# Patient Record
Sex: FEMALE | Race: ASIAN | Employment: OTHER | URBAN - METROPOLITAN AREA
[De-identification: names, ages, dates, MRNs, and addresses within clinical notes are randomized per-mention and may not be internally consistent; named-entity substitution may affect disease eponyms.]

---

## 2017-01-14 ENCOUNTER — APPOINTMENT (OUTPATIENT)
Dept: LAB | Age: 62
End: 2017-01-14
Attending: INTERNAL MEDICINE
Payer: COMMERCIAL

## 2017-01-14 DIAGNOSIS — E11.9 TYPE 2 DIABETES MELLITUS WITHOUT COMPLICATION, WITHOUT LONG-TERM CURRENT USE OF INSULIN (HCC): ICD-10-CM

## 2017-01-14 LAB
ALBUMIN SERPL BCP-MCNC: 3.7 G/DL (ref 3.5–4.8)
ALBUMIN/GLOB SERPL: 1.2 {RATIO} (ref 1–2)
ALP SERPL-CCNC: 54 U/L (ref 32–100)
ALT SERPL-CCNC: 18 U/L (ref 14–54)
ANION GAP SERPL CALC-SCNC: 10 MMOL/L (ref 0–18)
AST SERPL-CCNC: 16 U/L (ref 15–41)
BILIRUB SERPL-MCNC: 0.9 MG/DL (ref 0.3–1.2)
BUN SERPL-MCNC: 8 MG/DL (ref 8–20)
BUN/CREAT SERPL: 12.9 (ref 10–20)
CALCIUM SERPL-MCNC: 8.5 MG/DL (ref 8.5–10.5)
CHLORIDE SERPL-SCNC: 102 MMOL/L (ref 95–110)
CHOLEST SERPL-MCNC: 173 MG/DL (ref 110–200)
CO2 SERPL-SCNC: 26 MMOL/L (ref 22–32)
CREAT SERPL-MCNC: 0.62 MG/DL (ref 0.5–1.5)
CREAT UR-MCNC: 138.1 MG/DL
GLOBULIN PLAS-MCNC: 3.1 G/DL (ref 2.5–3.7)
GLUCOSE SERPL-MCNC: 168 MG/DL (ref 70–99)
HDLC SERPL-MCNC: 71 MG/DL
LDLC SERPL CALC-MCNC: 89 MG/DL (ref 0–99)
MICROALBUMIN UR-MCNC: 9.8 MG/DL (ref 0–1.8)
MICROALBUMIN/CREAT UR: 71 MG/G{CREAT} (ref 0–20)
NONHDLC SERPL-MCNC: 102 MG/DL
OSMOLALITY UR CALC.SUM OF ELEC: 288 MOSM/KG (ref 275–295)
POTASSIUM SERPL-SCNC: 3.6 MMOL/L (ref 3.3–5.1)
PROT SERPL-MCNC: 6.8 G/DL (ref 5.9–8.4)
SODIUM SERPL-SCNC: 138 MMOL/L (ref 136–144)
TRIGL SERPL-MCNC: 67 MG/DL (ref 1–149)
TSH SERPL-ACNC: 1.01 UIU/ML (ref 0.34–5.6)

## 2017-01-14 PROCEDURE — 80053 COMPREHEN METABOLIC PANEL: CPT

## 2017-01-14 PROCEDURE — 82043 UR ALBUMIN QUANTITATIVE: CPT

## 2017-01-14 PROCEDURE — 80061 LIPID PANEL: CPT

## 2017-01-14 PROCEDURE — 84443 ASSAY THYROID STIM HORMONE: CPT

## 2017-01-14 PROCEDURE — 36415 COLL VENOUS BLD VENIPUNCTURE: CPT

## 2017-01-14 PROCEDURE — 82570 ASSAY OF URINE CREATININE: CPT

## 2017-01-14 PROCEDURE — 83036 HEMOGLOBIN GLYCOSYLATED A1C: CPT

## 2017-01-15 LAB — HBA1C MFR BLD: 8.4 % (ref 4–6)

## 2017-01-26 RX ORDER — GLIMEPIRIDE 1 MG/1
TABLET ORAL
Qty: 180 TABLET | Refills: 0 | Status: SHIPPED | OUTPATIENT
Start: 2017-01-26 | End: 2017-02-07

## 2017-01-26 RX ORDER — ROSUVASTATIN CALCIUM 10 MG/1
TABLET, COATED ORAL
Qty: 90 TABLET | Refills: 0 | Status: SHIPPED | OUTPATIENT
Start: 2017-01-26 | End: 2018-01-15

## 2017-01-27 ENCOUNTER — TELEPHONE (OUTPATIENT)
Dept: INTERNAL MEDICINE CLINIC | Facility: CLINIC | Age: 62
End: 2017-01-27

## 2017-01-27 NOTE — TELEPHONE ENCOUNTER
Relayed doctor message to patient. Verbalized understanding. Agreed with plan.  Already has appt scheduled for 2/7

## 2017-01-27 NOTE — TELEPHONE ENCOUNTER
----- Message from Larry Fleming MD sent at 1/16/2017 12:00 AM CST -----  Diabetes is uncontrolled.   Please see me to discuss findings    Send letter and labs  Make an appt within the next month

## 2017-02-07 ENCOUNTER — OFFICE VISIT (OUTPATIENT)
Dept: INTERNAL MEDICINE CLINIC | Facility: CLINIC | Age: 62
End: 2017-02-07

## 2017-02-07 VITALS
SYSTOLIC BLOOD PRESSURE: 115 MMHG | WEIGHT: 147 LBS | BODY MASS INDEX: 30.03 KG/M2 | HEART RATE: 86 BPM | DIASTOLIC BLOOD PRESSURE: 75 MMHG | HEIGHT: 58.5 IN

## 2017-02-07 DIAGNOSIS — E11.69: Primary | ICD-10-CM

## 2017-02-07 DIAGNOSIS — E78.5 HYPERLIPIDEMIA, UNSPECIFIED HYPERLIPIDEMIA TYPE: ICD-10-CM

## 2017-02-07 DIAGNOSIS — I10 ESSENTIAL HYPERTENSION WITH GOAL BLOOD PRESSURE LESS THAN 130/80: ICD-10-CM

## 2017-02-07 DIAGNOSIS — Z78.0 POSTMENOPAUSAL: ICD-10-CM

## 2017-02-07 DIAGNOSIS — Z12.31 VISIT FOR SCREENING MAMMOGRAM: ICD-10-CM

## 2017-02-07 DIAGNOSIS — R80.9 PROTEINURIA, UNSPECIFIED TYPE: ICD-10-CM

## 2017-02-07 PROCEDURE — 99213 OFFICE O/P EST LOW 20 MIN: CPT | Performed by: INTERNAL MEDICINE

## 2017-02-07 PROCEDURE — 99214 OFFICE O/P EST MOD 30 MIN: CPT | Performed by: INTERNAL MEDICINE

## 2017-02-15 RX ORDER — GLIMEPIRIDE 1 MG/1
TABLET ORAL
Qty: 180 TABLET | Refills: 3 | Status: SHIPPED | OUTPATIENT
Start: 2017-02-15 | End: 2018-01-15

## 2017-02-19 RX ORDER — AMLODIPINE BESYLATE 10 MG/1
TABLET ORAL
Qty: 90 TABLET | Refills: 3 | Status: SHIPPED | OUTPATIENT
Start: 2017-02-19 | End: 2018-01-15

## 2017-03-22 RX ORDER — CLOTRIMAZOLE AND BETAMETHASONE DIPROPIONATE 10; .64 MG/G; MG/G
CREAM TOPICAL
Qty: 15 G | Refills: 0 | Status: SHIPPED | OUTPATIENT
Start: 2017-03-22 | End: 2018-01-15

## 2017-03-23 NOTE — TELEPHONE ENCOUNTER
Unable to leave message on voice mail. Letter mailed to pt informing pt of refill, needs a f/u appt for any further refills and asked to call the dermatology office to update her contact information.

## 2017-04-20 ENCOUNTER — PATIENT OUTREACH (OUTPATIENT)
Dept: INTERNAL MEDICINE CLINIC | Facility: CLINIC | Age: 62
End: 2017-04-20

## 2017-04-26 NOTE — PROGRESS NOTES
Spoke with pt. Discussed that she is due for repeat Hb A1c. Pt states \"I just had labs done! \". Results and date reviewed with her. She agrees to have them completed.   Instructed pt that she will need to fast for blood work as Lipid was also ordered at

## 2017-05-01 RX ORDER — FLUTICASONE PROPIONATE 50 MCG
SPRAY, SUSPENSION (ML) NASAL
Qty: 1 BOTTLE | Refills: 3 | OUTPATIENT
Start: 2017-05-01

## 2017-05-13 RX ORDER — MONTELUKAST SODIUM 10 MG/1
TABLET ORAL
Qty: 30 TABLET | Refills: 5 | Status: SHIPPED | OUTPATIENT
Start: 2017-05-13 | End: 2018-01-13

## 2017-05-22 RX ORDER — ROSUVASTATIN CALCIUM 10 MG/1
TABLET, COATED ORAL
Qty: 90 TABLET | Refills: 3 | Status: SHIPPED | OUTPATIENT
Start: 2017-05-22 | End: 2018-10-13

## 2017-06-03 ENCOUNTER — APPOINTMENT (OUTPATIENT)
Dept: LAB | Age: 62
End: 2017-06-03
Attending: INTERNAL MEDICINE
Payer: COMMERCIAL

## 2017-06-03 DIAGNOSIS — E11.69: ICD-10-CM

## 2017-06-03 PROCEDURE — 84450 TRANSFERASE (AST) (SGOT): CPT

## 2017-06-03 PROCEDURE — 80061 LIPID PANEL: CPT

## 2017-06-03 PROCEDURE — 83036 HEMOGLOBIN GLYCOSYLATED A1C: CPT

## 2017-06-03 PROCEDURE — 84460 ALANINE AMINO (ALT) (SGPT): CPT

## 2017-06-03 PROCEDURE — 36415 COLL VENOUS BLD VENIPUNCTURE: CPT

## 2017-06-03 PROCEDURE — 80048 BASIC METABOLIC PNL TOTAL CA: CPT

## 2017-06-07 ENCOUNTER — TELEPHONE (OUTPATIENT)
Dept: INTERNAL MEDICINE CLINIC | Facility: CLINIC | Age: 62
End: 2017-06-07

## 2017-06-07 NOTE — TELEPHONE ENCOUNTER
Pt is requesting an apt for next week in [place].   Pt states she was told if she needed an apt soon, contact MD. Please advise

## 2017-06-14 ENCOUNTER — OFFICE VISIT (OUTPATIENT)
Dept: INTERNAL MEDICINE CLINIC | Facility: CLINIC | Age: 62
End: 2017-06-14

## 2017-06-14 VITALS
WEIGHT: 147 LBS | SYSTOLIC BLOOD PRESSURE: 131 MMHG | TEMPERATURE: 99 F | HEIGHT: 58.5 IN | HEART RATE: 82 BPM | DIASTOLIC BLOOD PRESSURE: 77 MMHG | BODY MASS INDEX: 30.03 KG/M2 | RESPIRATION RATE: 16 BRPM

## 2017-06-14 DIAGNOSIS — E11.29 DIABETES MELLITUS WITH PROTEINURIA (HCC): ICD-10-CM

## 2017-06-14 DIAGNOSIS — I10 ESSENTIAL HYPERTENSION WITH GOAL BLOOD PRESSURE LESS THAN 130/80: ICD-10-CM

## 2017-06-14 DIAGNOSIS — R80.9 DIABETES MELLITUS WITH PROTEINURIA (HCC): ICD-10-CM

## 2017-06-14 DIAGNOSIS — E11.69 TYPE 2 DIABETES MELLITUS WITH OTHER SPECIFIED COMPLICATION, WITHOUT LONG-TERM CURRENT USE OF INSULIN (HCC): Primary | ICD-10-CM

## 2017-06-14 DIAGNOSIS — E78.5 HYPERLIPIDEMIA, UNSPECIFIED HYPERLIPIDEMIA TYPE: ICD-10-CM

## 2017-06-14 PROCEDURE — 99214 OFFICE O/P EST MOD 30 MIN: CPT | Performed by: INTERNAL MEDICINE

## 2017-06-14 PROCEDURE — 99212 OFFICE O/P EST SF 10 MIN: CPT | Performed by: INTERNAL MEDICINE

## 2017-06-14 RX ORDER — FLUTICASONE PROPIONATE 50 MCG
SPRAY, SUSPENSION (ML) NASAL
Qty: 1 BOTTLE | Refills: 11 | Status: SHIPPED | OUTPATIENT
Start: 2017-06-14 | End: 2018-07-11

## 2017-06-17 NOTE — PROGRESS NOTES
Quick Note:    Per EMR pt lov 6-14-17 for f/u on labs. Any further action needed or Ok to file?    pls advise, thanks in advance.     ______

## 2017-06-24 PROBLEM — R80.9 DIABETES MELLITUS WITH PROTEINURIA: Status: ACTIVE | Noted: 2017-06-24

## 2017-06-24 PROBLEM — E78.5 HYPERLIPIDEMIA: Status: ACTIVE | Noted: 2017-06-24

## 2017-06-24 PROBLEM — E11.9 TYPE 2 DIABETES MELLITUS, WITHOUT LONG-TERM CURRENT USE OF INSULIN (HCC): Status: ACTIVE | Noted: 2017-06-24

## 2017-06-24 PROBLEM — I10 ESSENTIAL HYPERTENSION WITH GOAL BLOOD PRESSURE LESS THAN 130/80: Status: ACTIVE | Noted: 2017-06-24

## 2017-06-24 PROBLEM — R80.9 DIABETES MELLITUS WITH PROTEINURIA (HCC): Status: ACTIVE | Noted: 2017-06-24

## 2017-06-24 PROBLEM — R80.9 DIABETES MELLITUS WITH PROTEINURIA  (HCC): Status: ACTIVE | Noted: 2017-06-24

## 2017-06-24 PROBLEM — E11.29 DIABETES MELLITUS WITH PROTEINURIA: Status: ACTIVE | Noted: 2017-06-24

## 2017-06-24 PROBLEM — E11.29 DIABETES MELLITUS WITH PROTEINURIA  (HCC): Status: ACTIVE | Noted: 2017-06-24

## 2017-06-24 PROBLEM — E11.29 DIABETES MELLITUS WITH PROTEINURIA (HCC): Status: ACTIVE | Noted: 2017-06-24

## 2017-06-25 NOTE — PROGRESS NOTES
HPI:    Patient ID: Tatyana Del Cid is a 57year old female.     HPI    HTN  Long standing history of hypertension  More than a year shaun  Status::::: controlled:::::::::::::::::::::::::::::y   sympotms  :        Headache no  dizziness        no problems.            Current Outpatient Prescriptions:  Fluticasone Propionate 50 MCG/ACT Nasal Suspension USE 1 SPRAY IN EACH NOSTRIL TWICE DAILY Disp: 1 Bottle Rfl: 11   MetFORMIN HCl 500 MG Oral Tab TAKE 2 TABLETS BY MOUTH TWICE DAILY Disp: 360 tablet Rf Mother      Cause of Death   • Pulmonary Disease Father 68     COPD Cause of Death   • Heart Disorder Brother 46     Congestive Heart Failure      Social History: Smoking status: Never Smoker                                                              Alc - 18 mmol/L 9   CALCULATED OSMOLALITY      275 - 295 mOsm/kg 293   GFR, Non-      >=60 >60   GFR, -American      >=60 >60   HDL Cholesterol      mg/dL 73   CHOLESTEROL, TOTAL      110 - 200 mg/dL 180   Triglycerides      1 - 149 mg/d prevention of renal failure adivsed    (I10) Essential hypertension with goal blood pressure less than 130/80  Plan: /77   Pulse 82   Temp 98.8 °F (37.1 °C) (Oral)   Resp 16   Ht 4' 10.5\" (1.486 m)   Wt 147 lb (66.7 kg)   BMI 30.20 kg/m²   Controlle

## 2017-08-18 ENCOUNTER — HOSPITAL ENCOUNTER (OUTPATIENT)
Dept: MAMMOGRAPHY | Age: 62
Discharge: HOME | End: 2017-08-18
Attending: INTERNAL MEDICINE
Payer: COMMERCIAL

## 2017-08-18 ENCOUNTER — HOSPITAL ENCOUNTER (OUTPATIENT)
Dept: BONE DENSITY | Age: 62
Discharge: HOME | End: 2017-08-18
Attending: INTERNAL MEDICINE
Payer: COMMERCIAL

## 2017-08-18 DIAGNOSIS — Z12.31 VISIT FOR SCREENING MAMMOGRAM: ICD-10-CM

## 2017-08-18 DIAGNOSIS — Z78.0 POSTMENOPAUSAL: ICD-10-CM

## 2017-08-18 PROCEDURE — 77080 DXA BONE DENSITY AXIAL: CPT | Performed by: INTERNAL MEDICINE

## 2017-08-18 PROCEDURE — 77067 SCR MAMMO BI INCL CAD: CPT | Performed by: INTERNAL MEDICINE

## 2017-08-29 RX ORDER — AMLODIPINE BESYLATE 10 MG/1
TABLET ORAL
Qty: 90 TABLET | Refills: 3 | Status: SHIPPED | OUTPATIENT
Start: 2017-08-29 | End: 2019-04-10

## 2017-09-06 RX ORDER — MONTELUKAST SODIUM 10 MG/1
TABLET ORAL
Qty: 30 TABLET | Refills: 0 | OUTPATIENT
Start: 2017-09-06

## 2018-01-13 RX ORDER — MONTELUKAST SODIUM 10 MG/1
TABLET ORAL
Qty: 30 TABLET | Refills: 0 | Status: SHIPPED | OUTPATIENT
Start: 2018-01-13 | End: 2018-05-23

## 2018-01-13 NOTE — TELEPHONE ENCOUNTER
Refill Protocol Appointment Criteria  · Appointment scheduled in the past 6 months or in the next 3 months  Recent Outpatient Visits            7 months ago Type 2 diabetes mellitus with other specified complication, without long-term current use of insuli

## 2018-01-15 ENCOUNTER — OFFICE VISIT (OUTPATIENT)
Dept: INTERNAL MEDICINE CLINIC | Facility: CLINIC | Age: 63
End: 2018-01-15

## 2018-01-15 VITALS
TEMPERATURE: 98 F | HEIGHT: 58.5 IN | DIASTOLIC BLOOD PRESSURE: 67 MMHG | HEART RATE: 76 BPM | WEIGHT: 140.63 LBS | SYSTOLIC BLOOD PRESSURE: 113 MMHG | BODY MASS INDEX: 28.73 KG/M2

## 2018-01-15 DIAGNOSIS — I10 ESSENTIAL HYPERTENSION WITH GOAL BLOOD PRESSURE LESS THAN 130/80: ICD-10-CM

## 2018-01-15 DIAGNOSIS — E11.69 TYPE 2 DIABETES MELLITUS WITH OTHER SPECIFIED COMPLICATION, WITHOUT LONG-TERM CURRENT USE OF INSULIN (HCC): Primary | ICD-10-CM

## 2018-01-15 DIAGNOSIS — E78.5 HYPERLIPIDEMIA, UNSPECIFIED HYPERLIPIDEMIA TYPE: ICD-10-CM

## 2018-01-15 PROCEDURE — 99214 OFFICE O/P EST MOD 30 MIN: CPT | Performed by: INTERNAL MEDICINE

## 2018-01-15 PROCEDURE — 90471 IMMUNIZATION ADMIN: CPT | Performed by: INTERNAL MEDICINE

## 2018-01-15 PROCEDURE — 99212 OFFICE O/P EST SF 10 MIN: CPT | Performed by: INTERNAL MEDICINE

## 2018-01-15 PROCEDURE — 90686 IIV4 VACC NO PRSV 0.5 ML IM: CPT | Performed by: INTERNAL MEDICINE

## 2018-01-15 RX ORDER — GLIMEPIRIDE 1 MG/1
1 TABLET ORAL 2 TIMES DAILY
Qty: 180 TABLET | Refills: 3 | Status: SHIPPED | OUTPATIENT
Start: 2018-01-15 | End: 2018-10-15

## 2018-01-23 NOTE — PROGRESS NOTES
HPI:    Patient ID: Mandy Barrientos is a 60year old female. HPI     Diabetes   Follow upvisit. type 2 diabetes mellitus.     disease course has been    Controlled   There are no hypoglycemic associated symptoms   blurred vision,   no  no chest pain, Neurological: Negative for syncope, weakness, light-headedness and headaches. Hematological: Negative for adenopathy. Does not bruise/bleed easily. Psychiatric/Behavioral: Negative for agitation and behavioral problems.            Current Outpatient P glasses yearly       PHYSICAL EXAM:    Physical Exam   Constitutional: She appears well-nourished. No distress. HENT:   Head: Normocephalic and atraumatic.    Right Ear: External ear normal.   Left Ear: External ear normal.   Nose: Nose normal.   Mouth/Th side effect reviewed. Most recent laboratory and diagnostic testing reviewed. Dietary and lifestyle change discussed. Modification of risk for CAD discussed. Patient voiced understanding and agrees with current plan and management.           Orders Plac

## 2018-02-20 RX ORDER — AMLODIPINE BESYLATE 10 MG/1
TABLET ORAL
Qty: 90 TABLET | Refills: 0 | Status: SHIPPED | OUTPATIENT
Start: 2018-02-20 | End: 2018-09-06

## 2018-02-20 RX ORDER — MONTELUKAST SODIUM 10 MG/1
TABLET ORAL
Qty: 90 TABLET | Refills: 0 | Status: SHIPPED | OUTPATIENT
Start: 2018-02-20 | End: 2018-05-23

## 2018-03-19 RX ORDER — CLOTRIMAZOLE AND BETAMETHASONE DIPROPIONATE 10; .64 MG/G; MG/G
CREAM TOPICAL
Qty: 15 G | Refills: 0 | OUTPATIENT
Start: 2018-03-19

## 2018-03-19 NOTE — TELEPHONE ENCOUNTER
LOV 12/30/15 pt requesting refill for CLOTRIMAZOLE-BETAMETHASONE 1-0.05 % External Cream please advise thank you

## 2018-05-23 RX ORDER — MONTELUKAST SODIUM 10 MG/1
TABLET ORAL
Qty: 90 TABLET | Refills: 0 | Status: SHIPPED | OUTPATIENT
Start: 2018-05-23 | End: 2018-10-02

## 2018-05-25 RX ORDER — CLOTRIMAZOLE AND BETAMETHASONE DIPROPIONATE 10; .64 MG/G; MG/G
1 CREAM TOPICAL 2 TIMES DAILY
Qty: 15 G | Refills: 3 | Status: SHIPPED | OUTPATIENT
Start: 2018-05-25 | End: 2020-03-16

## 2018-06-23 ENCOUNTER — APPOINTMENT (OUTPATIENT)
Dept: LAB | Age: 63
End: 2018-06-23
Attending: INTERNAL MEDICINE
Payer: COMMERCIAL

## 2018-07-11 ENCOUNTER — OFFICE VISIT (OUTPATIENT)
Dept: INTERNAL MEDICINE CLINIC | Facility: CLINIC | Age: 63
End: 2018-07-11

## 2018-07-11 VITALS
DIASTOLIC BLOOD PRESSURE: 74 MMHG | SYSTOLIC BLOOD PRESSURE: 134 MMHG | BODY MASS INDEX: 30.24 KG/M2 | HEIGHT: 58.5 IN | HEART RATE: 94 BPM | TEMPERATURE: 98 F | WEIGHT: 148 LBS

## 2018-07-11 DIAGNOSIS — E78.5 HYPERLIPIDEMIA, UNSPECIFIED HYPERLIPIDEMIA TYPE: ICD-10-CM

## 2018-07-11 DIAGNOSIS — R80.9 DIABETES MELLITUS WITH PROTEINURIA (HCC): ICD-10-CM

## 2018-07-11 DIAGNOSIS — I10 ESSENTIAL HYPERTENSION WITH GOAL BLOOD PRESSURE LESS THAN 130/80: ICD-10-CM

## 2018-07-11 DIAGNOSIS — E11.29 DIABETES MELLITUS WITH PROTEINURIA (HCC): ICD-10-CM

## 2018-07-11 DIAGNOSIS — Z12.31 VISIT FOR SCREENING MAMMOGRAM: ICD-10-CM

## 2018-07-11 DIAGNOSIS — E11.69 TYPE 2 DIABETES MELLITUS WITH OTHER SPECIFIED COMPLICATION, WITHOUT LONG-TERM CURRENT USE OF INSULIN (HCC): Primary | ICD-10-CM

## 2018-07-11 PROCEDURE — 99214 OFFICE O/P EST MOD 30 MIN: CPT | Performed by: INTERNAL MEDICINE

## 2018-07-11 PROCEDURE — 99212 OFFICE O/P EST SF 10 MIN: CPT | Performed by: INTERNAL MEDICINE

## 2018-07-23 NOTE — PROGRESS NOTES
HPI:    Patient ID: Yadi Eason is a 60year old female.     HPI    HTN  Long standing history of hypertension     sympotms  :        Headache no  dizziness        no                             Blurred vision no  palpitaionsSyncope no  Chest pain  no Current Outpatient Prescriptions:  NON FORMULARY  Disp:  Rfl:    clotrimazole-betamethasone 1-0.05 % External Cream Apply 1 Tube topically 2 (two) times daily.  Disp: 15 g Rfl: 3   Montelukast Sodium 10 MG Oral Tab TAKE 1 TABLET BY MOUTH EVERY CRISTINA distress. HENT:   Head: Normocephalic and atraumatic. Right Ear: External ear normal.   Left Ear: External ear normal.   Nose: Nose normal.   Mouth/Throat: Oropharynx is clear and moist. No oropharyngeal exudate.    Eyes: Conjunctivae and EOM are normal 10.5\" (1.486 m)   Wt 148 lb (67.1 kg)   BMI 30.41 kg/m²   controlle dCPM    (E78.5) Hyperlipidemia, unspecified hyperlipidemia type  Plan: FREE T4 (FREE THYROXINE), ASSAY, THYROID STIM         HORMONE, LIPID PANEL        Low chol diet    (Z12.31) Visit fo

## 2018-08-15 ENCOUNTER — OFFICE VISIT (OUTPATIENT)
Dept: PODIATRY CLINIC | Facility: CLINIC | Age: 63
End: 2018-08-15
Payer: COMMERCIAL

## 2018-08-15 DIAGNOSIS — Q82.8 POROKERATOSIS: ICD-10-CM

## 2018-08-15 DIAGNOSIS — M77.42 METATARSALGIA OF LEFT FOOT: ICD-10-CM

## 2018-08-15 DIAGNOSIS — M21.622 TAILOR'S BUNION OF LEFT FOOT: Primary | ICD-10-CM

## 2018-08-15 PROCEDURE — 99203 OFFICE O/P NEW LOW 30 MIN: CPT | Performed by: PODIATRIST

## 2018-08-16 NOTE — PROGRESS NOTES
Tatyana eDl Cid is a 60year old female. Patient presents with:  Consult: bilateral foot pain -- left foot pain is worse. Rates pain 8/10. BG is not checked. Onset 2-3 mths. Denies injuries. Pt states pain may be r/t a callus. Saw Dr. Evangelina Magana on 07/11/18. Hyperlipidemia      Past Surgical History:  No date: COLONOSCOPY & POLYPECTOMY  06/05/2014: ELECTROCARDIOGRAM, COMPLETE      Comment: Scanned to Media Tab   Family History   Problem Relation Age of Onset   • Uterine Cancer Mother      Cause of Death   • Pu Musculoskeletal: The patient has a tailor's bunion deformity bilateral.    ASSESSMENT AND PLAN:   Diagnoses and all orders for this visit:    Tailor's bunion of left foot  -     XR FOOT WEIGHTBEARING (2 VIEWS), LEFT   (CPT=73620);  Future    Porokeratosis

## 2018-08-25 ENCOUNTER — HOSPITAL ENCOUNTER (OUTPATIENT)
Dept: MAMMOGRAPHY | Age: 63
Discharge: HOME | End: 2018-08-25
Attending: INTERNAL MEDICINE
Payer: COMMERCIAL

## 2018-08-25 DIAGNOSIS — Z12.31 VISIT FOR SCREENING MAMMOGRAM: ICD-10-CM

## 2018-08-25 PROCEDURE — 77067 SCR MAMMO BI INCL CAD: CPT | Performed by: INTERNAL MEDICINE

## 2018-09-06 RX ORDER — FLUTICASONE PROPIONATE 50 MCG
SPRAY, SUSPENSION (ML) NASAL
Qty: 1 BOTTLE | Refills: 3 | Status: SHIPPED | OUTPATIENT
Start: 2018-09-06 | End: 2019-02-02

## 2018-09-08 ENCOUNTER — LAB ENCOUNTER (OUTPATIENT)
Dept: LAB | Age: 63
End: 2018-09-08
Attending: INTERNAL MEDICINE
Payer: COMMERCIAL

## 2018-09-08 DIAGNOSIS — E11.69 TYPE 2 DIABETES MELLITUS WITH OTHER SPECIFIED COMPLICATION, WITHOUT LONG-TERM CURRENT USE OF INSULIN (HCC): ICD-10-CM

## 2018-09-08 DIAGNOSIS — E78.5 HYPERLIPIDEMIA, UNSPECIFIED HYPERLIPIDEMIA TYPE: ICD-10-CM

## 2018-09-08 LAB
ALBUMIN SERPL BCP-MCNC: 3.9 G/DL (ref 3.5–4.8)
ALBUMIN/GLOB SERPL: 1.3 {RATIO} (ref 1–2)
ALP SERPL-CCNC: 51 U/L (ref 32–100)
ALT SERPL-CCNC: 17 U/L (ref 14–54)
ANION GAP SERPL CALC-SCNC: 9 MMOL/L (ref 0–18)
AST SERPL-CCNC: 18 U/L (ref 15–41)
BILIRUB SERPL-MCNC: 0.6 MG/DL (ref 0.3–1.2)
BUN SERPL-MCNC: 9 MG/DL (ref 8–20)
BUN/CREAT SERPL: 13.6 (ref 10–20)
CALCIUM SERPL-MCNC: 8.8 MG/DL (ref 8.5–10.5)
CHLORIDE SERPL-SCNC: 104 MMOL/L (ref 95–110)
CHOLEST SERPL-MCNC: 152 MG/DL (ref 110–200)
CO2 SERPL-SCNC: 25 MMOL/L (ref 22–32)
CREAT SERPL-MCNC: 0.66 MG/DL (ref 0.5–1.5)
ERYTHROCYTE [DISTWIDTH] IN BLOOD BY AUTOMATED COUNT: 12.4 % (ref 11–15)
GLOBULIN PLAS-MCNC: 3.1 G/DL (ref 2.5–3.7)
GLUCOSE SERPL-MCNC: 159 MG/DL (ref 70–99)
HBA1C MFR BLD: 8.4 % (ref 4–6)
HCT VFR BLD AUTO: 41.2 % (ref 35–48)
HDLC SERPL-MCNC: 71 MG/DL
HGB BLD-MCNC: 13.5 G/DL (ref 12–16)
LDLC SERPL CALC-MCNC: 62 MG/DL (ref 0–99)
MCH RBC QN AUTO: 30.2 PG (ref 27–32)
MCHC RBC AUTO-ENTMCNC: 32.7 G/DL (ref 32–37)
MCV RBC AUTO: 92.3 FL (ref 80–100)
NONHDLC SERPL-MCNC: 81 MG/DL
OSMOLALITY UR CALC.SUM OF ELEC: 288 MOSM/KG (ref 275–295)
PATIENT FASTING: YES
PLATELET # BLD AUTO: 225 K/UL (ref 140–400)
PMV BLD AUTO: 8.2 FL (ref 7.4–10.3)
POTASSIUM SERPL-SCNC: 3.9 MMOL/L (ref 3.3–5.1)
PROT SERPL-MCNC: 7 G/DL (ref 5.9–8.4)
PROT UR-MCNC: 9 MG/DL
RBC # BLD AUTO: 4.47 M/UL (ref 3.7–5.4)
RBC #/AREA URNS AUTO: 1 /HPF
SODIUM SERPL-SCNC: 138 MMOL/L (ref 136–144)
T4 FREE SERPL-MCNC: 1.18 NG/DL (ref 0.58–1.64)
TRIGL SERPL-MCNC: 95 MG/DL (ref 1–149)
TSH SERPL-ACNC: 1.39 UIU/ML (ref 0.45–5.33)
WBC # BLD AUTO: 4.7 K/UL (ref 4–11)
WBC #/AREA URNS AUTO: 37 /HPF

## 2018-09-08 PROCEDURE — 85027 COMPLETE CBC AUTOMATED: CPT

## 2018-09-08 PROCEDURE — 87077 CULTURE AEROBIC IDENTIFY: CPT

## 2018-09-08 PROCEDURE — 84156 ASSAY OF PROTEIN URINE: CPT

## 2018-09-08 PROCEDURE — 84439 ASSAY OF FREE THYROXINE: CPT

## 2018-09-08 PROCEDURE — 84443 ASSAY THYROID STIM HORMONE: CPT

## 2018-09-08 PROCEDURE — 80061 LIPID PANEL: CPT

## 2018-09-08 PROCEDURE — 81015 MICROSCOPIC EXAM OF URINE: CPT

## 2018-09-08 PROCEDURE — 36415 COLL VENOUS BLD VENIPUNCTURE: CPT

## 2018-09-08 PROCEDURE — 80053 COMPREHEN METABOLIC PANEL: CPT

## 2018-09-08 PROCEDURE — 83036 HEMOGLOBIN GLYCOSYLATED A1C: CPT

## 2018-09-08 PROCEDURE — 87086 URINE CULTURE/COLONY COUNT: CPT

## 2018-09-08 PROCEDURE — 87186 SC STD MICRODIL/AGAR DIL: CPT

## 2018-09-09 RX ORDER — AMLODIPINE BESYLATE 10 MG/1
TABLET ORAL
Qty: 90 TABLET | Refills: 3 | Status: SHIPPED | OUTPATIENT
Start: 2018-09-09 | End: 2019-09-26

## 2018-09-10 ENCOUNTER — TELEPHONE (OUTPATIENT)
Dept: INTERNAL MEDICINE CLINIC | Facility: CLINIC | Age: 63
End: 2018-09-10

## 2018-09-10 NOTE — TELEPHONE ENCOUNTER
Dr Héctor Shin, on-call, for Dr. Nichole Michelle needs to go to the correct pharmacy, there is an interaction warning. Advised patient on Dr. Heather Walters recommendation. Patient verbalized understanding. She verified the correct pharmacy.     Called [facility]

## 2018-09-10 NOTE — TELEPHONE ENCOUNTER
cipro 500 mg po bid x 10 days sent to pharmacy for     RX for UTI  Escherichia coli       Not Specified     Ampicillin >=32  Resistant     Ampicillin + Sulbactam 4  Sensitive     Cefazolin <=4  Sensitive     Ciprofloxacin <=0.25  Sensitive     Genta

## 2018-09-12 RX ORDER — CIPROFLOXACIN 500 MG/1
500 TABLET, FILM COATED ORAL 2 TIMES DAILY
Qty: 20 TABLET | Refills: 0 | Status: SHIPPED | OUTPATIENT
Start: 2018-09-12 | End: 2018-09-22

## 2018-10-02 RX ORDER — MONTELUKAST SODIUM 10 MG/1
TABLET ORAL
Qty: 90 TABLET | Refills: 3 | Status: SHIPPED | OUTPATIENT
Start: 2018-10-02 | End: 2019-10-11

## 2018-10-09 NOTE — PROGRESS NOTES
Spoke with patient and advised Dr Eloina Covington notes, with OV on 10/15/18. Notes recorded by Burke Hutchison MD on 10/9/2018 at 5:58 AM CDT  Send letter and copy of test result.   UTI treated appropirately with cipro  See me for follow up  Management of

## 2018-10-15 ENCOUNTER — OFFICE VISIT (OUTPATIENT)
Dept: INTERNAL MEDICINE CLINIC | Facility: CLINIC | Age: 63
End: 2018-10-15
Payer: COMMERCIAL

## 2018-10-15 VITALS
DIASTOLIC BLOOD PRESSURE: 70 MMHG | BODY MASS INDEX: 30.24 KG/M2 | HEART RATE: 87 BPM | WEIGHT: 148 LBS | SYSTOLIC BLOOD PRESSURE: 126 MMHG | HEIGHT: 58.5 IN

## 2018-10-15 DIAGNOSIS — E11.69 TYPE 2 DIABETES MELLITUS WITH OTHER SPECIFIED COMPLICATION, WITHOUT LONG-TERM CURRENT USE OF INSULIN (HCC): Primary | ICD-10-CM

## 2018-10-15 DIAGNOSIS — E78.5 HYPERLIPIDEMIA, UNSPECIFIED HYPERLIPIDEMIA TYPE: ICD-10-CM

## 2018-10-15 DIAGNOSIS — I10 ESSENTIAL HYPERTENSION WITH GOAL BLOOD PRESSURE LESS THAN 130/80: ICD-10-CM

## 2018-10-15 PROCEDURE — 99212 OFFICE O/P EST SF 10 MIN: CPT | Performed by: INTERNAL MEDICINE

## 2018-10-15 PROCEDURE — 99214 OFFICE O/P EST MOD 30 MIN: CPT | Performed by: INTERNAL MEDICINE

## 2018-10-15 RX ORDER — GLIMEPIRIDE 1 MG/1
1 TABLET ORAL 2 TIMES DAILY
Qty: 180 TABLET | Refills: 3 | Status: SHIPPED | OUTPATIENT
Start: 2018-10-15 | End: 2019-04-10

## 2018-10-15 RX ORDER — ROSUVASTATIN CALCIUM 10 MG/1
TABLET, COATED ORAL
Qty: 90 TABLET | Refills: 3 | Status: SHIPPED | OUTPATIENT
Start: 2018-10-15 | End: 2019-10-19

## 2018-10-15 NOTE — PATIENT INSTRUCTIONS
Component      Latest Ref Rng & Units 9/8/2018   MCHC      32.0 - 37.0 g/dl 32.7   RDW      11.0 - 15.0 % 12.4   Platelet Count      216 - 400 K/   MEAN PLATELET VOLUME      7.4 - 10.3 fL 8.2   HDL Cholesterol      mg/dL 71   CHOLESTEROL, TOTAL

## 2018-10-24 NOTE — PROGRESS NOTES
HPI:    Patient ID: Natividad Escalona is a 58year old female. HPI    Diabetes   Follow upvisit. type 2 diabetes mellitus.     disease course has been    Controlled   There are no hypoglycemic associated symptoms   blurred vision,   no  no chest pain, light-headedness and headaches. Hematological: Negative for adenopathy. Does not bruise/bleed easily. Psychiatric/Behavioral: Negative for agitation and behavioral problems.            Current Outpatient Medications:  ROSUVASTATIN CALCIUM 10 MG Oral Tab Heart Disorder Brother 46        Congestive Heart Failure      Social History: Social History    Tobacco Use      Smoking status: Never Smoker      Smokeless tobacco: Never Used    Alcohol use:  Yes      Alcohol/week: 0.0 oz      Comment: 1-2 glasses yearly (BP Location: Right arm, Patient Position: Sitting, Cuff Size: adult)   Pulse 87   Ht 4' 10.5\" (1.486 m)   Wt 148 lb (67.1 kg)   BMI 30.41 kg/m²   Controlled CPCM    (E78.5) Hyperlipidemia, unspecified hyperlipidemia type  Plan: low hcol diet advsed     B

## 2019-02-02 RX ORDER — FLUTICASONE PROPIONATE 50 MCG
SPRAY, SUSPENSION (ML) NASAL
Qty: 1 INHALER | Refills: 3 | Status: SHIPPED | OUTPATIENT
Start: 2019-02-02 | End: 2019-05-08

## 2019-03-13 ENCOUNTER — TELEPHONE (OUTPATIENT)
Dept: INTERNAL MEDICINE CLINIC | Facility: CLINIC | Age: 64
End: 2019-03-13

## 2019-03-16 ENCOUNTER — APPOINTMENT (OUTPATIENT)
Dept: LAB | Age: 64
End: 2019-03-16
Attending: INTERNAL MEDICINE
Payer: COMMERCIAL

## 2019-03-16 DIAGNOSIS — E11.69 TYPE 2 DIABETES MELLITUS WITH OTHER SPECIFIED COMPLICATION, WITHOUT LONG-TERM CURRENT USE OF INSULIN (HCC): ICD-10-CM

## 2019-03-16 LAB
ALT SERPL-CCNC: 29 U/L (ref 13–56)
ANION GAP SERPL CALC-SCNC: 5 MMOL/L (ref 0–18)
AST SERPL-CCNC: 21 U/L (ref 15–37)
BUN BLD-MCNC: 11 MG/DL (ref 7–18)
BUN/CREAT SERPL: 16.2 (ref 10–20)
CALCIUM BLD-MCNC: 8.7 MG/DL (ref 8.5–10.1)
CHLORIDE SERPL-SCNC: 105 MMOL/L (ref 98–107)
CHOLEST SMN-MCNC: 168 MG/DL (ref ?–200)
CO2 SERPL-SCNC: 27 MMOL/L (ref 21–32)
CREAT BLD-MCNC: 0.68 MG/DL (ref 0.55–1.02)
EST. AVERAGE GLUCOSE BLD GHB EST-MCNC: 180 MG/DL (ref 68–126)
GLUCOSE BLD-MCNC: 152 MG/DL (ref 70–99)
HBA1C MFR BLD HPLC: 7.9 % (ref ?–5.7)
HDLC SERPL-MCNC: 78 MG/DL (ref 40–59)
LDLC SERPL CALC-MCNC: 71 MG/DL (ref ?–100)
NONHDLC SERPL-MCNC: 90 MG/DL (ref ?–130)
OSMOLALITY SERPL CALC.SUM OF ELEC: 286 MOSM/KG (ref 275–295)
POTASSIUM SERPL-SCNC: 5 MMOL/L (ref 3.5–5.1)
SODIUM SERPL-SCNC: 137 MMOL/L (ref 136–145)
TRIGL SERPL-MCNC: 94 MG/DL (ref 30–149)
VLDLC SERPL CALC-MCNC: 19 MG/DL (ref 0–30)

## 2019-03-16 PROCEDURE — 84450 TRANSFERASE (AST) (SGOT): CPT

## 2019-03-16 PROCEDURE — 83036 HEMOGLOBIN GLYCOSYLATED A1C: CPT

## 2019-03-16 PROCEDURE — 36415 COLL VENOUS BLD VENIPUNCTURE: CPT

## 2019-03-16 PROCEDURE — 80061 LIPID PANEL: CPT

## 2019-03-16 PROCEDURE — 80048 BASIC METABOLIC PNL TOTAL CA: CPT

## 2019-03-16 PROCEDURE — 84460 ALANINE AMINO (ALT) (SGPT): CPT

## 2019-04-10 ENCOUNTER — OFFICE VISIT (OUTPATIENT)
Dept: INTERNAL MEDICINE CLINIC | Facility: CLINIC | Age: 64
End: 2019-04-10
Payer: COMMERCIAL

## 2019-04-10 VITALS
SYSTOLIC BLOOD PRESSURE: 119 MMHG | WEIGHT: 144 LBS | TEMPERATURE: 98 F | DIASTOLIC BLOOD PRESSURE: 70 MMHG | HEIGHT: 58.5 IN | HEART RATE: 94 BPM | BODY MASS INDEX: 29.42 KG/M2

## 2019-04-10 DIAGNOSIS — I10 ESSENTIAL HYPERTENSION WITH GOAL BLOOD PRESSURE LESS THAN 130/80: ICD-10-CM

## 2019-04-10 DIAGNOSIS — E78.5 HYPERLIPIDEMIA, UNSPECIFIED HYPERLIPIDEMIA TYPE: ICD-10-CM

## 2019-04-10 DIAGNOSIS — E11.69 TYPE 2 DIABETES MELLITUS WITH OTHER SPECIFIED COMPLICATION, WITHOUT LONG-TERM CURRENT USE OF INSULIN (HCC): Primary | ICD-10-CM

## 2019-04-10 PROCEDURE — 90471 IMMUNIZATION ADMIN: CPT | Performed by: INTERNAL MEDICINE

## 2019-04-10 PROCEDURE — 90732 PPSV23 VACC 2 YRS+ SUBQ/IM: CPT | Performed by: INTERNAL MEDICINE

## 2019-04-10 PROCEDURE — 99214 OFFICE O/P EST MOD 30 MIN: CPT | Performed by: INTERNAL MEDICINE

## 2019-04-10 PROCEDURE — G0463 HOSPITAL OUTPT CLINIC VISIT: HCPCS | Performed by: INTERNAL MEDICINE

## 2019-04-10 RX ORDER — GLIMEPIRIDE 2 MG/1
2 TABLET ORAL
Qty: 90 TABLET | Refills: 3 | Status: SHIPPED | OUTPATIENT
Start: 2019-04-10 | End: 2020-03-27

## 2019-04-15 NOTE — PROGRESS NOTES
HPI:    Patient ID: Mandy Barrientos is a 58year old female.     HPI    HTN  Long standing history of hypertension     sympotms  :        Headache no  dizziness        no                             Blurred vision no  palpitaionsSyncope no  Chest pain  no agitation and behavioral problems.            Current Outpatient Medications:  glimepiride 2 MG Oral Tab Take 1 tablet (2 mg total) by mouth every morning before breakfast. Disp: 90 tablet Rfl: 3   FLUTICASONE PROPIONATE 50 MCG/ACT Nasal Suspension SHAKE LI oz      Comment: 1-2 glasses yearly    Drug use: No       PHYSICAL EXAM:    Physical Exam   Constitutional: She appears well-nourished. No distress. HENT:   Head: Normocephalic and atraumatic.    Right Ear: External ear normal.   Left Ear: External ear no 10.5\" (1.486 m)   Wt 144 lb (65.3 kg)   BMI 29.58 kg/m²     GERD  GERD precaution  Refill meds      Patient voiced understanding  and agrees with plan  Medications and most recent test results reviewed  Refill medicaitons  as needed  Potential side effect

## 2019-05-09 RX ORDER — FLUTICASONE PROPIONATE 50 MCG
SPRAY, SUSPENSION (ML) NASAL
Qty: 1 BOTTLE | Refills: 1 | Status: SHIPPED | OUTPATIENT
Start: 2019-05-09 | End: 2019-09-17

## 2019-05-09 NOTE — TELEPHONE ENCOUNTER
Refill passed per [facility] protocol.     Refill Protocol Appointment Criteria  · Appointment scheduled in the past 6 months or in the next 3 months  Recent Outpatient Visits            4 weeks ago Type 2 diabetes mellitus with other specified complic

## 2019-05-09 NOTE — TELEPHONE ENCOUNTER
Refill passed per [facility] protocol.     Refill Protocol Appointment Criteria  · Appointment scheduled in the past 12 months or in the next 3 months  Recent Outpatient Visits            4 weeks ago Type 2 diabetes mellitus with other specified compli

## 2019-07-24 ENCOUNTER — TELEPHONE (OUTPATIENT)
Dept: INTERNAL MEDICINE CLINIC | Facility: CLINIC | Age: 64
End: 2019-07-24

## 2019-07-24 DIAGNOSIS — L84 CALLUS OF FOOT: Primary | ICD-10-CM

## 2019-07-24 NOTE — TELEPHONE ENCOUNTER
Patient requesting a referral for a Podiatrist as she has a painful callus on left foot. Please advise.

## 2019-07-25 NOTE — TELEPHONE ENCOUNTER
An order should have been generated with the original message from Triage for the required signature.

## 2019-08-01 ENCOUNTER — TELEPHONE (OUTPATIENT)
Dept: INTERNAL MEDICINE CLINIC | Facility: CLINIC | Age: 64
End: 2019-08-01

## 2019-08-01 NOTE — TELEPHONE ENCOUNTER
Received call from [facility] with [facility] Scheduling, patient called today to schedule her annual mammogram test, no current order, please advise on new order. Last mammogram done 8/25/18, recommended repeat in 1 yr.

## 2019-08-07 ENCOUNTER — OFFICE VISIT (OUTPATIENT)
Dept: PODIATRY CLINIC | Facility: CLINIC | Age: 64
End: 2019-08-07
Payer: COMMERCIAL

## 2019-08-07 DIAGNOSIS — Q82.8 POROKERATOSIS: ICD-10-CM

## 2019-08-07 DIAGNOSIS — M77.42 METATARSALGIA OF BOTH FEET: Primary | ICD-10-CM

## 2019-08-07 DIAGNOSIS — M21.622 TAILOR'S BUNION OF LEFT FOOT: ICD-10-CM

## 2019-08-07 DIAGNOSIS — M77.41 METATARSALGIA OF BOTH FEET: Primary | ICD-10-CM

## 2019-08-07 PROCEDURE — 99213 OFFICE O/P EST LOW 20 MIN: CPT | Performed by: PODIATRIST

## 2019-08-11 NOTE — PROGRESS NOTES
Flakito Nicole is a 58year old female.  Patient presents with:  Callus: Bilateral foot - onset about 2 mo ago when they became painful to walk - rates pain as 7/10 with walking         HPI:   She returns to the clinic again complaining of painful calluses HYSTERECTOMY        Family History   Problem Relation Age of Onset   • Uterine Cancer Mother         Cause of Death   • Pulmonary Disease Father 68        COPD Cause of Death   • Heart Disorder Brother 46        Congestive Heart Failure      Social History Diagnoses and all orders for this visit:    Metatarsalgia of both feet    Tailor's bunion of left foot    Porokeratosis        Plan: Today using a #15 blade trimmed and debrided the hyperkeratoses as far down to healthy tissue as possible.   Advised tru

## 2019-08-24 ENCOUNTER — HOSPITAL ENCOUNTER (OUTPATIENT)
Dept: MAMMOGRAPHY | Age: 64
Discharge: HOME | End: 2019-08-24
Attending: INTERNAL MEDICINE
Payer: COMMERCIAL

## 2019-08-24 DIAGNOSIS — Z12.31 VISIT FOR SCREENING MAMMOGRAM: ICD-10-CM

## 2019-08-24 PROCEDURE — 77063 BREAST TOMOSYNTHESIS BI: CPT | Performed by: INTERNAL MEDICINE

## 2019-08-24 PROCEDURE — 77067 SCR MAMMO BI INCL CAD: CPT | Performed by: INTERNAL MEDICINE

## 2019-09-17 RX ORDER — FLUTICASONE PROPIONATE 50 MCG
SPRAY, SUSPENSION (ML) NASAL
Qty: 3 BOTTLE | Refills: 1 | Status: SHIPPED | OUTPATIENT
Start: 2019-09-17 | End: 2020-03-13

## 2019-09-17 NOTE — TELEPHONE ENCOUNTER
Refill passed per [facility] protocol.     Requested Prescriptions   Pending Prescriptions Disp Refills   • FLUTICASONE PROPIONATE 50 MCG/ACT Nasal Suspension [Pharmacy Med Name: FLUTICASONE PROP 50 MCG SPRAY]  1     Sig: SHAKE LIQUID AND USE 1 SPRAY I

## 2019-09-25 NOTE — TELEPHONE ENCOUNTER
Please review; protocol failed.     Requested Prescriptions   Pending Prescriptions Disp Refills   • METFORMIN  MG Oral Tab [Pharmacy Med Name: METFORMIN  MG TABLET] 360 tablet 1     Sig: TAKE 2 TABLETS BY MOUTH TWICE DAILY       Diabetes Medi

## 2019-09-27 RX ORDER — AMLODIPINE BESYLATE 10 MG/1
TABLET ORAL
Qty: 90 TABLET | Refills: 1 | Status: SHIPPED | OUTPATIENT
Start: 2019-09-27 | End: 2020-03-27

## 2019-09-27 NOTE — TELEPHONE ENCOUNTER
Refill passed per [facility] protocol.   Hypertensive Medications  Protocol Criteria:  · Appointment scheduled in the past 6 months or in the next 3 months  · BMP or CMP in the past 12 months  · Creatinine result < 2  Recent Outpatient Visits

## 2019-10-12 NOTE — TELEPHONE ENCOUNTER
To reception staff, pls call pt for appt. Please review; protocol failed.      Requested Prescriptions     Pending Prescriptions Disp Refills   • MONTELUKAST SODIUM 10 MG Oral Tab [Pharmacy Med Name: MONTELUKAST SOD 10 MG TABLET] 90 tablet 3     Sig:

## 2019-10-13 RX ORDER — MONTELUKAST SODIUM 10 MG/1
TABLET ORAL
Qty: 90 TABLET | Refills: 0 | Status: SHIPPED | OUTPATIENT
Start: 2019-10-13 | End: 2020-03-24

## 2019-10-19 NOTE — TELEPHONE ENCOUNTER
To reception staff, pls call pt for appt.        Cholesterol Medications   Protocol Criteria:  · Appointment scheduled in the past 12 months or in the next 3 months  · ALT & LDL on file in the past 12 months  · ALT result < 80  · LDL result <130   Recent Ou

## 2019-10-20 RX ORDER — ROSUVASTATIN CALCIUM 10 MG/1
TABLET, COATED ORAL
Qty: 90 TABLET | Refills: 0 | Status: SHIPPED | OUTPATIENT
Start: 2019-10-20 | End: 2019-12-16

## 2019-11-02 ENCOUNTER — LAB ENCOUNTER (OUTPATIENT)
Dept: LAB | Age: 64
End: 2019-11-02
Attending: INTERNAL MEDICINE
Payer: COMMERCIAL

## 2019-11-02 DIAGNOSIS — E78.5 HYPERLIPIDEMIA, UNSPECIFIED HYPERLIPIDEMIA TYPE: ICD-10-CM

## 2019-11-02 DIAGNOSIS — E11.69 TYPE 2 DIABETES MELLITUS WITH OTHER SPECIFIED COMPLICATION, WITHOUT LONG-TERM CURRENT USE OF INSULIN (HCC): ICD-10-CM

## 2019-11-02 PROCEDURE — 83036 HEMOGLOBIN GLYCOSYLATED A1C: CPT

## 2019-11-02 PROCEDURE — 84460 ALANINE AMINO (ALT) (SGPT): CPT

## 2019-11-02 PROCEDURE — 84450 TRANSFERASE (AST) (SGOT): CPT

## 2019-11-02 PROCEDURE — 36415 COLL VENOUS BLD VENIPUNCTURE: CPT

## 2019-11-02 PROCEDURE — 80061 LIPID PANEL: CPT

## 2019-11-02 PROCEDURE — 80048 BASIC METABOLIC PNL TOTAL CA: CPT

## 2019-11-13 NOTE — PROGRESS NOTES
Spoke with patient, advised Dr Irma Patten note and verbalized understanding. Address verified and on file. Copy of test results mail today. With FU OV on 11/18/19 for her diabetes.     Notes recorded by Augie Lara MD on 11/13/2019 at 1:13 AM CST  Send

## 2019-11-18 ENCOUNTER — OFFICE VISIT (OUTPATIENT)
Dept: INTERNAL MEDICINE CLINIC | Facility: CLINIC | Age: 64
End: 2019-11-18
Payer: COMMERCIAL

## 2019-11-18 DIAGNOSIS — I10 ESSENTIAL HYPERTENSION: ICD-10-CM

## 2019-11-18 DIAGNOSIS — E11.69 TYPE 2 DIABETES MELLITUS WITH OTHER SPECIFIED COMPLICATION, WITHOUT LONG-TERM CURRENT USE OF INSULIN (HCC): Primary | ICD-10-CM

## 2019-11-18 DIAGNOSIS — E78.5 HYPERLIPIDEMIA, UNSPECIFIED HYPERLIPIDEMIA TYPE: ICD-10-CM

## 2019-11-18 PROCEDURE — 99214 OFFICE O/P EST MOD 30 MIN: CPT | Performed by: INTERNAL MEDICINE

## 2019-11-18 PROCEDURE — 99212 OFFICE O/P EST SF 10 MIN: CPT | Performed by: INTERNAL MEDICINE

## 2019-11-19 VITALS
SYSTOLIC BLOOD PRESSURE: 136 MMHG | HEART RATE: 94 BPM | HEIGHT: 58.5 IN | WEIGHT: 145 LBS | BODY MASS INDEX: 29.62 KG/M2 | DIASTOLIC BLOOD PRESSURE: 70 MMHG

## 2019-11-19 NOTE — PROGRESS NOTES
HPI:    Patient ID: Deny Gardner is a 71year old female.     HPI    HTN/DIABETES  Long standing history of hypertension     sympotms  :        Headache no  dizziness        no                             Blurred vision no  palpitaionsSyncope no  Chest p Medications   Medication Sig Dispense Refill   • ROSUVASTATIN CALCIUM 10 MG Oral Tab TAKE 1 TABLET BY MOUTH EVERY EVENING 90 tablet 0   • MONTELUKAST SODIUM 10 MG Oral Tab TAKE 1 TABLET BY MOUTH EVERY DAY IN THE EVENING 90 tablet 0   • AMLODIPINE BESYLATE well-nourished. No distress. HENT:   Head: Normocephalic and atraumatic. Right Ear: External ear normal.   Left Ear: External ear normal.   Nose: Nose normal.   Mouth/Throat: Oropharynx is clear and moist. No oropharyngeal exudate.    Eyes: Pupils are e 12-LEAD        /70   Pulse 94   Ht 4' 10.5\" (1.486 m)   Wt 145 lb (65.8 kg)   BMI 29.79 kg/m²   CONTROLLED  Medications and most recent test results reviewed  Refill medicaitons  as needed  Potential side effect discussed  Modification of risk for C

## 2019-12-16 RX ORDER — ROSUVASTATIN CALCIUM 10 MG/1
TABLET, COATED ORAL
Qty: 90 TABLET | Refills: 1 | Status: SHIPPED | OUTPATIENT
Start: 2019-12-16 | End: 2020-06-15

## 2019-12-16 NOTE — TELEPHONE ENCOUNTER
Refill passed per [facility] protocol.   Cholesterol Medications  Protocol Criteria:  · Appointment scheduled in the past 12 months or in the next 3 months  · ALT & LDL on file in the past 12 months  · ALT result < 80  · LDL result <130   Recent Outpat

## 2020-02-08 ENCOUNTER — LAB ENCOUNTER (OUTPATIENT)
Dept: LAB | Age: 65
End: 2020-02-08
Attending: INTERNAL MEDICINE
Payer: COMMERCIAL

## 2020-02-08 DIAGNOSIS — E11.69 TYPE 2 DIABETES MELLITUS WITH OTHER SPECIFIED COMPLICATION, WITHOUT LONG-TERM CURRENT USE OF INSULIN (HCC): ICD-10-CM

## 2020-02-08 LAB
ALBUMIN SERPL-MCNC: 3.8 G/DL (ref 3.4–5)
ALBUMIN/GLOB SERPL: 1 {RATIO} (ref 1–2)
ALP LIVER SERPL-CCNC: 65 U/L (ref 50–130)
ALT SERPL-CCNC: 20 U/L (ref 13–56)
ANION GAP SERPL CALC-SCNC: 5 MMOL/L (ref 0–18)
AST SERPL-CCNC: 16 U/L (ref 15–37)
BILIRUB SERPL-MCNC: 0.5 MG/DL (ref 0.1–2)
BUN BLD-MCNC: 16 MG/DL (ref 7–18)
BUN/CREAT SERPL: 20.8 (ref 10–20)
CALCIUM BLD-MCNC: 9.1 MG/DL (ref 8.5–10.1)
CHLORIDE SERPL-SCNC: 106 MMOL/L (ref 98–112)
CHOLEST SMN-MCNC: 173 MG/DL (ref ?–200)
CO2 SERPL-SCNC: 29 MMOL/L (ref 21–32)
CREAT BLD-MCNC: 0.77 MG/DL (ref 0.55–1.02)
DEPRECATED RDW RBC AUTO: 39.2 FL (ref 35.1–46.3)
ERYTHROCYTE [DISTWIDTH] IN BLOOD BY AUTOMATED COUNT: 11.5 % (ref 11–15)
EST. AVERAGE GLUCOSE BLD GHB EST-MCNC: 177 MG/DL (ref 68–126)
GLOBULIN PLAS-MCNC: 3.9 G/DL (ref 2.8–4.4)
GLUCOSE BLD-MCNC: 153 MG/DL (ref 70–99)
HBA1C MFR BLD HPLC: 7.8 % (ref ?–5.7)
HCT VFR BLD AUTO: 40.1 % (ref 35–48)
HDLC SERPL-MCNC: 81 MG/DL (ref 40–59)
HGB BLD-MCNC: 13.1 G/DL (ref 12–16)
HYALINE CASTS #/AREA URNS AUTO: 1 /LPF
LDLC SERPL CALC-MCNC: 77 MG/DL (ref ?–100)
M PROTEIN MFR SERPL ELPH: 7.7 G/DL (ref 6.4–8.2)
MCH RBC QN AUTO: 30.1 PG (ref 26–34)
MCHC RBC AUTO-ENTMCNC: 32.7 G/DL (ref 31–37)
MCV RBC AUTO: 92.2 FL (ref 80–100)
NONHDLC SERPL-MCNC: 92 MG/DL (ref ?–130)
OSMOLALITY SERPL CALC.SUM OF ELEC: 294 MOSM/KG (ref 275–295)
PATIENT FASTING Y/N/NP: YES
PATIENT FASTING Y/N/NP: YES
PLATELET # BLD AUTO: 299 10(3)UL (ref 150–450)
POTASSIUM SERPL-SCNC: 4.8 MMOL/L (ref 3.5–5.1)
PROT UR-MCNC: 19.1 MG/DL
RBC # BLD AUTO: 4.35 X10(6)UL (ref 3.8–5.3)
RBC #/AREA URNS AUTO: 1 /HPF
SODIUM SERPL-SCNC: 140 MMOL/L (ref 136–145)
T4 FREE SERPL-MCNC: 1.2 NG/DL (ref 0.8–1.7)
TRIGL SERPL-MCNC: 76 MG/DL (ref 30–149)
TSI SER-ACNC: 1.23 MIU/ML (ref 0.36–3.74)
VLDLC SERPL CALC-MCNC: 15 MG/DL (ref 0–30)
WBC # BLD AUTO: 5.4 X10(3) UL (ref 4–11)
WBC #/AREA URNS AUTO: 11 /HPF

## 2020-02-08 PROCEDURE — 87086 URINE CULTURE/COLONY COUNT: CPT

## 2020-02-08 PROCEDURE — 84156 ASSAY OF PROTEIN URINE: CPT | Performed by: INTERNAL MEDICINE

## 2020-02-08 PROCEDURE — 84443 ASSAY THYROID STIM HORMONE: CPT | Performed by: INTERNAL MEDICINE

## 2020-02-08 PROCEDURE — 84439 ASSAY OF FREE THYROXINE: CPT | Performed by: INTERNAL MEDICINE

## 2020-02-08 PROCEDURE — 83036 HEMOGLOBIN GLYCOSYLATED A1C: CPT | Performed by: INTERNAL MEDICINE

## 2020-02-08 PROCEDURE — 81015 MICROSCOPIC EXAM OF URINE: CPT

## 2020-02-08 PROCEDURE — 80061 LIPID PANEL: CPT | Performed by: INTERNAL MEDICINE

## 2020-02-08 PROCEDURE — 80053 COMPREHEN METABOLIC PANEL: CPT | Performed by: INTERNAL MEDICINE

## 2020-02-08 PROCEDURE — 36415 COLL VENOUS BLD VENIPUNCTURE: CPT | Performed by: INTERNAL MEDICINE

## 2020-02-08 PROCEDURE — 85027 COMPLETE CBC AUTOMATED: CPT | Performed by: INTERNAL MEDICINE

## 2020-03-13 RX ORDER — FLUTICASONE PROPIONATE 50 MCG
SPRAY, SUSPENSION (ML) NASAL
Qty: 3 BOTTLE | Refills: 1 | Status: SHIPPED | OUTPATIENT
Start: 2020-03-13 | End: 2020-08-12

## 2020-03-13 NOTE — TELEPHONE ENCOUNTER
Refill passed per [facility] protocol.   Refill Protocol Appointment Criteria  · Appointment scheduled in the past 12 months or in the next 3 months  Recent Outpatient Visits            3 months ago Type 2 diabetes mellitus with other specified complic

## 2020-03-16 ENCOUNTER — OFFICE VISIT (OUTPATIENT)
Dept: INTERNAL MEDICINE CLINIC | Facility: CLINIC | Age: 65
End: 2020-03-16
Payer: COMMERCIAL

## 2020-03-16 VITALS
HEART RATE: 82 BPM | SYSTOLIC BLOOD PRESSURE: 129 MMHG | WEIGHT: 144 LBS | HEIGHT: 58.5 IN | TEMPERATURE: 98 F | BODY MASS INDEX: 29.42 KG/M2 | DIASTOLIC BLOOD PRESSURE: 71 MMHG

## 2020-03-16 DIAGNOSIS — E78.5 HYPERLIPIDEMIA, UNSPECIFIED HYPERLIPIDEMIA TYPE: ICD-10-CM

## 2020-03-16 DIAGNOSIS — R21 RASH: ICD-10-CM

## 2020-03-16 DIAGNOSIS — E11.69 TYPE 2 DIABETES MELLITUS WITH OTHER SPECIFIED COMPLICATION, WITHOUT LONG-TERM CURRENT USE OF INSULIN (HCC): Primary | ICD-10-CM

## 2020-03-16 DIAGNOSIS — I10 ESSENTIAL HYPERTENSION: ICD-10-CM

## 2020-03-16 PROCEDURE — 99212 OFFICE O/P EST SF 10 MIN: CPT | Performed by: INTERNAL MEDICINE

## 2020-03-16 PROCEDURE — 99214 OFFICE O/P EST MOD 30 MIN: CPT | Performed by: INTERNAL MEDICINE

## 2020-03-16 RX ORDER — CLOTRIMAZOLE AND BETAMETHASONE DIPROPIONATE 10; .64 MG/G; MG/G
1 CREAM TOPICAL 2 TIMES DAILY
Qty: 15 G | Refills: 3 | Status: SHIPPED | OUTPATIENT
Start: 2020-03-16 | End: 2020-08-12

## 2020-03-16 NOTE — PROGRESS NOTES
HPI:    Patient ID: Ana Ibrahim is a 71year old female. HPI    Diabetes  Follow upvisit. type 2 diabetes mellitus.    There are no hypoglycemic associated symptoms  Denies   blurred vision, chest pain,atigue,    foot paresthesiasfoot ulceration Neurological: Negative for syncope, weakness, light-headedness and headaches. Hematological: Negative for adenopathy. Does not bruise/bleed easily. Psychiatric/Behavioral: Negative for agitation and behavioral problems.          Current Outpatient Med Tobacco Use      Smoking status: Never Smoker      Smokeless tobacco: Never Used    Alcohol use:  Yes      Alcohol/week: 0.0 standard drinks      Comment: 1-2 glasses yearly    Drug use: No       PHYSICAL EXAM:    Physical Exam   Constitutional: She appears 129/71 (BP Location: Right arm, Patient Position: Sitting, Cuff Size: adult)   Pulse 82   Temp 97.9 °F (36.6 °C) (Oral)   Ht 4' 10.5\" (1.486 m)   Wt 144 lb (65.3 kg)   BMI 29.58 kg/m²    controlled CPM    (E78.5) Hyperlipidemia, unspecified hyperlipidemia

## 2020-03-24 ENCOUNTER — TELEPHONE (OUTPATIENT)
Dept: INTERNAL MEDICINE CLINIC | Facility: CLINIC | Age: 65
End: 2020-03-24

## 2020-03-24 NOTE — TELEPHONE ENCOUNTER
90 day supply - Fax to pharmacy        Current Outpatient Medications:   •  •  MONTELUKAST SODIUM 10 MG Oral Tab, TAKE 1 TABLET BY MOUTH EVERY DAY IN THE EVENING, Disp: 90 tablet, Rfl: 0

## 2020-03-26 ENCOUNTER — TELEPHONE (OUTPATIENT)
Dept: INTERNAL MEDICINE CLINIC | Facility: CLINIC | Age: 65
End: 2020-03-26

## 2020-03-26 NOTE — TELEPHONE ENCOUNTER
Pt requests new Rx: One Touch Test Strips and Lancets        Current Outpatient Medications:     •  Glucose Blood (ONETOUCH ULTRA BLUE) In Vitro Strip, USE WITH GLUCOMETER TWICE A DAY, Disp: 100 strip, Rfl: 5  •  ONETOUCH DELICA LANCETS 90L Does not apply

## 2020-03-27 RX ORDER — AMLODIPINE BESYLATE 10 MG/1
10 TABLET ORAL
Qty: 90 TABLET | Refills: 1 | Status: SHIPPED | OUTPATIENT
Start: 2020-03-27 | End: 2020-10-06

## 2020-03-27 RX ORDER — GLIMEPIRIDE 2 MG/1
2 TABLET ORAL
Qty: 90 TABLET | Refills: 3 | Status: SHIPPED | OUTPATIENT
Start: 2020-03-27 | End: 2021-01-29

## 2020-03-27 NOTE — TELEPHONE ENCOUNTER
Current Outpatient Medications:   •  AMLODIPINE BESYLATE 10 MG Oral Tab, TAKE 1 TABLET BY MOUTH EVERY DAY, Disp: 90 tablet, Rfl: 1

## 2020-03-27 NOTE — TELEPHONE ENCOUNTER
This is being sent to you without review by the Triage staff due to the high call volumes created by the COVID-19 virus, per the email sent by Dr. Amber Espinoza on 3/19/20. Thank you for your support.     Centralized Nurse Triage Team

## 2020-03-27 NOTE — TELEPHONE ENCOUNTER
•  glimepiride 2 MG Oral Tab, Take 1 tablet (2 mg total) by mouth every morning before breakfast., Disp: 90 tablet, Rfl: 3

## 2020-03-27 NOTE — TELEPHONE ENCOUNTER
This is being sent to you without review by the Triage staff due to the high call volumes created by the COVID-19 virus, per the email sent by Dr. Birgit Almaraz on 3/19/20. Thank you for your support.     Centralized Nurse Triage Team

## 2020-05-15 ENCOUNTER — TELEPHONE (OUTPATIENT)
Dept: INTERNAL MEDICINE CLINIC | Facility: CLINIC | Age: 65
End: 2020-05-15

## 2020-05-15 NOTE — TELEPHONE ENCOUNTER
[facility] DR LUZMARIA WARE is requesting a New prescription for [place] Next Test Strips and Lancets. The previous prescription is not covered by the patients insurance.  [facility] faxed a request today 05/15/20

## 2020-06-15 ENCOUNTER — TELEPHONE (OUTPATIENT)
Dept: INTERNAL MEDICINE CLINIC | Facility: CLINIC | Age: 65
End: 2020-06-15

## 2020-07-25 ENCOUNTER — APPOINTMENT (OUTPATIENT)
Dept: LAB | Age: 65
End: 2020-07-25
Attending: INTERNAL MEDICINE
Payer: COMMERCIAL

## 2020-07-25 LAB
ALT SERPL-CCNC: 26 U/L (ref 13–56)
ANION GAP SERPL CALC-SCNC: 5 MMOL/L (ref 0–18)
AST SERPL-CCNC: 16 U/L (ref 15–37)
BUN BLD-MCNC: 14 MG/DL (ref 7–18)
BUN/CREAT SERPL: 16.9 (ref 10–20)
CALCIUM BLD-MCNC: 8.8 MG/DL (ref 8.5–10.1)
CHLORIDE SERPL-SCNC: 106 MMOL/L (ref 98–112)
CHOLEST SMN-MCNC: 145 MG/DL (ref ?–200)
CO2 SERPL-SCNC: 28 MMOL/L (ref 21–32)
CREAT BLD-MCNC: 0.83 MG/DL (ref 0.55–1.02)
EST. AVERAGE GLUCOSE BLD GHB EST-MCNC: 214 MG/DL (ref 68–126)
GLUCOSE BLD-MCNC: 176 MG/DL (ref 70–99)
HBA1C MFR BLD HPLC: 9.1 % (ref ?–5.7)
HDLC SERPL-MCNC: 76 MG/DL (ref 40–59)
LDLC SERPL CALC-MCNC: 51 MG/DL (ref ?–100)
NONHDLC SERPL-MCNC: 69 MG/DL (ref ?–130)
OSMOLALITY SERPL CALC.SUM OF ELEC: 293 MOSM/KG (ref 275–295)
PATIENT FASTING Y/N/NP: YES
PATIENT FASTING Y/N/NP: YES
POTASSIUM SERPL-SCNC: 4.6 MMOL/L (ref 3.5–5.1)
SODIUM SERPL-SCNC: 139 MMOL/L (ref 136–145)
TRIGL SERPL-MCNC: 88 MG/DL (ref 30–149)
VLDLC SERPL CALC-MCNC: 18 MG/DL (ref 0–30)

## 2020-07-25 PROCEDURE — 84450 TRANSFERASE (AST) (SGOT): CPT | Performed by: INTERNAL MEDICINE

## 2020-07-25 PROCEDURE — 36415 COLL VENOUS BLD VENIPUNCTURE: CPT | Performed by: INTERNAL MEDICINE

## 2020-07-25 PROCEDURE — 83036 HEMOGLOBIN GLYCOSYLATED A1C: CPT | Performed by: INTERNAL MEDICINE

## 2020-07-25 PROCEDURE — 80048 BASIC METABOLIC PNL TOTAL CA: CPT | Performed by: INTERNAL MEDICINE

## 2020-07-25 PROCEDURE — 80061 LIPID PANEL: CPT | Performed by: INTERNAL MEDICINE

## 2020-07-25 PROCEDURE — 84460 ALANINE AMINO (ALT) (SGPT): CPT | Performed by: INTERNAL MEDICINE

## 2020-08-12 ENCOUNTER — OFFICE VISIT (OUTPATIENT)
Dept: INTERNAL MEDICINE CLINIC | Facility: CLINIC | Age: 65
End: 2020-08-12
Payer: COMMERCIAL

## 2020-08-12 VITALS
HEART RATE: 96 BPM | HEIGHT: 58.5 IN | DIASTOLIC BLOOD PRESSURE: 80 MMHG | SYSTOLIC BLOOD PRESSURE: 136 MMHG | BODY MASS INDEX: 31.06 KG/M2 | WEIGHT: 152 LBS | TEMPERATURE: 98 F

## 2020-08-12 DIAGNOSIS — E78.5 HYPERLIPIDEMIA, UNSPECIFIED HYPERLIPIDEMIA TYPE: ICD-10-CM

## 2020-08-12 DIAGNOSIS — E11.69 TYPE 2 DIABETES MELLITUS WITH OTHER SPECIFIED COMPLICATION, WITHOUT LONG-TERM CURRENT USE OF INSULIN (HCC): Primary | ICD-10-CM

## 2020-08-12 DIAGNOSIS — Z12.31 VISIT FOR SCREENING MAMMOGRAM: ICD-10-CM

## 2020-08-12 DIAGNOSIS — I10 ESSENTIAL HYPERTENSION: ICD-10-CM

## 2020-08-12 PROCEDURE — 3079F DIAST BP 80-89 MM HG: CPT | Performed by: INTERNAL MEDICINE

## 2020-08-12 PROCEDURE — 99214 OFFICE O/P EST MOD 30 MIN: CPT | Performed by: INTERNAL MEDICINE

## 2020-08-12 PROCEDURE — 3075F SYST BP GE 130 - 139MM HG: CPT | Performed by: INTERNAL MEDICINE

## 2020-08-12 PROCEDURE — 3008F BODY MASS INDEX DOCD: CPT | Performed by: INTERNAL MEDICINE

## 2020-08-12 RX ORDER — CLOTRIMAZOLE AND BETAMETHASONE DIPROPIONATE 10; .64 MG/G; MG/G
1 CREAM TOPICAL 2 TIMES DAILY
Qty: 15 G | Refills: 3 | Status: SHIPPED | OUTPATIENT
Start: 2020-08-12

## 2020-08-12 RX ORDER — FLUTICASONE PROPIONATE 50 MCG
1 SPRAY, SUSPENSION (ML) NASAL 2 TIMES DAILY
Qty: 3 BOTTLE | Refills: 1 | Status: SHIPPED | OUTPATIENT
Start: 2020-08-12 | End: 2021-09-20

## 2020-08-12 NOTE — PROGRESS NOTES
HPI:    Patient ID: Diane Koenig is a 71year old female. HPI  Diabetes  Follow upvisit. type 2 diabetes mellitus.    There are no hypoglycemic associated symptoms  Denies   blurred vision, chest pain,atigue,    foot paresthesiasfoot ulcerations, Musculoskeletal: Negative for back pain, gait problem and joint swelling. Skin: Negative for rash. Neurological: Negative for syncope, weakness, light-headedness and headaches. Hematological: Negative for adenopathy. Does not bruise/bleed easily. COPD Cause of Death   • Heart Disorder Brother 46        Congestive Heart Failure      Social History: Social History    Tobacco Use      Smoking status: Never Smoker      Smokeless tobacco: Never Used    Alcohol use:  Yes      Alcohol/week: 0.0 standard dr advised  Recommended additon of 3rd drug pt refules  Will take metfromin 4 a day no problem with med  Dietary change and compliance with low carb diet    (I10) Essential hypertension  Plan: controlledCPM    (E78.5) Hyperlipidemia, unspecified hyperlipidemi

## 2020-08-12 NOTE — PATIENT INSTRUCTIONS
Component      Latest Ref Rng & Units 7/25/2020 7/25/2020           9:09 AM  9:09 AM   Glucose      70 - 99 mg/dL  176 (H)   Sodium      136 - 145 mmol/L  139   Potassium      3.5 - 5.1 mmol/L  4.6   Chloride      98 - 112 mmol/L  106   Carbon Dioxide, Tot

## 2020-08-18 NOTE — TELEPHONE ENCOUNTER
Patient needs a refill on    Rosuvastatin Calcium 10 MG Oral Tab 90 tablet 1 6/15/2020     Sig - Route: Take 1 tablet (10 mg total) by mouth every evening. Thank you.

## 2020-08-19 RX ORDER — ROSUVASTATIN CALCIUM 10 MG/1
10 TABLET, COATED ORAL EVERY EVENING
Qty: 90 TABLET | Refills: 1 | Status: SHIPPED | OUTPATIENT
Start: 2020-08-19 | End: 2021-02-17

## 2020-08-27 ENCOUNTER — HOSPITAL ENCOUNTER (OUTPATIENT)
Dept: MAMMOGRAPHY | Age: 65
Discharge: HOME | End: 2020-08-27
Attending: INTERNAL MEDICINE
Payer: COMMERCIAL

## 2020-08-27 DIAGNOSIS — Z12.31 VISIT FOR SCREENING MAMMOGRAM: ICD-10-CM

## 2020-08-27 PROCEDURE — 77063 BREAST TOMOSYNTHESIS BI: CPT | Performed by: INTERNAL MEDICINE

## 2020-08-27 PROCEDURE — 77067 SCR MAMMO BI INCL CAD: CPT | Performed by: INTERNAL MEDICINE

## 2020-09-21 ENCOUNTER — TELEPHONE (OUTPATIENT)
Dept: FAMILY MEDICINE CLINIC | Facility: CLINIC | Age: 65
End: 2020-09-21

## 2020-09-21 DIAGNOSIS — H92.09 OTALGIA, UNSPECIFIED LATERALITY: Primary | ICD-10-CM

## 2020-09-21 NOTE — TELEPHONE ENCOUNTER
Patient requesting a referral to Dr. Jay Flannery for her ongoing left ear pain (\"and sometimes the right ear too\"). Referral pended.

## 2020-09-24 ENCOUNTER — OFFICE VISIT (OUTPATIENT)
Dept: OTOLARYNGOLOGY | Facility: CLINIC | Age: 65
End: 2020-09-24
Payer: COMMERCIAL

## 2020-09-24 VITALS — DIASTOLIC BLOOD PRESSURE: 78 MMHG | SYSTOLIC BLOOD PRESSURE: 140 MMHG | TEMPERATURE: 98 F

## 2020-09-24 DIAGNOSIS — H93.8X1 PRESSURE SENSATION IN RIGHT EAR: ICD-10-CM

## 2020-09-24 DIAGNOSIS — R07.0 THROAT DISCOMFORT: Primary | ICD-10-CM

## 2020-09-24 PROCEDURE — 3077F SYST BP >= 140 MM HG: CPT | Performed by: OTOLARYNGOLOGY

## 2020-09-24 PROCEDURE — 3078F DIAST BP <80 MM HG: CPT | Performed by: OTOLARYNGOLOGY

## 2020-09-24 PROCEDURE — 99203 OFFICE O/P NEW LOW 30 MIN: CPT | Performed by: OTOLARYNGOLOGY

## 2020-09-24 RX ORDER — LORATADINE 10 MG/1
10 TABLET ORAL DAILY
Qty: 30 TABLET | Refills: 3 | Status: SHIPPED | OUTPATIENT
Start: 2020-09-24 | End: 2021-02-03

## 2020-09-24 RX ORDER — FLUTICASONE PROPIONATE 50 MCG
1 SPRAY, SUSPENSION (ML) NASAL 2 TIMES DAILY
Qty: 1 BOTTLE | Refills: 3 | Status: SHIPPED | OUTPATIENT
Start: 2020-09-24

## 2020-09-24 RX ORDER — MONTELUKAST SODIUM 10 MG/1
TABLET ORAL
Qty: 90 TABLET | Refills: 0 | Status: SHIPPED | OUTPATIENT
Start: 2020-09-24 | End: 2021-02-24

## 2020-09-24 RX ORDER — MONTELUKAST SODIUM 10 MG/1
10 TABLET ORAL NIGHTLY
Qty: 30 TABLET | Refills: 3 | Status: SHIPPED | OUTPATIENT
Start: 2020-09-24 | End: 2021-02-01

## 2020-09-25 NOTE — PROGRESS NOTES
Yadira Ruiz is a 71year old female. Patient presents with:  Ear Problem: Patient Presents with Right Ear pain, per pt Hears a Pulse sensation for 2 wks       HISTORY OF PRESENT ILLNESS  2015  I have seen her in the past for pharyngitis.   Recently she History:   Diagnosis Date   • Diabetes ([facility] [address]    • Elevated lipids     Hyperlipidemia       Past Surgical History:   Procedure Laterality Date   • COLONOSCOPY & POLYPECTOMY     • ELECTROCARDIOGRAM, COMPLETE  06/05/2014    Scanned to Media Tab   • HYSTERECTO Anterior cervical. Posterior cervical. Supraclavicular.         Nose/Mouth/Throat Normal External nose - Normal. Lips/teeth/gums - Normal. Tonsils - Normal. Oropharynx - Normal.   Nose/Mouth/Throat Normal Nares - Right: Normal Left: Normal. Septum -Normal discomfort    2. Pressure sensation in right ear  Normal ear and throat exam.  I suspect postnasal discharge related to nasal congestion and eustachian tube dysfunction causing her ear pressure.   Start Singulair loratadine and fluticasone once again return

## 2020-10-06 RX ORDER — AMLODIPINE BESYLATE 10 MG/1
TABLET ORAL
Qty: 90 TABLET | Refills: 0 | Status: SHIPPED | OUTPATIENT
Start: 2020-10-06 | End: 2020-12-01

## 2020-10-14 ENCOUNTER — TELEPHONE (OUTPATIENT)
Dept: INTERNAL MEDICINE CLINIC | Facility: CLINIC | Age: 65
End: 2020-10-14

## 2020-10-14 RX ORDER — BLOOD SUGAR DIAGNOSTIC
STRIP MISCELLANEOUS
Qty: 200 STRIP | Refills: 2 | Status: SHIPPED | OUTPATIENT
Start: 2020-10-14 | End: 2021-04-08

## 2020-10-14 RX ORDER — BLOOD-GLUCOSE METER
EACH MISCELLANEOUS
Qty: 1 KIT | Refills: 0 | Status: SHIPPED | OUTPATIENT
Start: 2020-10-14

## 2020-10-14 RX ORDER — LANCETS
EACH MISCELLANEOUS
Qty: 200 EACH | Refills: 2 | Status: SHIPPED | OUTPATIENT
Start: 2020-10-14 | End: 2020-11-25

## 2020-10-14 NOTE — TELEPHONE ENCOUNTER
Rx approved per diabetic supply protocol.         Diabetes Medications  Protocol Criteria:  · Appointment scheduled in the past 6 months or the next 3 months  · A1C < 7.5 in the past 6 months  · Creatinine in the past 12 months  · Creatinine result < 1.5

## 2020-10-14 NOTE — TELEPHONE ENCOUNTER
Patient states insurance no longer covers OneTouch and requesting a new monitor, stripes and lancets, and states out of supplies, please advise

## 2020-11-05 ENCOUNTER — TELEPHONE (OUTPATIENT)
Dept: GASTROENTEROLOGY | Facility: CLINIC | Age: 65
End: 2020-11-05

## 2020-11-05 NOTE — TELEPHONE ENCOUNTER
----- Message from Rebeca Maguire RN sent at 12/21/2015  8:38 AM CST -----  Regarding: colon recall  Repeat colon in 5 years per Dr Beata Chaudhari

## 2020-11-24 ENCOUNTER — TELEPHONE (OUTPATIENT)
Dept: GASTROENTEROLOGY | Facility: CLINIC | Age: 65
End: 2020-11-24

## 2020-11-24 DIAGNOSIS — Z86.010 H/O ADENOMATOUS POLYP OF COLON: Primary | ICD-10-CM

## 2020-11-25 RX ORDER — LANCETS
EACH MISCELLANEOUS
Qty: 200 EACH | Refills: 2 | Status: SHIPPED | OUTPATIENT
Start: 2020-11-25

## 2020-11-25 NOTE — TELEPHONE ENCOUNTER
Message from Don Mccloud 78 - Can MD send us a new order. We dispensed the wrong brand lancets and need a new Rx for Medicare. Medicare does not allow us to change the existing Rx because we previously dispensed. Sorry for the inconvenience.       Cu

## 2020-11-27 RX ORDER — POLYETHYLENE GLYCOL 3350, SODIUM CHLORIDE, POTASSIUM CHLORIDE, SODIUM BICARBONATE, AND SODIUM SULFATE 240; 5.84; 2.98; 6.72; 22.72 G/4L; G/4L; G/4L; G/4L; G/4L
POWDER, FOR SOLUTION ORAL
Qty: 1 BOTTLE | Refills: 0 | Status: SHIPPED | OUTPATIENT
Start: 2020-11-27 | End: 2021-09-20 | Stop reason: ALTCHOICE

## 2020-11-27 NOTE — TELEPHONE ENCOUNTER
Thanks Lynn. Recent visit with Dr. Jeanette Rivas 8/12/2020 and 3/16/2020 reviewed. Colonoscopy exam 12/5/2015 reviewed.     GI schedulers –    Please schedule colonoscopy exam at [facility] ([address]    BMI Readings from Last 1 E

## 2020-11-27 NOTE — TELEPHONE ENCOUNTER
Bradly; ,    Regarding: colon recall  Repeat colon in 5 years per Dr Danya Meek was 12/5/2015  Last prep was given Golytely  Patient on Diabetic Medication,please advise and thank you.

## 2020-12-01 RX ORDER — AMLODIPINE BESYLATE 10 MG/1
TABLET ORAL
Qty: 90 TABLET | Refills: 0 | Status: SHIPPED | OUTPATIENT
Start: 2020-12-01 | End: 2021-01-29

## 2021-01-27 NOTE — TELEPHONE ENCOUNTER
Called patient to schedule her procedure for Colonoscopy at [facility] ,patient agreed with the plan.     Scheduled for:  Colonoscopy [address]172  Provider Name:  London Edwards  Date:  5/8/2021  Location:  [facility]  Sedation:  Ivcs  Time:  10:00 Am , (pt is aware to arrive at 09

## 2021-01-29 RX ORDER — AMLODIPINE BESYLATE 10 MG/1
TABLET ORAL
Qty: 90 TABLET | Refills: 0 | Status: SHIPPED | OUTPATIENT
Start: 2021-01-29 | End: 2021-02-24

## 2021-01-29 RX ORDER — GLIMEPIRIDE 2 MG/1
TABLET ORAL
Qty: 90 TABLET | Refills: 0 | Status: SHIPPED | OUTPATIENT
Start: 2021-01-29 | End: 2021-02-24

## 2021-02-01 RX ORDER — MONTELUKAST SODIUM 10 MG/1
TABLET ORAL
Qty: 30 TABLET | Refills: 2 | Status: SHIPPED | OUTPATIENT
Start: 2021-02-01 | End: 2021-05-13

## 2021-02-01 NOTE — TELEPHONE ENCOUNTER
This refill request is being sent to the provider for the following reason:  [][][x]Patient did not complete follow up recommendations,pt last visit on 9/24/2020

## 2021-02-06 DIAGNOSIS — Z23 NEED FOR VACCINATION: ICD-10-CM

## 2021-02-17 RX ORDER — ROSUVASTATIN CALCIUM 10 MG/1
10 TABLET, COATED ORAL EVERY EVENING
Qty: 90 TABLET | Refills: 1 | Status: SHIPPED | OUTPATIENT
Start: 2021-02-17 | End: 2021-08-09

## 2021-02-17 NOTE — TELEPHONE ENCOUNTER
REFILL AUTH REQUEST      Current Outpatient Medications:   •  Rosuvastatin Calcium 10 MG Oral Tab, Take 1 tablet (10 mg total) by mouth every evening., Disp: 90 tablet, Rfl: 1

## 2021-02-24 ENCOUNTER — OFFICE VISIT (OUTPATIENT)
Dept: INTERNAL MEDICINE CLINIC | Facility: CLINIC | Age: 66
End: 2021-02-24
Payer: COMMERCIAL

## 2021-02-24 VITALS
HEIGHT: 58.5 IN | DIASTOLIC BLOOD PRESSURE: 79 MMHG | WEIGHT: 149 LBS | SYSTOLIC BLOOD PRESSURE: 143 MMHG | HEART RATE: 93 BPM | BODY MASS INDEX: 30.44 KG/M2

## 2021-02-24 DIAGNOSIS — Z78.0 POSTMENOPAUSAL: ICD-10-CM

## 2021-02-24 DIAGNOSIS — E11.69 TYPE 2 DIABETES MELLITUS WITH OTHER SPECIFIED COMPLICATION, WITHOUT LONG-TERM CURRENT USE OF INSULIN (HCC): Primary | ICD-10-CM

## 2021-02-24 DIAGNOSIS — R80.9 DIABETES MELLITUS WITH PROTEINURIA (HCC): ICD-10-CM

## 2021-02-24 DIAGNOSIS — I10 ESSENTIAL HYPERTENSION: ICD-10-CM

## 2021-02-24 DIAGNOSIS — E11.29 DIABETES MELLITUS WITH PROTEINURIA (HCC): ICD-10-CM

## 2021-02-24 DIAGNOSIS — E78.5 HYPERLIPIDEMIA, UNSPECIFIED HYPERLIPIDEMIA TYPE: ICD-10-CM

## 2021-02-24 DIAGNOSIS — Z12.31 VISIT FOR SCREENING MAMMOGRAM: ICD-10-CM

## 2021-02-24 PROCEDURE — 99214 OFFICE O/P EST MOD 30 MIN: CPT | Performed by: INTERNAL MEDICINE

## 2021-02-24 PROCEDURE — 3077F SYST BP >= 140 MM HG: CPT | Performed by: INTERNAL MEDICINE

## 2021-02-24 PROCEDURE — 3008F BODY MASS INDEX DOCD: CPT | Performed by: INTERNAL MEDICINE

## 2021-02-24 PROCEDURE — 3078F DIAST BP <80 MM HG: CPT | Performed by: INTERNAL MEDICINE

## 2021-02-24 RX ORDER — AMLODIPINE BESYLATE 10 MG/1
10 TABLET ORAL DAILY
Qty: 90 TABLET | Refills: 1 | Status: SHIPPED | OUTPATIENT
Start: 2021-02-24 | End: 2021-08-09

## 2021-02-24 RX ORDER — GLIMEPIRIDE 2 MG/1
2 TABLET ORAL
Qty: 90 TABLET | Refills: 1 | Status: SHIPPED | OUTPATIENT
Start: 2021-02-24 | End: 2021-04-08

## 2021-02-25 ENCOUNTER — LAB ENCOUNTER (OUTPATIENT)
Dept: LAB | Age: 66
End: 2021-02-25
Attending: INTERNAL MEDICINE
Payer: COMMERCIAL

## 2021-02-25 DIAGNOSIS — I10 ESSENTIAL HYPERTENSION: ICD-10-CM

## 2021-02-25 LAB
ALBUMIN SERPL-MCNC: 4.2 G/DL (ref 3.4–5)
ALBUMIN/GLOB SERPL: 1.3 {RATIO} (ref 1–2)
ALP LIVER SERPL-CCNC: 63 U/L
ALT SERPL-CCNC: 25 U/L
ANION GAP SERPL CALC-SCNC: 4 MMOL/L (ref 0–18)
AST SERPL-CCNC: 14 U/L (ref 15–37)
BILIRUB SERPL-MCNC: 0.6 MG/DL (ref 0.1–2)
BUN BLD-MCNC: 11 MG/DL (ref 7–18)
BUN/CREAT SERPL: 13.4 (ref 10–20)
CALCIUM BLD-MCNC: 8.9 MG/DL (ref 8.5–10.1)
CHLORIDE SERPL-SCNC: 106 MMOL/L (ref 98–112)
CHOLEST SMN-MCNC: 151 MG/DL (ref ?–200)
CO2 SERPL-SCNC: 30 MMOL/L (ref 21–32)
CREAT BLD-MCNC: 0.82 MG/DL
DEPRECATED RDW RBC AUTO: 38.6 FL (ref 35.1–46.3)
ERYTHROCYTE [DISTWIDTH] IN BLOOD BY AUTOMATED COUNT: 11.3 % (ref 11–15)
EST. AVERAGE GLUCOSE BLD GHB EST-MCNC: 220 MG/DL (ref 68–126)
GLOBULIN PLAS-MCNC: 3.2 G/DL (ref 2.8–4.4)
GLUCOSE BLD-MCNC: 172 MG/DL (ref 70–99)
HBA1C MFR BLD HPLC: 9.3 % (ref ?–5.7)
HCT VFR BLD AUTO: 41.2 %
HDLC SERPL-MCNC: 74 MG/DL (ref 40–59)
HGB BLD-MCNC: 13.3 G/DL
LDLC SERPL CALC-MCNC: 61 MG/DL (ref ?–100)
M PROTEIN MFR SERPL ELPH: 7.4 G/DL (ref 6.4–8.2)
MCH RBC QN AUTO: 29.8 PG (ref 26–34)
MCHC RBC AUTO-ENTMCNC: 32.3 G/DL (ref 31–37)
MCV RBC AUTO: 92.4 FL
NONHDLC SERPL-MCNC: 77 MG/DL (ref ?–130)
OSMOLALITY SERPL CALC.SUM OF ELEC: 293 MOSM/KG (ref 275–295)
PATIENT FASTING Y/N/NP: YES
PATIENT FASTING Y/N/NP: YES
PLATELET # BLD AUTO: 275 10(3)UL (ref 150–450)
POTASSIUM SERPL-SCNC: 4.8 MMOL/L (ref 3.5–5.1)
PROT UR-MCNC: 24.6 MG/DL
RBC # BLD AUTO: 4.46 X10(6)UL
SODIUM SERPL-SCNC: 140 MMOL/L (ref 136–145)
T4 FREE SERPL-MCNC: 1.3 NG/DL (ref 0.8–1.7)
TRIGL SERPL-MCNC: 82 MG/DL (ref 30–149)
TSI SER-ACNC: 1.7 MIU/ML (ref 0.36–3.74)
VLDLC SERPL CALC-MCNC: 16 MG/DL (ref 0–30)
WBC # BLD AUTO: 5.3 X10(3) UL (ref 4–11)

## 2021-02-25 PROCEDURE — 36415 COLL VENOUS BLD VENIPUNCTURE: CPT | Performed by: INTERNAL MEDICINE

## 2021-02-25 PROCEDURE — 84156 ASSAY OF PROTEIN URINE: CPT | Performed by: INTERNAL MEDICINE

## 2021-02-25 PROCEDURE — 84443 ASSAY THYROID STIM HORMONE: CPT | Performed by: INTERNAL MEDICINE

## 2021-02-25 PROCEDURE — 81015 MICROSCOPIC EXAM OF URINE: CPT

## 2021-02-25 PROCEDURE — 3046F HEMOGLOBIN A1C LEVEL >9.0%: CPT | Performed by: INTERNAL MEDICINE

## 2021-02-25 PROCEDURE — 80061 LIPID PANEL: CPT | Performed by: INTERNAL MEDICINE

## 2021-02-25 PROCEDURE — 80053 COMPREHEN METABOLIC PANEL: CPT | Performed by: INTERNAL MEDICINE

## 2021-02-25 PROCEDURE — 85027 COMPLETE CBC AUTOMATED: CPT | Performed by: INTERNAL MEDICINE

## 2021-02-25 PROCEDURE — 84439 ASSAY OF FREE THYROXINE: CPT | Performed by: INTERNAL MEDICINE

## 2021-02-25 PROCEDURE — 83036 HEMOGLOBIN GLYCOSYLATED A1C: CPT | Performed by: INTERNAL MEDICINE

## 2021-02-26 NOTE — PROGRESS NOTES
HPI:    Patient ID: Vi Isabel is a 76year old female. HPI    Diabetes  Follow upvisit. type 2 diabetes mellitus.    There are no hypoglycemic associated symptoms  Denies   blurred vision, chest pain,atigue,    foot paresthesiasfoot ulceration easily. Psychiatric/Behavioral: Negative for agitation and behavioral problems.          Current Outpatient Medications   Medication Sig Dispense Refill   • glimepiride 2 MG Oral Tab Take 1 tablet (2 mg total) by mouth every morning before breakfast. 90 t Reported on 2/24/2021 ) 3 Bottle 1     Allergies:  Pcn [Penicillins]       RASH    HISTORY:  Past Medical History:   Diagnosis Date   • Diabetes ([facility] [address]    • Elevated lipids     Hyperlipidemia      Past Surgical History:   Procedure Laterality Date   • COLON She is alert. Skin: No rash noted. She is not diaphoretic. No erythema. Nursing note and vitals reviewed.            ASSESSMENT/PLAN:   (E11.69) Type 2 diabetes mellitus with other specified complication, without long-term current use of insulin (HCC) Protein,Total,Urine, Random      Urine Microscopic w Reflex CULTURE      Meds This Visit:  Requested Prescriptions     Signed Prescriptions Disp Refills   • glimepiride 2 MG Oral Tab 90 tablet 1     Sig: Take 1 tablet (2 mg total) by mouth every morning be

## 2021-03-06 ENCOUNTER — IMMUNIZATION (OUTPATIENT)
Dept: LAB | Facility: HOSPITAL | Age: 66
End: 2021-03-06
Attending: HOSPITALIST
Payer: COMMERCIAL

## 2021-03-06 DIAGNOSIS — Z23 NEED FOR VACCINATION: Primary | ICD-10-CM

## 2021-03-06 PROCEDURE — 0011A SARSCOV2 VAC 100MCG/0.5ML IM: CPT

## 2021-03-24 ENCOUNTER — OFFICE VISIT (OUTPATIENT)
Dept: ENDOCRINOLOGY CLINIC | Facility: CLINIC | Age: 66
End: 2021-03-24
Payer: COMMERCIAL

## 2021-03-24 VITALS
BODY MASS INDEX: 31 KG/M2 | SYSTOLIC BLOOD PRESSURE: 150 MMHG | WEIGHT: 149 LBS | HEART RATE: 105 BPM | DIASTOLIC BLOOD PRESSURE: 80 MMHG

## 2021-03-24 DIAGNOSIS — E11.65 UNCONTROLLED TYPE 2 DIABETES MELLITUS WITH HYPERGLYCEMIA (HCC): Primary | ICD-10-CM

## 2021-03-24 LAB
GLUCOSE BLOOD: 334
TEST STRIP LOT #: NORMAL NUMERIC

## 2021-03-24 PROCEDURE — 36416 COLLJ CAPILLARY BLOOD SPEC: CPT | Performed by: NURSE PRACTITIONER

## 2021-03-24 PROCEDURE — 99204 OFFICE O/P NEW MOD 45 MIN: CPT | Performed by: NURSE PRACTITIONER

## 2021-03-24 PROCEDURE — 82947 ASSAY GLUCOSE BLOOD QUANT: CPT | Performed by: NURSE PRACTITIONER

## 2021-03-24 NOTE — PROGRESS NOTES
Name: Jerzy Haq  Date: 3/24/2021    Referring Physician: No ref. provider found    CHIEF COMPLAINT   No chief complaint on file.       HISTORY OF PRESENT ILLNESS   Jerzy Haq is a 76year old female who presents for new consultation on diabetes m weekly     -1 slice of italian bread per day   Recently stopped eating sweets - about 40 days now   Admits to drinking OJ (1x weekly) or cranberry juice (daily)  Fruits: apple, banana, kiwi, or Grapefruit     EXERCISE:   Yes - walks 2 miles daily around 11 3350-KCl-[id number]-NaCl-NaSulf (PEG 3350/ELECTROLYTES) 240 g Oral Recon Soln, Take as directed according to colonoscopy instructions (Patient not taking: Reported on 2/24/2021 ), Disp: 1 Bottle, Rfl: 0  •  Lancets Thin Does not apply Misc, Check blood sugar tw Other Topics      Concerns:        Caffeine Concern: Yes          2 cups tea/coffee daily        Pt has a pacemaker: No        Pt has a defibrillator: No        Reaction to local anesthetic: No    Social Determinants of Health  Financial Resource Strain: moisture and skin texture  Psychiatric:  oriented to time, self, and place  Nutritional:  no abnormal weight gain or loss    LABS: Pertinent labs reviewed    ASSESSMENT/PLAN:    -Reviewed with patient the pathogenesis of diabetes, clinical significance of target a weight loss of 7% and increase exercise to at least 150min a week.     2. Hypertension   -on amlodipine 10mg once daily- verbalizes compliance   -Well controlled normally, however above goal in clinic today.   -discussed to decrease salt/sodium int

## 2021-03-24 NOTE — PATIENT INSTRUCTIONS
A1C: 9.3% on 2/25/2021  Blood glucose: 334 in clinic today       Medications:     - increase Glimepiride 2-->4mg once daily in AM  -continue with Metformin 1,000mg with breakfast      1,000mg with dinner     -lets try to be cautious with low carbohydrate d

## 2021-04-03 ENCOUNTER — IMMUNIZATION (OUTPATIENT)
Dept: LAB | Facility: HOSPITAL | Age: 66
End: 2021-04-03
Attending: EMERGENCY MEDICINE
Payer: COMMERCIAL

## 2021-04-03 DIAGNOSIS — Z23 NEED FOR VACCINATION: Primary | ICD-10-CM

## 2021-04-03 PROCEDURE — 0012A SARSCOV2 VAC 100MCG/0.5ML IM: CPT

## 2021-04-07 ENCOUNTER — TELEPHONE (OUTPATIENT)
Dept: ENDOCRINOLOGY CLINIC | Facility: CLINIC | Age: 66
End: 2021-04-07

## 2021-04-07 NOTE — TELEPHONE ENCOUNTER
MURIELN's patient giving an update on BG after LOV 3/24/21     3/25  Fasting 162, before lunch: 115, 169 after dinner    3/26  Fasting 176, 2 hrs after breakfast 189    3/27: Fasting 189    3/28: Fastin    3/29: Fastin    3/30: Fastin, 118 ev

## 2021-04-07 NOTE — TELEPHONE ENCOUNTER
Patient called in to give an update on blood sugar levels. I advised Megha Stratton is on leave .  Patient called to update on morning and night from March 2nd,  3/2/21 162 am, 115 pm, march 3rd 169 march 4th 176, March 5th 140, march 6th 120, 101 march 7th

## 2021-04-08 RX ORDER — GLIMEPIRIDE 2 MG/1
4 TABLET ORAL
Qty: 180 TABLET | Refills: 0 | Status: SHIPPED | OUTPATIENT
Start: 2021-04-08 | End: 2021-08-09

## 2021-04-08 RX ORDER — BLOOD SUGAR DIAGNOSTIC
STRIP MISCELLANEOUS
Qty: 200 STRIP | Refills: 1 | Status: SHIPPED | OUTPATIENT
Start: 2021-04-08 | End: 2021-04-14

## 2021-04-08 NOTE — TELEPHONE ENCOUNTER
Spoke to patient and relayed below recommendation as outlined for which she voiced understanding. She is requesting for refill on test strips. RN refilled strips per protocol and sent script for glimepiride to update them of new dose.

## 2021-04-08 NOTE — TELEPHONE ENCOUNTER
Hi!  Please let her know that it is fine for her to try the glimepiride 4mg first and then to send us her blood sugars in a week. Thank you!

## 2021-04-13 NOTE — TELEPHONE ENCOUNTER
Pharmacy refill request for      •  Glucose Blood (ACCU-CHEK BEATRICE PLUS) In Vitro Strip, Check blood sugar twice daily, Disp: 200 strip, Rfl: 1    MESSAGE:  Please send new Rx with ICD code to bill medicare part B. Please follow up, thank you.

## 2021-04-14 RX ORDER — BLOOD SUGAR DIAGNOSTIC
STRIP MISCELLANEOUS
Qty: 200 STRIP | Refills: 0 | Status: SHIPPED | OUTPATIENT
Start: 2021-04-14 | End: 2021-08-09

## 2021-05-05 ENCOUNTER — LAB ENCOUNTER (OUTPATIENT)
Dept: LAB | Age: 66
End: 2021-05-05
Attending: INTERNAL MEDICINE
Payer: COMMERCIAL

## 2021-05-05 DIAGNOSIS — Z01.818 PRE-OP TESTING: ICD-10-CM

## 2021-05-07 ENCOUNTER — TELEPHONE (OUTPATIENT)
Dept: GASTROENTEROLOGY | Facility: CLINIC | Age: 66
End: 2021-05-07

## 2021-05-07 DIAGNOSIS — Z86.010 PERSONAL HISTORY OF COLONIC POLYPS: Primary | ICD-10-CM

## 2021-05-07 NOTE — TELEPHONE ENCOUNTER
Rescheduled for:  Colonoscopy - 43055  Provider Name:  Dr. Scot Grimm  Date:  FROM - 5/8/21                       TO - 8/10/21  Location:  [facility]  Sedation:  IV  Time:  FROM - 10:00 am                     TO - 12:30 pm (pt is aware to arrive at 11:30 am)  Prep:

## 2021-05-13 RX ORDER — MONTELUKAST SODIUM 10 MG/1
TABLET ORAL
Qty: 30 TABLET | Refills: 3 | Status: SHIPPED | OUTPATIENT
Start: 2021-05-13 | End: 2021-08-09

## 2021-08-09 ENCOUNTER — TELEPHONE (OUTPATIENT)
Dept: FAMILY MEDICINE CLINIC | Facility: CLINIC | Age: 66
End: 2021-08-09

## 2021-08-09 ENCOUNTER — TELEPHONE (OUTPATIENT)
Dept: GASTROENTEROLOGY | Facility: CLINIC | Age: 66
End: 2021-08-09

## 2021-08-09 NOTE — TELEPHONE ENCOUNTER
LOV 3/24/21 w/ APN RTC 3 mos  No F/U (no mychart) note added to script to call clinic to book apt for future refills

## 2021-08-09 NOTE — TELEPHONE ENCOUNTER
I called the pt back    She was asking if she could eat something. I advised her she can only have clear liquids up to 3 hours prior to her procedure.     She can have anything on the clear liquid diet list. No jello or Gatorade if it is red,blue or purp

## 2021-08-09 NOTE — TELEPHONE ENCOUNTER
Patient states she has an upcoming appointment with Dr. Katyh Gutiérrez on 8/25.     Patient requesting following medications:    Montelukast 10 mg  Metformin  mg  Amlodipine 10 mg  Glimepiride 2 mg - informed patient this was discontinued according to chart

## 2021-08-10 ENCOUNTER — HOSPITAL ENCOUNTER (OUTPATIENT)
Facility: HOSPITAL | Age: 66
Setting detail: HOSPITAL OUTPATIENT SURGERY
Discharge: HOME | End: 2021-08-10
Attending: INTERNAL MEDICINE | Admitting: INTERNAL MEDICINE
Payer: COMMERCIAL

## 2021-08-10 VITALS
HEART RATE: 86 BPM | DIASTOLIC BLOOD PRESSURE: 72 MMHG | RESPIRATION RATE: 18 BRPM | SYSTOLIC BLOOD PRESSURE: 130 MMHG | BODY MASS INDEX: 31.07 KG/M2 | HEIGHT: 58 IN | OXYGEN SATURATION: 99 % | WEIGHT: 148 LBS

## 2021-08-10 DIAGNOSIS — Z01.818 PRE-OP TESTING: Primary | ICD-10-CM

## 2021-08-10 DIAGNOSIS — Z86.010 H/O ADENOMATOUS POLYP OF COLON: ICD-10-CM

## 2021-08-10 LAB — GLUCOSE BLDC GLUCOMTR-MCNC: 175 MG/DL (ref 70–99)

## 2021-08-10 PROCEDURE — G0105 COLORECTAL SCRN; HI RISK IND: HCPCS | Performed by: INTERNAL MEDICINE

## 2021-08-10 PROCEDURE — 0DJD8ZZ INSPECTION OF LOWER INTESTINAL TRACT, VIA NATURAL OR ARTIFICIAL OPENING ENDOSCOPIC: ICD-10-PCS | Performed by: INTERNAL MEDICINE

## 2021-08-10 PROCEDURE — G0500 MOD SEDAT ENDO SERVICE >5YRS: HCPCS | Performed by: INTERNAL MEDICINE

## 2021-08-10 RX ORDER — SODIUM CHLORIDE, SODIUM LACTATE, POTASSIUM CHLORIDE, CALCIUM CHLORIDE 600; 310; 30; 20 MG/100ML; MG/100ML; MG/100ML; MG/100ML
INJECTION, SOLUTION INTRAVENOUS CONTINUOUS
Status: DISCONTINUED | OUTPATIENT
Start: 2021-08-10 | End: 2021-08-10

## 2021-08-10 RX ORDER — SODIUM CHLORIDE 0.9 % (FLUSH) 0.9 %
10 SYRINGE (ML) INJECTION AS NEEDED
Status: DISCONTINUED | OUTPATIENT
Start: 2021-08-10 | End: 2021-08-10

## 2021-08-10 RX ORDER — GLIMEPIRIDE 2 MG/1
TABLET ORAL
Qty: 180 TABLET | Refills: 0 | Status: SHIPPED | OUTPATIENT
Start: 2021-08-10 | End: 2021-11-15

## 2021-08-10 RX ORDER — MIDAZOLAM HYDROCHLORIDE 1 MG/ML
INJECTION INTRAMUSCULAR; INTRAVENOUS
Status: DISCONTINUED | OUTPATIENT
Start: 2021-08-10 | End: 2021-08-10

## 2021-08-10 RX ORDER — MIDAZOLAM HYDROCHLORIDE 1 MG/ML
1 INJECTION INTRAMUSCULAR; INTRAVENOUS EVERY 5 MIN PRN
Status: DISCONTINUED | OUTPATIENT
Start: 2021-08-10 | End: 2021-08-10

## 2021-08-10 NOTE — H&P
History & Physical Examination    Patient Name: Ruth Nolan  MRN: [id number]  [facility]: [id number]  YOB: 1955    Diagnosis: Personal history colon polyp    Present Illness: 76-year-old woman with BMI 31, history diabetes dyslipidemia returns Disp: 200 each, Rfl: 2  Blood Glucose Monitoring Suppl (ACCU-CHEK BEATRICE PLUS) w/Device Does not apply Kit, Check blood sugar twice daily, Disp: 1 kit, Rfl: 0  Fluticasone Propionate 50 MCG/ACT Nasal Suspension, 1 spray by Nasal route 2 (two) times daily.  ( Never Smoker      Smokeless tobacco: Never Used    Alcohol use:  Yes      Alcohol/week: 0.0 standard drinks      Comment: 1-2 glasses yearly      SYSTEM Check if Review is Normal Check if Physical Exam is Normal If not normal, please explain:   MONAE [ ] Hira Comp

## 2021-08-10 NOTE — OPERATIVE REPORT
COLONOSCOPY PROCEDURE REPORT     DATE OF PROCEDURE:  8/10/2021     PCP: Yvon Babin MD     PREOPERATIVE DIAGNOSIS: Personal history colon polyp     POSTOPERATIVE DIAGNOSIS:  See impression. SURGEON:  DALI Burns

## 2021-09-01 ENCOUNTER — HOSPITAL ENCOUNTER (OUTPATIENT)
Dept: MAMMOGRAPHY | Age: 66
Discharge: HOME | End: 2021-09-01
Attending: INTERNAL MEDICINE
Payer: COMMERCIAL

## 2021-09-01 ENCOUNTER — HOSPITAL ENCOUNTER (OUTPATIENT)
Dept: BONE DENSITY | Age: 66
Discharge: HOME | End: 2021-09-01
Attending: INTERNAL MEDICINE
Payer: COMMERCIAL

## 2021-09-01 DIAGNOSIS — Z78.0 POSTMENOPAUSAL: ICD-10-CM

## 2021-09-01 DIAGNOSIS — Z12.31 VISIT FOR SCREENING MAMMOGRAM: ICD-10-CM

## 2021-09-01 PROCEDURE — 77063 BREAST TOMOSYNTHESIS BI: CPT | Performed by: INTERNAL MEDICINE

## 2021-09-01 PROCEDURE — 77080 DXA BONE DENSITY AXIAL: CPT | Performed by: INTERNAL MEDICINE

## 2021-09-01 PROCEDURE — 77067 SCR MAMMO BI INCL CAD: CPT | Performed by: INTERNAL MEDICINE

## 2021-09-02 ENCOUNTER — TELEPHONE (OUTPATIENT)
Dept: INTERNAL MEDICINE CLINIC | Facility: CLINIC | Age: 66
End: 2021-09-02

## 2021-09-02 NOTE — TELEPHONE ENCOUNTER
Spoke with pt,  verified  Pt informed of dexascan result & MD recommendation, pt stated understanding. Pt is also looking for mammogram result, pls advise, thanks in advance. Pt requested to send copies of both test results when completed.

## 2021-09-03 NOTE — TELEPHONE ENCOUNTER
Patient contacted (Name and  of pt verified). All results and recommendations reviewed. Patient verbalizes understanding. Triage support: can you please send a hardcopy of mammogram and dexa reports. Patient does not use [employer].

## 2021-09-03 NOTE — TELEPHONE ENCOUNTER
Impression   CONCLUSION:  The parenchyma pattern is stable with no new suspicious asymmetry, mass, architectural distortion, or microcalcifications identified in either breast.         BI-RADS CATEGORY:     DIAGNOSTIC CATEGORY 1--NEGATIVE ASSESSMENT.

## 2021-09-20 ENCOUNTER — OFFICE VISIT (OUTPATIENT)
Dept: INTERNAL MEDICINE CLINIC | Facility: CLINIC | Age: 66
End: 2021-09-20
Payer: COMMERCIAL

## 2021-09-20 VITALS
HEART RATE: 92 BPM | SYSTOLIC BLOOD PRESSURE: 128 MMHG | WEIGHT: 150 LBS | HEIGHT: 58.5 IN | BODY MASS INDEX: 30.65 KG/M2 | DIASTOLIC BLOOD PRESSURE: 80 MMHG

## 2021-09-20 DIAGNOSIS — E78.5 HYPERLIPIDEMIA, UNSPECIFIED HYPERLIPIDEMIA TYPE: ICD-10-CM

## 2021-09-20 DIAGNOSIS — I10 ESSENTIAL HYPERTENSION: ICD-10-CM

## 2021-09-20 DIAGNOSIS — Z00.00 PHYSICAL EXAM: Primary | ICD-10-CM

## 2021-09-20 DIAGNOSIS — E11.69 TYPE 2 DIABETES MELLITUS WITH OTHER SPECIFIED COMPLICATION, WITHOUT LONG-TERM CURRENT USE OF INSULIN (HCC): ICD-10-CM

## 2021-09-20 PROCEDURE — 99397 PER PM REEVAL EST PAT 65+ YR: CPT | Performed by: INTERNAL MEDICINE

## 2021-09-20 PROCEDURE — 3074F SYST BP LT 130 MM HG: CPT | Performed by: INTERNAL MEDICINE

## 2021-09-20 PROCEDURE — 90471 IMMUNIZATION ADMIN: CPT | Performed by: INTERNAL MEDICINE

## 2021-09-20 PROCEDURE — 3079F DIAST BP 80-89 MM HG: CPT | Performed by: INTERNAL MEDICINE

## 2021-09-20 PROCEDURE — 90662 IIV NO PRSV INCREASED AG IM: CPT | Performed by: INTERNAL MEDICINE

## 2021-09-20 PROCEDURE — 3008F BODY MASS INDEX DOCD: CPT | Performed by: INTERNAL MEDICINE

## 2021-09-21 NOTE — PROGRESS NOTES
HPI:    Patient ID: Yadi Eason is a 76year old female. HPIPhysical exam  generally healthy    Normal bone density   Normal mammogram  Due for colonoscopy and blood work    /80   Pulse 92   Ht 4' 10.5\" (1.486 m)   Wt 150 lb (68 kg)   BMI 30. by mouth nightly. 90 tablet 3   • Glucose Blood (ACCU-CHEK BEATRICE PLUS) In Vitro Strip Check blood sugar twice daily 200 strip 0   • amLODIPine besylate 10 MG Oral Tab Take 1 tablet (10 mg total) by mouth daily.  90 tablet 1   • rosuvastatin 10 MG Oral Tab T Disorder Brother 46        Congestive Heart Failure      Social History: Social History    Tobacco Use      Smoking status: Never Smoker      Smokeless tobacco: Never Used    Vaping Use      Vaping Use: Never used    Alcohol use:  Yes      Alcohol/week: 0.0 A1C, LIPID PANEL, ASSAY, THYROID STIM HORMONE,         URINALYSIS WITH CULTURE REFLEX        Generally healthy  Body mass index is 30.82 kg/m².   Wt loss adivsed      (E11.69) Type 2 diabetes mellitus with other specified complication, without long-

## 2021-11-15 RX ORDER — GLIMEPIRIDE 2 MG/1
TABLET ORAL
Qty: 180 TABLET | Refills: 0 | Status: SHIPPED | OUTPATIENT
Start: 2021-11-15 | End: 2022-02-04

## 2021-12-07 ENCOUNTER — LAB ENCOUNTER (OUTPATIENT)
Dept: LAB | Age: 66
End: 2021-12-07
Attending: INTERNAL MEDICINE
Payer: COMMERCIAL

## 2021-12-07 DIAGNOSIS — Z00.00 PHYSICAL EXAM: Primary | ICD-10-CM

## 2021-12-07 PROCEDURE — 84443 ASSAY THYROID STIM HORMONE: CPT | Performed by: INTERNAL MEDICINE

## 2021-12-07 PROCEDURE — 84460 ALANINE AMINO (ALT) (SGPT): CPT

## 2021-12-07 PROCEDURE — 81001 URINALYSIS AUTO W/SCOPE: CPT | Performed by: INTERNAL MEDICINE

## 2021-12-07 PROCEDURE — 87086 URINE CULTURE/COLONY COUNT: CPT | Performed by: INTERNAL MEDICINE

## 2021-12-07 PROCEDURE — 3052F HG A1C>EQUAL 8.0%<EQUAL 9.0%: CPT | Performed by: NURSE PRACTITIONER

## 2021-12-07 PROCEDURE — 84450 TRANSFERASE (AST) (SGOT): CPT

## 2021-12-07 PROCEDURE — 36415 COLL VENOUS BLD VENIPUNCTURE: CPT | Performed by: INTERNAL MEDICINE

## 2021-12-07 PROCEDURE — 80048 BASIC METABOLIC PNL TOTAL CA: CPT | Performed by: INTERNAL MEDICINE

## 2021-12-07 PROCEDURE — 83036 HEMOGLOBIN GLYCOSYLATED A1C: CPT | Performed by: INTERNAL MEDICINE

## 2021-12-07 PROCEDURE — 80061 LIPID PANEL: CPT

## 2021-12-08 ENCOUNTER — OFFICE VISIT (OUTPATIENT)
Dept: ENDOCRINOLOGY CLINIC | Facility: CLINIC | Age: 66
End: 2021-12-08
Payer: COMMERCIAL

## 2021-12-08 VITALS
HEART RATE: 85 BPM | DIASTOLIC BLOOD PRESSURE: 80 MMHG | SYSTOLIC BLOOD PRESSURE: 151 MMHG | WEIGHT: 148 LBS | BODY MASS INDEX: 30 KG/M2

## 2021-12-08 DIAGNOSIS — E11.65 UNCONTROLLED TYPE 2 DIABETES MELLITUS WITH HYPERGLYCEMIA (HCC): Primary | ICD-10-CM

## 2021-12-08 PROCEDURE — 36416 COLLJ CAPILLARY BLOOD SPEC: CPT | Performed by: NURSE PRACTITIONER

## 2021-12-08 PROCEDURE — 36415 COLL VENOUS BLD VENIPUNCTURE: CPT

## 2021-12-08 PROCEDURE — 3077F SYST BP >= 140 MM HG: CPT | Performed by: NURSE PRACTITIONER

## 2021-12-08 PROCEDURE — 3079F DIAST BP 80-89 MM HG: CPT | Performed by: NURSE PRACTITIONER

## 2021-12-08 PROCEDURE — 82947 ASSAY GLUCOSE BLOOD QUANT: CPT | Performed by: NURSE PRACTITIONER

## 2021-12-08 PROCEDURE — 99214 OFFICE O/P EST MOD 30 MIN: CPT | Performed by: NURSE PRACTITIONER

## 2021-12-08 PROCEDURE — 80061 LIPID PANEL: CPT

## 2021-12-08 RX ORDER — DULAGLUTIDE 0.75 MG/.5ML
0.75 INJECTION, SOLUTION SUBCUTANEOUS WEEKLY
Qty: 6 ML | Refills: 0 | Status: SHIPPED | OUTPATIENT
Start: 2021-12-08 | End: 2022-02-04

## 2021-12-08 NOTE — PROGRESS NOTES
Name: Marcelino Armando  Date: 12/8/2021    CHIEF COMPLAINT   No chief complaint on file. HISTORY OF PRESENT ILLNESS   Marcelino Armando is a 78year old female who presents for follow up on diabetes management. Hgb A1C: 8.9% on 12/7/2021.  This is a dec Negative for:  dysphagia, neck swelling, dysphonia  Respiratory: Negative for: hemoptysis, shortness of breath, cough, or dyspnea.   Cardiovascular: Negative for:  chest pain, chest discomfort, palpitations  GI: Negative for:  abdominal pain, nausea, vomiti 2 (two) times daily. , Disp: 15 g, Rfl: 3  •  Glucose Blood In Vitro Strip, USE WITH GLUCOMETER TWICE A DAY Pt would like contour test strips (Patient not taking: Reported on 9/20/2021), Disp: 100 strip, Rfl: 5  •  OneTouch Delica Lancets 98V Does not apply loss  Head: Atraumatic  Eyes:  normal conjunctivae, sclera. , normal sclera and normal pupils  Throat/Neck: normal sound to voice. Normal hearing, normal speech  Back: no kyphosis  Respiratory:  Speaking in full sentences, non-labored.  no increased work of 71.0 on 1/2017   c) UTD with optho   D) foot exam: normal today 12/8/2021  d) start testing BG 2x daily- alternate with fasting and before dinner or 2hrs after dinner   e) Life style changes: Diet: low carbohydrate diet 30-45gm per meal discussed, Exercise

## 2021-12-08 NOTE — PATIENT INSTRUCTIONS
A1C: 8.9% on 12/7/2021 --> decreased from 9.3% on 2/25/2021  Blood glucose: 239 in clinic today    Medications:   - continue with Glimepiride 2mg once daily in AM around 5-6am  -continue with Metformin 1,000mg with breakfast      1,000mg with dinner  -star

## 2021-12-16 ENCOUNTER — TELEPHONE (OUTPATIENT)
Dept: ENDOCRINOLOGY CLINIC | Facility: CLINIC | Age: 66
End: 2021-12-16

## 2021-12-16 NOTE — TELEPHONE ENCOUNTER
Received fax from [facility]     Detailed report of eye exam from 12/14/2021. Sent to scanning.

## 2021-12-29 ENCOUNTER — NURSE TRIAGE (OUTPATIENT)
Dept: INTERNAL MEDICINE CLINIC | Facility: CLINIC | Age: 66
End: 2021-12-29

## 2021-12-29 NOTE — TELEPHONE ENCOUNTER
I ADVISE VIDEO VISIT WITH AVAILABLE PROVIDER OR URGENT CARE  FOR PROPER EVAL AND TREATMENT  DUE TO COVID PANDEMIC   NEED FURTHER EVAL BY PROVIDER

## 2021-12-29 NOTE — TELEPHONE ENCOUNTER
Action Requested: Summary for Provider     []  Critical Lab, Recommendations Needed  [] Need Additional Advice  []   FYI    []   Need Orders  [x] Need Medications Sent to Pharmacy  []  Other     SUMMARY: Patient stated for the past 1 week has been having

## 2021-12-29 NOTE — TELEPHONE ENCOUNTER
Spoke to patient and relayed Dr. Earlene Goodwin message below. Patient verbalized understanding and had no further questions. She asked writer to call her back in an hour. Relayed that she could call to schedule appointment in the morning.  She verbalized unders

## 2022-01-07 ENCOUNTER — OFFICE VISIT (OUTPATIENT)
Dept: OTOLARYNGOLOGY | Facility: CLINIC | Age: 67
End: 2022-01-07
Payer: COMMERCIAL

## 2022-01-07 VITALS — HEIGHT: 58 IN | WEIGHT: 148 LBS | BODY MASS INDEX: 31.07 KG/M2

## 2022-01-07 DIAGNOSIS — H93.8X1 PRESSURE SENSATION IN RIGHT EAR: Primary | ICD-10-CM

## 2022-01-07 PROCEDURE — 3008F BODY MASS INDEX DOCD: CPT | Performed by: OTOLARYNGOLOGY

## 2022-01-07 PROCEDURE — 99213 OFFICE O/P EST LOW 20 MIN: CPT | Performed by: OTOLARYNGOLOGY

## 2022-01-07 NOTE — PROGRESS NOTES
Almaz Beavers is a 78year old female. Patient presents with:  Ear Problem: Pt presents with right ear pain , pt hears a pulse sensation       HISTORY OF PRESENT ILLNESS  2015  I have seen her in the past for pharyngitis.   Recently she has been complain Cause of Death   • Pulmonary Disease Father 68        COPD Cause of Death   • Heart Disorder Brother 46        Congestive Heart Failure       Past Medical History:   Diagnosis Date   • Diabetes ([facility] [address]    • Elevated lipids     Hyperlipidemia   • High blood p Normal. Tonsils - Normal. Oropharynx - Normal.   Ears Normal Inspection - Right: Normal, Left: Normal. Canal - Right: Normal, Left: Normal. TM - Right: Normal, Left: Normal.   Skin Normal Inspection - Normal.        Lymph Detail Normal Submental. Submandib each, Rfl: 5  •  CALCIUM OR, Take by mouth., Disp: , Rfl:   •  aspirin 81 MG Oral Tab, Take 81 mg by mouth daily. , Disp: , Rfl:   •  Multiple Vitamins-Minerals (WOMENS MULTIVITAMIN PLUS) Oral Tab, Take  by mouth., Disp: , Rfl:   •  Fluticasone Propionate 5

## 2022-01-08 ENCOUNTER — TELEPHONE (OUTPATIENT)
Dept: OTOLARYNGOLOGY | Facility: CLINIC | Age: 67
End: 2022-01-08

## 2022-01-08 RX ORDER — MONTELUKAST SODIUM 10 MG/1
10 TABLET ORAL NIGHTLY
Qty: 30 TABLET | Refills: 3 | Status: SHIPPED | OUTPATIENT
Start: 2022-01-08

## 2022-01-08 RX ORDER — LORATADINE 10 MG/1
10 TABLET ORAL DAILY
Qty: 30 TABLET | Refills: 3 | Status: SHIPPED | OUTPATIENT
Start: 2022-01-08

## 2022-01-08 RX ORDER — AZELASTINE 1 MG/ML
2 SPRAY, METERED NASAL 2 TIMES DAILY
Qty: 30 ML | Refills: 0 | Status: SHIPPED | OUTPATIENT
Start: 2022-01-08

## 2022-02-03 RX ORDER — ROSUVASTATIN CALCIUM 10 MG/1
10 TABLET, COATED ORAL EVERY EVENING
Qty: 90 TABLET | Refills: 1 | Status: SHIPPED | OUTPATIENT
Start: 2022-02-03

## 2022-02-03 NOTE — TELEPHONE ENCOUNTER
Refill passed per [address] protocol.     Requested Prescriptions   Pending Prescriptions Disp Refills    ROSUVASTATIN 10 MG Oral Tab [Pharmacy Med Name: Rosuvastatin Calcium 10 MG Oral Tablet] 90 tablet 0     Sig: TAKE 1 TABLET BY MOUTH ONCE DAILY IN THE EVENING        Cholesterol Medication Protocol Passed - 2/3/2022 12:55 PM        Passed - ALT in past 12 months        Passed - LDL in past 12 months        Passed - Last ALT < 80       Lab Results   Component Value Date    ALT 27 12/07/2021             Passed - Last LDL < 130     Lab Results   Component Value Date    LDL 66 12/07/2021               Passed - Appointment in past 12 or next 3 months              Recent Outpatient Visits              3 weeks ago Pressure sensation in right ear    [facility], [address], Allie Christie MD    Office Visit    1 month ago Uncontrolled type 2 diabetes mellitus with hyperglycemia [facility])    [address], [address] [facility], [address] [facility]    Office Visit    4 months ago Physical exam    [address], Anne Anand MD    Office Visit    10 months ago Uncontrolled type 2 diabetes mellitus with hyperglycemia [facility])    [address] Sl[address], [address], [address] [facility], [address] [facility]    Office Visit    11 months ago Type 2 diabetes mellitus with other specified complication, without long-term current use of insulin [facility])    [address], Anne Anand MD    Office Visit            Future Appointments         Provider Department Appt Notes    In 6 months Guera Mcdermott MD [facility], [address], [place] 1 YEAR FOLLOW UP EARS        pan

## 2022-02-04 RX ORDER — DULAGLUTIDE 0.75 MG/.5ML
INJECTION, SOLUTION SUBCUTANEOUS
Qty: 6 ML | Refills: 0 | Status: SHIPPED | OUTPATIENT
Start: 2022-02-04 | End: 2022-02-28

## 2022-02-04 RX ORDER — GLIMEPIRIDE 2 MG/1
TABLET ORAL
Qty: 180 TABLET | Refills: 0 | Status: SHIPPED | OUTPATIENT
Start: 2022-02-04

## 2022-02-28 ENCOUNTER — OFFICE VISIT (OUTPATIENT)
Dept: ENDOCRINOLOGY CLINIC | Facility: CLINIC | Age: 67
End: 2022-02-28
Payer: COMMERCIAL

## 2022-02-28 VITALS
DIASTOLIC BLOOD PRESSURE: 76 MMHG | HEART RATE: 86 BPM | SYSTOLIC BLOOD PRESSURE: 149 MMHG | BODY MASS INDEX: 31 KG/M2 | WEIGHT: 149 LBS

## 2022-02-28 DIAGNOSIS — R23.8 SKIN IRRITATION: ICD-10-CM

## 2022-02-28 DIAGNOSIS — E11.65 TYPE 2 DIABETES MELLITUS WITH HYPERGLYCEMIA, WITHOUT LONG-TERM CURRENT USE OF INSULIN (HCC): Primary | ICD-10-CM

## 2022-02-28 LAB
CARTRIDGE LOT#: ABNORMAL NUMERIC
GLUCOSE BLOOD: 197
HEMOGLOBIN A1C: 7.8 % (ref 4.3–5.6)
TEST STRIP LOT #: NORMAL NUMERIC

## 2022-02-28 PROCEDURE — 3078F DIAST BP <80 MM HG: CPT | Performed by: NURSE PRACTITIONER

## 2022-02-28 PROCEDURE — 82947 ASSAY GLUCOSE BLOOD QUANT: CPT | Performed by: NURSE PRACTITIONER

## 2022-02-28 PROCEDURE — 99214 OFFICE O/P EST MOD 30 MIN: CPT | Performed by: NURSE PRACTITIONER

## 2022-02-28 PROCEDURE — 3077F SYST BP >= 140 MM HG: CPT | Performed by: NURSE PRACTITIONER

## 2022-02-28 PROCEDURE — 36416 COLLJ CAPILLARY BLOOD SPEC: CPT | Performed by: NURSE PRACTITIONER

## 2022-02-28 PROCEDURE — 3051F HG A1C>EQUAL 7.0%<8.0%: CPT | Performed by: NURSE PRACTITIONER

## 2022-02-28 PROCEDURE — 83036 HEMOGLOBIN GLYCOSYLATED A1C: CPT | Performed by: NURSE PRACTITIONER

## 2022-02-28 RX ORDER — DULAGLUTIDE 0.75 MG/.5ML
0.75 INJECTION, SOLUTION SUBCUTANEOUS WEEKLY
Qty: 6 ML | Refills: 0 | Status: SHIPPED | OUTPATIENT
Start: 2022-02-28

## 2022-02-28 NOTE — PATIENT INSTRUCTIONS
A1C: 7.9% today -->decreased from 8.9% on 12/7/2021  Blood glucose: 197 in clinic today    Medications:   - continue with Glimepiride 2mg once daily in AM around 5-6am  -continue with Metformin 1,000mg with breakfast      1,000mg with dinner  -continue with Trulicity 1.36QV once weekly on Mondays     Weight loss goal:  Wt Readings from Last 6 Encounters:  02/28/22 : 149 lb (67.6 kg)  01/07/22 : 148 lb (67.1 kg)  12/08/21 : 148 lb (67.1 kg)  09/20/21 : 150 lb (68 kg)  08/10/21 : 148 lb (67.1 kg)  03/24/21 : 149 lb (67.6 kg)    A1C goal:  <7.5%    Blood sugar testing:  Test your blood sugar 1 time daily   Recommended times to test: alternate with before breakfast (fasting) or 2hrs after lunch or 2hrs after dinner     Blood sugar targets:  Before breakfast:   (preferably < 110)  Before meals OR 2 hours after meals: <180 (preferably <150)     Call for persistent blood sugars < 75 or > 200.

## 2022-04-18 RX ORDER — AZELASTINE HYDROCHLORIDE 137 UG/1
SPRAY, METERED NASAL
Qty: 30 ML | Refills: 9 | Status: SHIPPED | OUTPATIENT
Start: 2022-04-18

## 2022-05-16 NOTE — TELEPHONE ENCOUNTER
Please review. Protocol failed / No protocol.    Requested Prescriptions   Pending Prescriptions Disp Refills    METFORMIN 500 MG Oral Tab [Pharmacy Med Name: metFORMIN HCl 500 MG Oral Tablet] 360 tablet 0     Sig: Take 2 tablets by mouth twice daily        Diabetes Medication Protocol Failed - 5/16/2022 10:42 AM        Failed - Last A1C < 7.5 and within past 6 months     Lab Results   Component Value Date    A1C 7.8 (A) 02/28/2022               Failed - Appointment in past 6 or next 3 months        Passed - GFR Non- > 50     Lab Results   Component Value Date    GFRNAA 74 12/07/2021                 Passed - GFR in the past 12 months             Future Appointments         Provider Department Appt Notes    In 4 months Michelle Anderson MD [place] [facility] [employer], [address], [place] physical / PAP   last px 3/78/7186  (policy informed)    In 7 months Jason Driscoll MD [facility], [place]           Recent Outpatient Visits              2 months ago Type 2 diabetes mellitus with hyperglycemia, without long-term current use of insulin [facility])    [place] [facility] [facility], [address], [address] [facility]    Office Visit    4 months ago Pressure sensation in right ear    [facility], [place] Marilee Sparrow, MD    Office Visit    5 months ago Uncontrolled type 2 diabetes mellitus with hyperglycemia [facility])    [facility], [address], [address] [facility]    Office Visit    7 months ago Physical exam    Vishal King MD    Office Visit    1 year ago Uncontrolled type 2 diabetes mellitus with hyperglycemia [facility])    [facility], [address], [address] [facility], [facility] [address]    Office Visit

## 2022-05-17 RX ORDER — GLIMEPIRIDE 2 MG/1
TABLET ORAL
Qty: 180 TABLET | Refills: 0 | Status: SHIPPED | OUTPATIENT
Start: 2022-05-17

## 2022-05-17 NOTE — TELEPHONE ENCOUNTER
LOV: 2/28/22    RTC: 3 Months    FU: 6/1/22    Last Refill: 2/4/22    3 Month Supply Pending     Called to help schedule a fu appt for Osiris

## 2022-06-01 ENCOUNTER — OFFICE VISIT (OUTPATIENT)
Dept: ENDOCRINOLOGY CLINIC | Facility: CLINIC | Age: 67
End: 2022-06-01
Payer: COMMERCIAL

## 2022-06-01 VITALS
WEIGHT: 149.63 LBS | SYSTOLIC BLOOD PRESSURE: 136 MMHG | HEART RATE: 90 BPM | BODY MASS INDEX: 31 KG/M2 | DIASTOLIC BLOOD PRESSURE: 82 MMHG

## 2022-06-01 DIAGNOSIS — E11.65 UNCONTROLLED TYPE 2 DIABETES MELLITUS WITH HYPERGLYCEMIA (HCC): Primary | ICD-10-CM

## 2022-06-01 LAB
CARTRIDGE LOT#: ABNORMAL NUMERIC
GLUCOSE BLOOD: 311
HEMOGLOBIN A1C: 9.7 % (ref 4.3–5.6)
TEST STRIP LOT #: NORMAL NUMERIC

## 2022-06-01 PROCEDURE — 99214 OFFICE O/P EST MOD 30 MIN: CPT | Performed by: NURSE PRACTITIONER

## 2022-06-01 PROCEDURE — 3075F SYST BP GE 130 - 139MM HG: CPT | Performed by: NURSE PRACTITIONER

## 2022-06-01 PROCEDURE — 36416 COLLJ CAPILLARY BLOOD SPEC: CPT | Performed by: NURSE PRACTITIONER

## 2022-06-01 PROCEDURE — 82947 ASSAY GLUCOSE BLOOD QUANT: CPT | Performed by: NURSE PRACTITIONER

## 2022-06-01 PROCEDURE — 3079F DIAST BP 80-89 MM HG: CPT | Performed by: NURSE PRACTITIONER

## 2022-06-01 PROCEDURE — 83036 HEMOGLOBIN GLYCOSYLATED A1C: CPT | Performed by: NURSE PRACTITIONER

## 2022-06-01 RX ORDER — GLIMEPIRIDE 4 MG/1
4 TABLET ORAL
Qty: 90 TABLET | Refills: 0 | COMMUNITY
Start: 2022-06-01

## 2022-06-01 NOTE — PATIENT INSTRUCTIONS
A1C: 9.7% today -->incrased from 7.8% on 2/28/2022  Blood glucose: 311 in clinic today    Medications:   - increase Glimepiride 2-->4mg once daily in AM around 5-6am  -continue with Metformin 1,000mg with breakfast      1,000mg with dinner  -restart Trulicity 8.73VH once weekly    Schedule apt with Megan Trammell - Diabetes Educator to review low carb diet     -Avoid sweets and fruit juice or soda ----> may try zero sugar minute maid  -try two good yogurt   -switch to keto bread   -may try yasso frozen yogurt instead of regular ice cream     Weight:  Wt Readings from Last 6 Encounters:  06/01/22 : 149 lb 9.6 oz (67.9 kg)  02/28/22 : 149 lb (67.6 kg)  01/07/22 : 148 lb (67.1 kg)  12/08/21 : 148 lb (67.1 kg)  09/20/21 : 150 lb (68 kg)  08/10/21 : 148 lb (67.1 kg)    A1C goal:  <7.5%    Blood sugar testing:  Test your blood sugar 1 time daily   Recommended times to test: Before breakfast (fasting) or bedtime     Blood sugar targets:  Before breakfast:   (preferably < 110)  Before meals OR 2 hours after meals: <150    Call for persistent blood sugars < 75 or > 200

## 2022-06-06 ENCOUNTER — TELEPHONE (OUTPATIENT)
Dept: ENDOCRINOLOGY CLINIC | Facility: CLINIC | Age: 67
End: 2022-06-06

## 2022-06-06 NOTE — TELEPHONE ENCOUNTER
Pharmacy called to clarify cost of Trulicity. Per patient she was initially able to , however cost became high, thus she stopped using. Previous coupon was , thus high cost.     New coupon applicable, however patient needs to apply for new coupon online. Patient called, however she does not want to go back to trulicity due to not wanting to use injectable medications anymore. Verbalizes that she has been very cautious with low carb diet and walking daily at 45 mins. Patient agreeable to following up with me again in 4 weeks to review BG readings and A1C. Will hold off trulicity at this time.      Patient was assisted in scheduling apt with me on 22 at 10:15am.

## 2022-07-06 ENCOUNTER — OFFICE VISIT (OUTPATIENT)
Dept: ENDOCRINOLOGY CLINIC | Facility: CLINIC | Age: 67
End: 2022-07-06
Payer: COMMERCIAL

## 2022-07-06 VITALS
HEART RATE: 83 BPM | DIASTOLIC BLOOD PRESSURE: 84 MMHG | WEIGHT: 148.63 LBS | SYSTOLIC BLOOD PRESSURE: 157 MMHG | BODY MASS INDEX: 31 KG/M2

## 2022-07-06 DIAGNOSIS — E11.65 TYPE 2 DIABETES MELLITUS WITH HYPERGLYCEMIA, WITHOUT LONG-TERM CURRENT USE OF INSULIN (HCC): Primary | ICD-10-CM

## 2022-07-06 LAB
CARTRIDGE LOT#: ABNORMAL NUMERIC
GLUCOSE BLOOD: 195
HEMOGLOBIN A1C: 9.3 % (ref 4.3–5.6)
TEST STRIP LOT #: NORMAL NUMERIC

## 2022-07-06 PROCEDURE — 3079F DIAST BP 80-89 MM HG: CPT | Performed by: NURSE PRACTITIONER

## 2022-07-06 PROCEDURE — 83036 HEMOGLOBIN GLYCOSYLATED A1C: CPT | Performed by: NURSE PRACTITIONER

## 2022-07-06 PROCEDURE — 82947 ASSAY GLUCOSE BLOOD QUANT: CPT | Performed by: NURSE PRACTITIONER

## 2022-07-06 PROCEDURE — 3077F SYST BP >= 140 MM HG: CPT | Performed by: NURSE PRACTITIONER

## 2022-07-06 PROCEDURE — 99214 OFFICE O/P EST MOD 30 MIN: CPT | Performed by: NURSE PRACTITIONER

## 2022-07-06 PROCEDURE — 3046F HEMOGLOBIN A1C LEVEL >9.0%: CPT | Performed by: NURSE PRACTITIONER

## 2022-07-06 RX ORDER — GLIMEPIRIDE 2 MG/1
2 TABLET ORAL
Qty: 90 TABLET | Refills: 0 | Status: SHIPPED | OUTPATIENT
Start: 2022-07-06 | End: 2022-10-04

## 2022-07-06 NOTE — PATIENT INSTRUCTIONS
A1C: 9.3% today -->decreased from 9.7% on 6/1/2022  Blood glucose: 195 in clinic today    Medications:   -increase Glimepiride 4 mg once daily in AM with breakfast meal       2mg with dinner meal   -continue with Metformin 1,000mg with breakfast           1,000mg with dinner    Increase walking after dinner meal -- at least 30 mins   -continue to be careful with low carb diet     - avoid sweets     Please call and give me an update on your blood glucose readings in 10 days--Monday July 18    Weight:  Wt Readings from Last 6 Encounters:  07/06/22 : 148 lb 9.6 oz (67.4 kg)  06/01/22 : 149 lb 9.6 oz (67.9 kg)  02/28/22 : 149 lb (67.6 kg)  01/07/22 : 148 lb (67.1 kg)  12/08/21 : 148 lb (67.1 kg)  09/20/21 : 150 lb (68 kg)    A1C goal:   <7.5%    Blood sugar testing:  Test your blood sugar 1 time daily   Recommended times to test: alternate with before breakfast (fasting) or bedtime     Blood sugar targets:  Before breakfast:   (preferably < 110)  Before meals OR 2 hours after meals: <180 (preferably <150)     Call for persistent blood sugars < 75 or > 200

## 2022-07-19 RX ORDER — AMLODIPINE BESYLATE 10 MG/1
TABLET ORAL
Qty: 90 TABLET | Refills: 0 | Status: SHIPPED | OUTPATIENT
Start: 2022-07-19

## 2022-08-02 ENCOUNTER — TELEPHONE (OUTPATIENT)
Dept: ENDOCRINOLOGY CLINIC | Facility: CLINIC | Age: 67
End: 2022-08-02

## 2022-08-02 NOTE — TELEPHONE ENCOUNTER
Patient states she sees something posted to her MyChart from Endo office but cannot read it. Please call. Thank you.

## 2022-08-03 RX ORDER — GLIMEPIRIDE 2 MG/1
TABLET ORAL
Qty: 90 TABLET | Refills: 0 | Status: SHIPPED | OUTPATIENT
Start: 2022-08-03 | End: 2022-08-05

## 2022-08-05 RX ORDER — GLIMEPIRIDE 2 MG/1
TABLET ORAL
Qty: 270 TABLET | Refills: 0 | Status: SHIPPED | OUTPATIENT
Start: 2022-08-05

## 2022-08-05 RX ORDER — EMPAGLIFLOZIN 25 MG/1
25 TABLET, FILM COATED ORAL DAILY
Qty: 90 TABLET | Refills: 0 | Status: SHIPPED | OUTPATIENT
Start: 2022-08-05

## 2022-08-05 NOTE — TELEPHONE ENCOUNTER
The maximum dose of glimepiride is 8mg so she should not exceed this but also the efficacy is not much different between 4mg of glimepiride and the max dose of 8mg- meaning increasing the dose past what is prescribed will likely not bring the blood sugars down. She is on max dose of glimepiride and metformin. Linda Garcia had discussed adding an SGLT-2 medication at her last visit. I do think she needs a third medication based on blood sugars. I would recommend adding Jardiance 25mg PO daily. As a reminder she should stay well hydrated on this medication. As the medication helps the body get rid of excess sugar through urination, it may put her at an increased risk for yeast infection/UTI when first starting the medication so she should just let us know if she develops any urinary symptoms. Please let me know what she thinks and I can send the prescription.

## 2022-08-05 NOTE — TELEPHONE ENCOUNTER
Annia/ Delmis Clark to patient who stated blood sugars have been running high. She stated she is supposed to check twice a day, but is only checking once. 8/1 150 fasting in the morning,   8/2 160 fasting in the morning  8/3 190 fasting in the morning  8/4 170 fasting in the morning  8/5 200 fasting in the morning. Patient stated she is taking 8 mg of Glimepiride in the morning, and 1 tablet 2 mg in the evening. Patient stated she has been taking this wrong. Can initiate an override to see if we can get insurance to cover for more medication. Please advise.      Glimepiride 4 mg  in AM (30 mins before BF) 2mg in PM (30 mins before dinner)   Metformin 1,000mg with breakfast 1,000mg with dinner

## 2022-08-05 NOTE — TELEPHONE ENCOUNTER
Fransisco Ranks,    Patient is agreeable to starting Jardiance. Prescription pended.  Quantity limit override initiated for Glimepiride    Medication PA Requested:  GLIMEPIRIDE 2 MG Oral Tab                                                        CoverMyMeds Used:  Key:  Quantity: [id number]  Day Supply:  Si mg daily in the morning, 2 mg in the evening  DX Code: E11.65                                    CPT code (if applicable):   Case Number/Pending Ref#:

## 2022-08-08 NOTE — TELEPHONE ENCOUNTER
[id number] Shara cornelius. Thank you!      Please follow up on patient to see how her BG readings have been and how she feels on the medication    Thank you

## 2022-08-25 ENCOUNTER — LAB ENCOUNTER (OUTPATIENT)
Dept: LAB | Age: 67
End: 2022-08-25
Attending: NURSE PRACTITIONER
Payer: COMMERCIAL

## 2022-08-25 LAB
ALBUMIN SERPL-MCNC: 3.8 G/DL (ref 3.4–5)
ALBUMIN/GLOB SERPL: 1 {RATIO} (ref 1–2)
ALP LIVER SERPL-CCNC: 68 U/L
ALT SERPL-CCNC: 26 U/L
ANION GAP SERPL CALC-SCNC: 8 MMOL/L (ref 0–18)
AST SERPL-CCNC: 15 U/L (ref 15–37)
BILIRUB SERPL-MCNC: 0.6 MG/DL (ref 0.1–2)
BUN BLD-MCNC: 17 MG/DL (ref 7–18)
BUN/CREAT SERPL: 17.2 (ref 10–20)
CALCIUM BLD-MCNC: 9.1 MG/DL (ref 8.5–10.1)
CHLORIDE SERPL-SCNC: 105 MMOL/L (ref 98–112)
CO2 SERPL-SCNC: 23 MMOL/L (ref 21–32)
CREAT BLD-MCNC: 0.99 MG/DL
CREAT UR-SCNC: 156 MG/DL
FASTING STATUS PATIENT QL REPORTED: YES
GFR SERPLBLD BASED ON 1.73 SQ M-ARVRAT: 62 ML/MIN/1.73M2 (ref 60–?)
GLOBULIN PLAS-MCNC: 3.8 G/DL (ref 2.8–4.4)
GLUCOSE BLD-MCNC: 201 MG/DL (ref 70–99)
MICROALBUMIN UR-MCNC: 17.6 MG/DL
MICROALBUMIN/CREAT 24H UR-RTO: 112.8 UG/MG (ref ?–30)
OSMOLALITY SERPL CALC.SUM OF ELEC: 289 MOSM/KG (ref 275–295)
POTASSIUM SERPL-SCNC: 5 MMOL/L (ref 3.5–5.1)
PROT SERPL-MCNC: 7.6 G/DL (ref 6.4–8.2)
SODIUM SERPL-SCNC: 136 MMOL/L (ref 136–145)

## 2022-08-25 PROCEDURE — 82043 UR ALBUMIN QUANTITATIVE: CPT | Performed by: NURSE PRACTITIONER

## 2022-08-25 PROCEDURE — 80053 COMPREHEN METABOLIC PANEL: CPT | Performed by: NURSE PRACTITIONER

## 2022-08-25 PROCEDURE — 3061F NEG MICROALBUMINURIA REV: CPT | Performed by: NURSE PRACTITIONER

## 2022-08-25 PROCEDURE — 3060F POS MICROALBUMINURIA REV: CPT | Performed by: NURSE PRACTITIONER

## 2022-08-25 PROCEDURE — 82570 ASSAY OF URINE CREATININE: CPT | Performed by: NURSE PRACTITIONER

## 2022-08-25 PROCEDURE — 36415 COLL VENOUS BLD VENIPUNCTURE: CPT | Performed by: NURSE PRACTITIONER

## 2022-08-26 RX ORDER — CLOTRIMAZOLE AND BETAMETHASONE DIPROPIONATE 10; .64 MG/G; MG/G
CREAM TOPICAL
Qty: 15 G | Refills: 0 | Status: SHIPPED | OUTPATIENT
Start: 2022-08-26

## 2022-08-26 RX ORDER — ROSUVASTATIN CALCIUM 10 MG/1
10 TABLET, COATED ORAL EVERY EVENING
Qty: 90 TABLET | Refills: 1 | Status: SHIPPED | OUTPATIENT
Start: 2022-08-26

## 2022-08-26 NOTE — TELEPHONE ENCOUNTER
Please review. Protocol failed / No Protocol.     Requested Prescriptions   Pending Prescriptions Disp Refills    CLOTRIMAZOLE-BETAMETHASONE 1-0.05 % External Cream [Pharmacy Med Name: Clotrimazole-Betamethasone 1-0.05 % External Cream] 15 g 0     Sig: APPLY  CREAM TOPICALLY TWICE DAILY        There is no refill protocol information for this order        ROSUVASTATIN 10 MG Oral Tab [Pharmacy Med Name: Rosuvastatin Calcium 10 MG Oral Tablet] 90 tablet 0     Sig: TAKE 1 TABLET BY MOUTH ONCE DAILY IN THE EVENING        Cholesterol Medication Protocol Passed - 8/26/2022  3:15 PM        Passed - ALT in past 12 months        Passed - LDL in past 12 months        Passed - Last ALT < 80       Lab Results   Component Value Date    ALT 26 08/25/2022             Passed - Last LDL < 130     Lab Results   Component Value Date    LDL 66 12/07/2021               Passed - In person appointment or virtual visit in the past 12 mos or appointment in next 3 mos       Recent Outpatient Visits              1 month ago Type 2 diabetes mellitus with hyperglycemia, without long-term current use of insulin ([facility]    [address], [address], [address]    Office Visit    2 months ago Uncontrolled type 2 diabetes mellitus with hyperglycemia [facility])    [address] [place], [address], [address] [facility]    Office Visit    5 months ago Type 2 diabetes mellitus with hyperglycemia, without long-term current use of insulin [facility])    [address] [place], [address], [address] [facility]    Office Visit    7 months ago Pressure sensation in right ear    [facility], [place], Lacho Christie MD    Office Visit    8 months ago Uncontrolled type 2 diabetes mellitus with hyperglycemia [facility])    [address] [place], [address], [address]    Office Visit     Future Appointments         Provider Department Appt Notes    In 3 days Kim Faria, [address] [place] Jeff follow up    In 3 weeks Elza Jaime MD [address] [place] [address], [address], [place] physical / PAP   last px 9/54/8308  (policy informed)    In 4 months Jamey Tesfaye MD [facility], [address], [address]                      Recent Outpatient Visits              1 month ago Type 2 diabetes mellitus with hyperglycemia, without long-term current use of insulin [facility])    [address] [place] [address], [address], [address] [facility], APR    Office Visit    2 months ago Uncontrolled type 2 diabetes mellitus with hyperglycemia [facility])    [address] [place] [address], [address], [address] [facility], APR    Office Visit    5 months ago Type 2 diabetes mellitus with hyperglycemia, without long-term current use of insulin [facility])    [address] [place] [address], [address], [address] [facility], APRN    Office Visit    7 months ago Pressure sensation in right ear    [facility], [address], Khang Christie MD    Office Visit    8 months ago Uncontrolled type 2 diabetes mellitus with hyperglycemia [facility])    [address] [place] [address], [address], [address] [facility], [address]    Office Visit              Future Appointments         Provider Department Appt Notes    In 3 days Adali Ayaan [employer], [address], [place] follow up    In 3 weeks Elza Jaime MD [address] [place] [address], [address], [place] physical / PAP   last px 8/35/3954  (policy informed)    In 4 months Jamey Tesfaye MD [facility], [address], Sundeep Talavera

## 2022-08-29 ENCOUNTER — OFFICE VISIT (OUTPATIENT)
Dept: ENDOCRINOLOGY CLINIC | Facility: CLINIC | Age: 67
End: 2022-08-29
Payer: COMMERCIAL

## 2022-08-29 VITALS
DIASTOLIC BLOOD PRESSURE: 76 MMHG | BODY MASS INDEX: 31 KG/M2 | WEIGHT: 147 LBS | HEART RATE: 88 BPM | SYSTOLIC BLOOD PRESSURE: 116 MMHG

## 2022-08-29 DIAGNOSIS — E11.65 TYPE 2 DIABETES MELLITUS WITH HYPERGLYCEMIA, WITHOUT LONG-TERM CURRENT USE OF INSULIN (HCC): Primary | ICD-10-CM

## 2022-08-29 LAB
CARTRIDGE LOT#: NORMAL NUMERIC
GLUCOSE BLOOD: 284
TEST STRIP LOT #: NORMAL NUMERIC

## 2022-08-29 PROCEDURE — 99214 OFFICE O/P EST MOD 30 MIN: CPT | Performed by: NURSE PRACTITIONER

## 2022-08-29 PROCEDURE — 82947 ASSAY GLUCOSE BLOOD QUANT: CPT | Performed by: NURSE PRACTITIONER

## 2022-08-29 PROCEDURE — 83036 HEMOGLOBIN GLYCOSYLATED A1C: CPT | Performed by: NURSE PRACTITIONER

## 2022-08-29 PROCEDURE — 3044F HG A1C LEVEL LT 7.0%: CPT | Performed by: NURSE PRACTITIONER

## 2022-08-29 PROCEDURE — 3078F DIAST BP <80 MM HG: CPT | Performed by: NURSE PRACTITIONER

## 2022-08-29 PROCEDURE — 3074F SYST BP LT 130 MM HG: CPT | Performed by: NURSE PRACTITIONER

## 2022-08-29 RX ORDER — DULAGLUTIDE 0.75 MG/.5ML
0.75 INJECTION, SOLUTION SUBCUTANEOUS WEEKLY
Qty: 6 ML | Refills: 0 | Status: SHIPPED | OUTPATIENT
Start: 2022-08-29

## 2022-08-29 NOTE — PATIENT INSTRUCTIONS
A1C: 8.9% today --> reduced from 9.3% on 7/6/2022  Blood glucose: 284 in clinic today    Medications:   -increase Glimepiride 4 -->6 mg  in AM (30 mins before BF)      - reduce to 4mg if able to start trulicity       2mg in PM (30 mins before dinner)  -continue with Metformin 1,000mg with breakfast             1,000mg with dinner    Weight:  Wt Readings from Last 6 Encounters:  08/29/22 : 147 lb (66.7 kg)  07/06/22 : 148 lb 9.6 oz (67.4 kg)  06/01/22 : 149 lb 9.6 oz (67.9 kg)  02/28/22 : 149 lb (67.6 kg)  01/07/22 : 148 lb (67.1 kg)  12/08/21 : 148 lb (67.1 kg)    A1C goal:  <7.5%    Blood sugar testing:  Test your blood sugar 1 time daily   Recommended times to test: Before breakfast (fasting) or before dinner or bedtime     Blood sugar targets:  Before breakfast:  (preferably < 110)  Before meals OR 2 hours after meals: <180 (preferably <150)     Call for persistent blood sugars < 75 or > 200

## 2022-09-21 ENCOUNTER — OFFICE VISIT (OUTPATIENT)
Dept: INTERNAL MEDICINE CLINIC | Facility: CLINIC | Age: 67
End: 2022-09-21

## 2022-09-21 VITALS
HEART RATE: 91 BPM | SYSTOLIC BLOOD PRESSURE: 142 MMHG | DIASTOLIC BLOOD PRESSURE: 80 MMHG | WEIGHT: 146 LBS | HEIGHT: 58 IN | BODY MASS INDEX: 30.64 KG/M2

## 2022-09-21 DIAGNOSIS — Z12.31 VISIT FOR SCREENING MAMMOGRAM: ICD-10-CM

## 2022-09-21 DIAGNOSIS — E78.5 HYPERLIPIDEMIA, UNSPECIFIED HYPERLIPIDEMIA TYPE: ICD-10-CM

## 2022-09-21 DIAGNOSIS — Z00.00 PHYSICAL EXAM: Primary | ICD-10-CM

## 2022-09-21 DIAGNOSIS — I10 ESSENTIAL HYPERTENSION: ICD-10-CM

## 2022-09-21 DIAGNOSIS — E11.69 TYPE 2 DIABETES MELLITUS WITH OTHER SPECIFIED COMPLICATION, WITHOUT LONG-TERM CURRENT USE OF INSULIN (HCC): ICD-10-CM

## 2022-09-21 PROCEDURE — 99397 PER PM REEVAL EST PAT 65+ YR: CPT | Performed by: INTERNAL MEDICINE

## 2022-09-21 PROCEDURE — 3077F SYST BP >= 140 MM HG: CPT | Performed by: INTERNAL MEDICINE

## 2022-09-21 PROCEDURE — 3079F DIAST BP 80-89 MM HG: CPT | Performed by: INTERNAL MEDICINE

## 2022-09-21 PROCEDURE — 1126F AMNT PAIN NOTED NONE PRSNT: CPT | Performed by: INTERNAL MEDICINE

## 2022-09-21 PROCEDURE — 3008F BODY MASS INDEX DOCD: CPT | Performed by: INTERNAL MEDICINE

## 2022-09-21 RX ORDER — CALCIUM CARBONATE 500(1250)
TABLET ORAL
Qty: 30 TABLET | Refills: 0 | Status: CANCELLED | OUTPATIENT
Start: 2022-09-21 | End: 2022-10-21

## 2022-09-23 RX ORDER — AMLODIPINE BESYLATE 10 MG/1
10 TABLET ORAL DAILY
Qty: 90 TABLET | Refills: 1 | Status: SHIPPED | OUTPATIENT
Start: 2022-09-23

## 2022-09-23 NOTE — TELEPHONE ENCOUNTER
Please review refill protocol failed/ no protocol  Requested Prescriptions   Pending Prescriptions Disp Refills    AMLODIPINE 10 MG Oral Tab [Pharmacy Med Name: amLODIPine Besylate 10 MG Oral Tablet] 90 tablet 0     Sig: Take 1 tablet by mouth once daily        Hypertensive Medications Protocol Failed - 9/23/2022  3:40 PM        Failed - Last BP reading less than 140/90     BP Readings from Last 1 Encounters:  09/21/22 : 142/80                Passed - In person appointment in the past 12 or next 3 months       Recent Outpatient Visits              2 days ago Physical exam    Fely Carlton MD    Office Visit    3 weeks ago Type 2 diabetes mellitus with hyperglycemia, without long-term current use of insulin [facility])    [facility], [facility], [address] [facility]    Office Visit    2 months ago Type 2 diabetes mellitus with hyperglycemia, without long-term current use of insulin [facility])    [facility], [address], [address] [facility]    Office Visit    3 months ago Uncontrolled type 2 diabetes mellitus with hyperglycemia [facility])    [facility], [facility], [address] [facility]    Office Visit    6 months ago Type 2 diabetes mellitus with hyperglycemia, without long-term current use of insulin [facility])    [facility], [facility] [address], [facility], [address]    Office Visit     Future Appointments         Provider Department Appt Notes    In 3 months Hero Amos MD [facility], [address], [place] 1 850 [facility] or [facility] in past 6 months     Recent Results (from the past 4392 hour(s))   COMP METABOLIC PANEL (14)    Collection Time: 08/25/22 11:07 AM   Result Value Ref Range    Glucose 201 (H) 70 - 99 mg/dL    Sodium 136 136 - 145 mmol/L    Potassium 5.0 3.5 - 5.1 mmol/L    Chloride 105 98 - 112 mmol/L    CO2 23.0 21.0 - 32.0 mmol/L    Anion Gap 8 0 - 18 mmol/L    BUN 17 7 - 18 mg/dL Creatinine 0.99 0.55 - 1.02 mg/dL    BUN/CREA Ratio 17.2 10.0 - 20.0    Calcium, Total 9.1 8.5 - 10.1 mg/dL    Calculated Osmolality 289 275 - 295 mOsm/kg    eGFR-Cr 62 >=60 mL/min/1.73m2    ALT 26 13 - 56 U/L    AST 15 15 - 37 U/L    Alkaline Phosphatase 68 55 - 142 U/L    Bilirubin, Total 0.6 0.1 - 2.0 mg/dL    Total Protein 7.6 6.4 - 8.2 g/dL    Albumin 3.8 3.4 - 5.0 g/dL    Globulin  3.8 2.8 - 4.4 g/dL    A/G Ratio 1.0 1.0 - 2.0    Patient Fasting for CMP? Yes      *Note: Due to a large number of results and/or encounters for the requested time period, some results have not been displayed. A complete set of results can be found in Results Review.                  Passed - In person appointment or virtual visit in the past 6 months       Recent Outpatient Visits              2 days ago Physical exam    Tio Murcia MD    Office Visit    3 weeks ago Type 2 diabetes mellitus with hyperglycemia, without long-term current use of insulin [facility])    [facility], [facility], [facility]    Office Visit    2 months ago Type 2 diabetes mellitus with hyperglycemia, without long-term current use of insulin [facility])    [facility], [address], [address] [facility]    Office Visit    3 months ago Uncontrolled type 2 diabetes mellitus with hyperglycemia [facility])    [facility], [facility], [facility]    Office Visit    6 months ago Type 2 diabetes mellitus with hyperglycemia, without long-term current use of insulin [facility])    [facility], [facility], [facility], [facility] [address]    Office Visit     Future Appointments         Provider Department Appt Notes    In 3 months Iqra Hunt MD [facility], [address], [place] [address] or GFRNAA > 50     GFR Evaluation  EGFRCR: 62 , resulted on 8/25/2022

## 2022-09-23 NOTE — TELEPHONE ENCOUNTER
Please review refill protocol failed/ no protocol  Requested Prescriptions   Pending Prescriptions Disp Refills    METFORMIN 500 MG Oral Tab [Pharmacy Med Name: metFORMIN HCl 500 MG Oral Tablet] 360 tablet 0     Sig: TAKE 2 TABLETS BY MOUTH TWICE DAILY WITH MEALS        Diabetes Medication Protocol Failed - 9/23/2022  3:40 PM        Failed - Last A1C < 7.5 and within past 6 months     Lab Results   Component Value Date    A1C 8.9% 08/29/2022               Passed - In person appointment or virtual visit in the past 6 mos or appointment in next 3 mos       Recent Outpatient Visits              2 days ago Physical exam    Cornelio Trammell MD    Office Visit    3 weeks ago Type 2 diabetes mellitus with hyperglycemia, without long-term current use of insulin [facility])    [address], [address], [address] [facility]    Office Visit    2 months ago Type 2 diabetes mellitus with hyperglycemia, without long-term current use of insulin [facility])    [address] [place], [address], [address] [facility]    Office Visit    3 months ago Uncontrolled type 2 diabetes mellitus with hyperglycemia [facility])    [address] [place], [address], [address] [facility]    Office Visit    6 months ago Type 2 diabetes mellitus with hyperglycemia, without long-term current use of insulin [facility])    [address] [place], [address], [address] [facility], [address]    Office Visit     Future Appointments         Provider Department Appt Notes    In 3 months Libia Kyle MD [facility], [address], [place] [address] or GFRNAA > 50     GFR Evaluation  EGFRCR: 62 , resulted on 8/25/2022            Passed - GFR in the past 12 months

## 2022-10-14 ENCOUNTER — LAB ENCOUNTER (OUTPATIENT)
Dept: LAB | Age: 67
End: 2022-10-14
Attending: INTERNAL MEDICINE
Payer: COMMERCIAL

## 2022-10-14 DIAGNOSIS — E78.5 HYPERLIPIDEMIA, UNSPECIFIED HYPERLIPIDEMIA TYPE: ICD-10-CM

## 2022-10-14 LAB
BASOPHILS # BLD AUTO: 0.06 X10(3) UL (ref 0–0.2)
BASOPHILS NFR BLD AUTO: 1.2 %
BILIRUB UR QL: NEGATIVE
CHOLEST SERPL-MCNC: 143 MG/DL (ref ?–200)
COLOR UR: YELLOW
DEPRECATED RDW RBC AUTO: 40.1 FL (ref 35.1–46.3)
EOSINOPHIL # BLD AUTO: 0.14 X10(3) UL (ref 0–0.7)
EOSINOPHIL NFR BLD AUTO: 2.9 %
ERYTHROCYTE [DISTWIDTH] IN BLOOD BY AUTOMATED COUNT: 11.5 % (ref 11–15)
FASTING PATIENT LIPID ANSWER: YES
GLUCOSE UR-MCNC: NEGATIVE MG/DL
HCT VFR BLD AUTO: 41 %
HDLC SERPL-MCNC: 72 MG/DL (ref 40–59)
HGB BLD-MCNC: 13.1 G/DL
HGB UR QL STRIP.AUTO: NEGATIVE
IMM GRANULOCYTES # BLD AUTO: 0.01 X10(3) UL (ref 0–1)
IMM GRANULOCYTES NFR BLD: 0.2 %
KETONES UR-MCNC: NEGATIVE MG/DL
LDLC SERPL CALC-MCNC: 56 MG/DL (ref ?–100)
LYMPHOCYTES # BLD AUTO: 1.4 X10(3) UL (ref 1–4)
LYMPHOCYTES NFR BLD AUTO: 28.5 %
MCH RBC QN AUTO: 30.4 PG (ref 26–34)
MCHC RBC AUTO-ENTMCNC: 32 G/DL (ref 31–37)
MCV RBC AUTO: 95.1 FL
MONOCYTES # BLD AUTO: 0.37 X10(3) UL (ref 0.1–1)
MONOCYTES NFR BLD AUTO: 7.5 %
NEUTROPHILS # BLD AUTO: 2.93 X10 (3) UL (ref 1.5–7.7)
NEUTROPHILS # BLD AUTO: 2.93 X10(3) UL (ref 1.5–7.7)
NEUTROPHILS NFR BLD AUTO: 59.7 %
NITRITE UR QL STRIP.AUTO: POSITIVE
NONHDLC SERPL-MCNC: 71 MG/DL (ref ?–130)
PH UR: 6 [PH] (ref 5–8)
PLATELET # BLD AUTO: 274 10(3)UL (ref 150–450)
PROT UR-MCNC: 30 MG/DL
RBC # BLD AUTO: 4.31 X10(6)UL
SP GR UR STRIP: 1.01 (ref 1–1.03)
T4 FREE SERPL-MCNC: 1.2 NG/DL (ref 0.8–1.7)
TRIGL SERPL-MCNC: 76 MG/DL (ref 30–149)
TSI SER-ACNC: 1.59 MIU/ML (ref 0.36–3.74)
UROBILINOGEN UR STRIP-ACNC: <2
VIT C UR-MCNC: NEGATIVE MG/DL
VLDLC SERPL CALC-MCNC: 11 MG/DL (ref 0–30)
WBC # BLD AUTO: 4.9 X10(3) UL (ref 4–11)
WBC #/AREA URNS AUTO: >50 /HPF
WBC CLUMPS UR QL AUTO: PRESENT /HPF

## 2022-10-14 PROCEDURE — 85025 COMPLETE CBC W/AUTO DIFF WBC: CPT | Performed by: INTERNAL MEDICINE

## 2022-10-14 PROCEDURE — 84443 ASSAY THYROID STIM HORMONE: CPT

## 2022-10-14 PROCEDURE — 84439 ASSAY OF FREE THYROXINE: CPT

## 2022-10-14 PROCEDURE — 80061 LIPID PANEL: CPT | Performed by: INTERNAL MEDICINE

## 2022-10-14 PROCEDURE — 36415 COLL VENOUS BLD VENIPUNCTURE: CPT | Performed by: INTERNAL MEDICINE

## 2022-10-14 PROCEDURE — 81001 URINALYSIS AUTO W/SCOPE: CPT | Performed by: INTERNAL MEDICINE

## 2022-10-17 ENCOUNTER — LAB ENCOUNTER (OUTPATIENT)
Dept: LAB | Age: 67
End: 2022-10-17
Attending: INTERNAL MEDICINE
Payer: COMMERCIAL

## 2022-10-17 PROCEDURE — 87077 CULTURE AEROBIC IDENTIFY: CPT | Performed by: INTERNAL MEDICINE

## 2022-10-17 PROCEDURE — 87186 SC STD MICRODIL/AGAR DIL: CPT | Performed by: INTERNAL MEDICINE

## 2022-10-17 PROCEDURE — 81015 MICROSCOPIC EXAM OF URINE: CPT | Performed by: INTERNAL MEDICINE

## 2022-10-17 PROCEDURE — 81001 URINALYSIS AUTO W/SCOPE: CPT | Performed by: INTERNAL MEDICINE

## 2022-10-17 PROCEDURE — 87086 URINE CULTURE/COLONY COUNT: CPT | Performed by: INTERNAL MEDICINE

## 2022-11-10 RX ORDER — GLIMEPIRIDE 2 MG/1
TABLET ORAL
Qty: 270 TABLET | Refills: 0 | Status: SHIPPED | OUTPATIENT
Start: 2022-11-10

## 2022-11-15 ENCOUNTER — OFFICE VISIT (OUTPATIENT)
Dept: DERMATOLOGY CLINIC | Facility: CLINIC | Age: 67
End: 2022-11-15
Payer: COMMERCIAL

## 2022-11-15 DIAGNOSIS — L30.9 DERMATITIS: Primary | ICD-10-CM

## 2022-11-15 PROCEDURE — 1126F AMNT PAIN NOTED NONE PRSNT: CPT | Performed by: PHYSICIAN ASSISTANT

## 2022-11-15 PROCEDURE — 99203 OFFICE O/P NEW LOW 30 MIN: CPT | Performed by: PHYSICIAN ASSISTANT

## 2022-11-15 RX ORDER — MOXIFLOXACIN 5 MG/ML
SOLUTION/ DROPS OPHTHALMIC
COMMUNITY
Start: 2022-10-26

## 2022-11-15 RX ORDER — KETOROLAC TROMETHAMINE 5 MG/ML
SOLUTION OPHTHALMIC
COMMUNITY
Start: 2022-10-26

## 2022-11-15 RX ORDER — FLUOCINONIDE 0.5 MG/G
1 OINTMENT TOPICAL 2 TIMES DAILY
Qty: 60 G | Refills: 2 | Status: SHIPPED | OUTPATIENT
Start: 2022-11-15

## 2022-11-15 RX ORDER — KETOCONAZOLE 20 MG/G
CREAM TOPICAL
Qty: 30 G | Refills: 1 | Status: SHIPPED | OUTPATIENT
Start: 2022-11-15

## 2022-11-15 RX ORDER — PREDNISOLONE ACETATE 10 MG/ML
SUSPENSION/ DROPS OPHTHALMIC
COMMUNITY
Start: 2022-10-26

## 2023-01-30 ENCOUNTER — OFFICE VISIT (OUTPATIENT)
Dept: ENDOCRINOLOGY CLINIC | Facility: CLINIC | Age: 68
End: 2023-01-30

## 2023-01-30 VITALS
BODY MASS INDEX: 31 KG/M2 | WEIGHT: 146 LBS | SYSTOLIC BLOOD PRESSURE: 132 MMHG | DIASTOLIC BLOOD PRESSURE: 78 MMHG | HEART RATE: 89 BPM

## 2023-01-30 DIAGNOSIS — E11.65 TYPE 2 DIABETES MELLITUS WITH HYPERGLYCEMIA, WITHOUT LONG-TERM CURRENT USE OF INSULIN (HCC): Primary | ICD-10-CM

## 2023-01-30 LAB
CARTRIDGE LOT#: ABNORMAL NUMERIC
GLUCOSE BLOOD: 334
HEMOGLOBIN A1C: 8.3 % (ref 4.3–5.6)
TEST STRIP LOT #: NORMAL NUMERIC

## 2023-01-30 PROCEDURE — 99214 OFFICE O/P EST MOD 30 MIN: CPT | Performed by: NURSE PRACTITIONER

## 2023-01-30 PROCEDURE — 3075F SYST BP GE 130 - 139MM HG: CPT | Performed by: NURSE PRACTITIONER

## 2023-01-30 PROCEDURE — 83036 HEMOGLOBIN GLYCOSYLATED A1C: CPT | Performed by: NURSE PRACTITIONER

## 2023-01-30 PROCEDURE — 82947 ASSAY GLUCOSE BLOOD QUANT: CPT | Performed by: NURSE PRACTITIONER

## 2023-01-30 PROCEDURE — 3052F HG A1C>EQUAL 8.0%<EQUAL 9.0%: CPT | Performed by: NURSE PRACTITIONER

## 2023-01-30 PROCEDURE — 3078F DIAST BP <80 MM HG: CPT | Performed by: NURSE PRACTITIONER

## 2023-01-30 NOTE — PATIENT INSTRUCTIONS
A1C: 8.3% today --> decreased from 8.9% on 8/29/2022  Blood glucose: 334 in clinic today  Endocrinology fax# [phone]    Medications:   -increase Glimepiride 4 --> 6mg (3 tablets) with breakfast                   2mg (1 tablet) with dinner   -continue with Metformin 1,000mg with breakfast                   1,000mg with dinner  -start Trulicity 5.64WX once weekly  --> fill out patient assistance program and bring back to me for faxing on Wednesday       Follow up with Atilio Barraza -- dermatology       A1C goal:  <7.5%    Blood sugar testing:  Test your blood sugar 1 time daily   Recommended times to test: alternate with before breakfast (fasting) or before dinner or bedtime     Blood sugar targets:  Before breakfast:   (preferably < 110)  Before meals OR 2 hours after meals: <180 (preferably <150)     Call for persistent blood sugars < 75 or > 200

## 2023-02-16 ENCOUNTER — OFFICE VISIT (OUTPATIENT)
Dept: DERMATOLOGY CLINIC | Facility: CLINIC | Age: 68
End: 2023-02-16

## 2023-02-16 DIAGNOSIS — L30.4 INTERTRIGO: Primary | ICD-10-CM

## 2023-02-16 PROCEDURE — 99213 OFFICE O/P EST LOW 20 MIN: CPT | Performed by: PHYSICIAN ASSISTANT

## 2023-02-16 RX ORDER — KETOCONAZOLE 20 MG/G
CREAM TOPICAL
Qty: 60 G | Refills: 1 | Status: SHIPPED | OUTPATIENT
Start: 2023-02-16

## 2023-02-20 RX ORDER — GLIMEPIRIDE 2 MG/1
TABLET ORAL
Qty: 270 TABLET | Refills: 0 | Status: SHIPPED | OUTPATIENT
Start: 2023-02-20

## 2023-02-21 ENCOUNTER — TELEPHONE (OUTPATIENT)
Dept: ENDOCRINOLOGY CLINIC | Facility: CLINIC | Age: 68
End: 2023-02-21

## 2023-02-21 NOTE — TELEPHONE ENCOUNTER
Received a fax of pt's recent Eye Exam dated on 2/14/2023 from [facility] with Rudy Blackburn OD. Placed in providers folder for review.

## 2023-02-22 ENCOUNTER — HOSPITAL ENCOUNTER (OUTPATIENT)
Dept: MAMMOGRAPHY | Age: 68
Discharge: HOME | End: 2023-02-22
Attending: INTERNAL MEDICINE
Payer: COMMERCIAL

## 2023-02-22 DIAGNOSIS — Z12.31 VISIT FOR SCREENING MAMMOGRAM: ICD-10-CM

## 2023-02-22 PROCEDURE — 77067 SCR MAMMO BI INCL CAD: CPT | Performed by: INTERNAL MEDICINE

## 2023-02-22 PROCEDURE — 77063 BREAST TOMOSYNTHESIS BI: CPT | Performed by: INTERNAL MEDICINE

## 2023-03-08 ENCOUNTER — OFFICE VISIT (OUTPATIENT)
Dept: DERMATOLOGY CLINIC | Facility: CLINIC | Age: 68
End: 2023-03-08

## 2023-03-08 DIAGNOSIS — L81.4 LENTIGO: ICD-10-CM

## 2023-03-08 DIAGNOSIS — D23.9 BENIGN NEOPLASM OF SKIN, UNSPECIFIED LOCATION: ICD-10-CM

## 2023-03-08 DIAGNOSIS — D22.9 MULTIPLE NEVI: ICD-10-CM

## 2023-03-08 DIAGNOSIS — L82.1 SK (SEBORRHEIC KERATOSIS): ICD-10-CM

## 2023-03-08 DIAGNOSIS — Q80.0 ICHTHYOSIS VULGARIS: ICD-10-CM

## 2023-03-08 DIAGNOSIS — L30.9 DERMATITIS: Primary | ICD-10-CM

## 2023-03-09 RX ORDER — ROSUVASTATIN CALCIUM 10 MG/1
10 TABLET, COATED ORAL EVERY EVENING
Qty: 90 TABLET | Refills: 3 | Status: SHIPPED | OUTPATIENT
Start: 2023-03-09

## 2023-03-09 NOTE — TELEPHONE ENCOUNTER
Refill passed per [place] [employer] protocol.   Requested Prescriptions   Pending Prescriptions Disp Refills    ROSUVASTATIN 10 MG Oral Tab [Pharmacy Med Name: Rosuvastatin Calcium 10 MG Oral Tablet] 90 tablet 0     Sig: TAKE 1 TABLET BY MOUTH ONCE DAILY IN THE EVENING       Cholesterol Medication Protocol Passed - 3/8/2023  1:34 PM        Passed - ALT in past 12 months        Passed - LDL in past 12 months        Passed - Last ALT < 80     Lab Results   Component Value Date    ALT 26 08/25/2022             Passed - Last LDL < 130     Lab Results   Component Value Date    LDL 56 10/14/2022             Passed - In person appointment or virtual visit in the past 12 mos or appointment in next 3 mos     Recent Outpatient Visits              Yesterday     Jovani Reddy MD    Office Visit    3 weeks ago [facility], [address]man, [address], [place], [place]    Office Visit    1 month ago Type 2 diabetes mellitus with hyperglycemia, without long-term current use of insulin [facility])    [address], [address], [address] [facility]    Office Visit    3 months ago Dermatitis    [address], [address], [address] [place], [place], PA-C    Office Visit    5 months ago Physical exam    Kaiden Mccoy MD    Office Visit                         Recent Outpatient Visits              Yesterday     Sundeep Greco MD    Office Visit    3 weeks ago [employer], [address], [address], [place], PA-C    Office Visit    1 month ago Type 2 diabetes mellitus with hyperglycemia, without long-term current use of insulin [facility])    [address], [address], [address]    Office Visit    3 months ago Dermatitis No [place], Fabi Moran PA-C    Office Visit    5 months ago Physical exam    Dulce Reed MD    Office Visit

## 2023-04-26 ENCOUNTER — NURSE TRIAGE (OUTPATIENT)
Dept: INTERNAL MEDICINE CLINIC | Facility: CLINIC | Age: 68
End: 2023-04-26

## 2023-04-28 ENCOUNTER — OFFICE VISIT (OUTPATIENT)
Dept: INTERNAL MEDICINE CLINIC | Facility: CLINIC | Age: 68
End: 2023-04-28

## 2023-04-28 ENCOUNTER — HOSPITAL ENCOUNTER (OUTPATIENT)
Dept: GENERAL RADIOLOGY | Age: 68
Discharge: HOME | End: 2023-04-28
Attending: INTERNAL MEDICINE
Payer: COMMERCIAL

## 2023-04-28 VITALS
SYSTOLIC BLOOD PRESSURE: 159 MMHG | BODY MASS INDEX: 30.23 KG/M2 | HEART RATE: 92 BPM | WEIGHT: 144 LBS | DIASTOLIC BLOOD PRESSURE: 74 MMHG | HEIGHT: 58 IN

## 2023-04-28 DIAGNOSIS — R05.1 ACUTE COUGH: ICD-10-CM

## 2023-04-28 DIAGNOSIS — I10 ESSENTIAL HYPERTENSION WITH GOAL BLOOD PRESSURE LESS THAN 130/80: ICD-10-CM

## 2023-04-28 DIAGNOSIS — M23.91 INTERNAL DERANGEMENT OF RIGHT KNEE: Primary | ICD-10-CM

## 2023-04-28 DIAGNOSIS — E78.5 HYPERLIPIDEMIA, UNSPECIFIED HYPERLIPIDEMIA TYPE: ICD-10-CM

## 2023-04-28 DIAGNOSIS — M23.91 INTERNAL DERANGEMENT OF RIGHT KNEE: ICD-10-CM

## 2023-04-28 PROCEDURE — 99214 OFFICE O/P EST MOD 30 MIN: CPT | Performed by: INTERNAL MEDICINE

## 2023-04-28 PROCEDURE — 3008F BODY MASS INDEX DOCD: CPT | Performed by: INTERNAL MEDICINE

## 2023-04-28 PROCEDURE — 3078F DIAST BP <80 MM HG: CPT | Performed by: INTERNAL MEDICINE

## 2023-04-28 PROCEDURE — 3077F SYST BP >= 140 MM HG: CPT | Performed by: INTERNAL MEDICINE

## 2023-04-28 PROCEDURE — 73562 X-RAY EXAM OF KNEE 3: CPT | Performed by: INTERNAL MEDICINE

## 2023-04-28 RX ORDER — BENZONATATE 200 MG/1
200 CAPSULE ORAL 3 TIMES DAILY PRN
Qty: 60 CAPSULE | Refills: 0 | Status: SHIPPED | OUTPATIENT
Start: 2023-04-28 | End: 2023-04-28

## 2023-04-28 RX ORDER — BENZONATATE 200 MG/1
200 CAPSULE ORAL 3 TIMES DAILY PRN
Qty: 60 CAPSULE | Refills: 0 | Status: SHIPPED | OUTPATIENT
Start: 2023-04-28

## 2023-05-09 PROBLEM — R80.9 DIABETES MELLITUS WITH PROTEINURIA: Status: RESOLVED | Noted: 2017-06-24 | Resolved: 2023-05-09

## 2023-05-09 PROBLEM — E11.29 DIABETES MELLITUS WITH PROTEINURIA (HCC): Status: RESOLVED | Noted: 2017-06-24 | Resolved: 2023-05-09

## 2023-05-09 PROBLEM — E11.29 DIABETES MELLITUS WITH PROTEINURIA  (HCC): Status: RESOLVED | Noted: 2017-06-24 | Resolved: 2023-05-09

## 2023-05-09 PROBLEM — R80.9 DIABETES MELLITUS WITH PROTEINURIA (HCC): Status: RESOLVED | Noted: 2017-06-24 | Resolved: 2023-05-09

## 2023-05-09 PROBLEM — R80.9 DIABETES MELLITUS WITH PROTEINURIA  (HCC): Status: RESOLVED | Noted: 2017-06-24 | Resolved: 2023-05-09

## 2023-05-09 PROBLEM — E11.29 DIABETES MELLITUS WITH PROTEINURIA: Status: RESOLVED | Noted: 2017-06-24 | Resolved: 2023-05-09

## 2023-05-12 ENCOUNTER — NURSE TRIAGE (OUTPATIENT)
Dept: INTERNAL MEDICINE CLINIC | Facility: CLINIC | Age: 68
End: 2023-05-12

## 2023-05-12 NOTE — TELEPHONE ENCOUNTER
Spoke to patient and relayed [employer] message below. Patient verbalized understanding and had no further questions. She will go to .

## 2023-05-12 NOTE — TELEPHONE ENCOUNTER
Patient saw Dr Adelfo Gallegos on 4/28/2023 for a cough and was prescribed benzonatate. Has been taking the medication but not helping with the cough. Has had the cough for about 1 month now. No fevers. No shortness of breath or wheezing. No chest pain. No other symptoms. No office visits available today. Offered video visit/telephone visit with Aster Anand but patient declined, did not know if she could do that on her phone. Advised walk in clinic or urgent care. Patient wanted to know if message could be sent to covering provider to see if could prescribe an antibiotic?

## 2023-05-12 NOTE — TELEPHONE ENCOUNTER
Patient needs to be evaluated to see if antibiotic therapy is appropriate. Chest x-ray would also be beneficial due to symptoms lasting x 1 month. Offer urgent care.

## 2023-05-25 ENCOUNTER — HOSPITAL ENCOUNTER (OUTPATIENT)
Dept: GENERAL RADIOLOGY | Facility: HOSPITAL | Age: 68
Discharge: HOME | End: 2023-05-25
Attending: ORTHOPAEDIC SURGERY
Payer: COMMERCIAL

## 2023-05-25 ENCOUNTER — OFFICE VISIT (OUTPATIENT)
Dept: ORTHOPEDICS CLINIC | Facility: CLINIC | Age: 68
End: 2023-05-25

## 2023-05-25 VITALS — WEIGHT: 146 LBS | BODY MASS INDEX: 29.83 KG/M2 | HEIGHT: 58.5 IN

## 2023-05-25 DIAGNOSIS — M17.11 PRIMARY OSTEOARTHRITIS OF RIGHT KNEE: Primary | ICD-10-CM

## 2023-05-25 DIAGNOSIS — M25.561 RIGHT KNEE PAIN, UNSPECIFIED CHRONICITY: ICD-10-CM

## 2023-05-25 PROCEDURE — 99244 OFF/OP CNSLTJ NEW/EST MOD 40: CPT | Performed by: ORTHOPAEDIC SURGERY

## 2023-05-25 PROCEDURE — 3008F BODY MASS INDEX DOCD: CPT | Performed by: ORTHOPAEDIC SURGERY

## 2023-05-25 PROCEDURE — 73564 X-RAY EXAM KNEE 4 OR MORE: CPT | Performed by: ORTHOPAEDIC SURGERY

## 2023-05-25 RX ORDER — GLIMEPIRIDE 2 MG/1
TABLET ORAL
Qty: 270 TABLET | Refills: 0 | Status: SHIPPED | OUTPATIENT
Start: 2023-05-25

## 2023-05-25 NOTE — TELEPHONE ENCOUNTER
Please Review. Protocol Failed or has no protocol.        Requested Prescriptions   Pending Prescriptions Disp Refills    METFORMIN 500 MG Oral Tab [Pharmacy Med Name: metFORMIN HCl 500 MG Oral Tablet] 360 tablet 0     Sig: TAKE 2 TABLETS BY MOUTH TWICE DAILY WITH MEALS       Diabetes Medication Protocol Failed - 5/24/2023  6:58 PM        Failed - Last A1C < 7.5 and within past 6 months     Lab Results   Component Value Date    A1C 8.3 (A) 01/30/2023               Passed - In person appointment or virtual visit in the past 6 mos or appointment in next 3 mos     Recent Outpatient Visits              3 weeks ago Internal derangement of right knee    Dot Hansen MD    Office Visit    2 months ago Sundeep Rojas MD    Office Visit    3 months ago [address] [place]    Office Visit    3 months ago Type 2 diabetes mellitus with hyperglycemia, without long-term current use of insulin [facility])    [address], [facility], [address] [place] [address], [address] [place] [address], [facility]    Office Visit    6 months ago Dermatitis    [address], [address] [facility] Byron[facility]oila casey [place], PA-C    Office Visit          Future Appointments         Provider Department Appt Notes    Today Codi Zelaya MD [address], [address] [facility] Internal derangement of right knee               Passed - EGFRCR or GFRNAA > 50     GFR Evaluation  EGFRCR: 62 , resulted on 8/25/2022            Passed - GFR in the past 12 months           Recent Outpatient Visits              3 weeks ago Internal derangement of right knee    Dot Hansen MD    Office Visit    2 months ago Anna Marie Rojas MD Office Visit    3 months ago [facility] [place]    Office Visit    3 months ago Type 2 diabetes mellitus with hyperglycemia, without long-term current use of insulin [facility])    [address], [address], [address] [facility]    Office Visit    6 months ago Dermatitis    [address], [address] [facility] Fabi Moran PA-C    Office Visit          Future Appointments         Provider Department Appt Notes    Today Codi Zelaya MD [address], [address] [facility] Internal derangement of right knee

## 2023-06-28 ENCOUNTER — OFFICE VISIT (OUTPATIENT)
Dept: ENDOCRINOLOGY CLINIC | Facility: CLINIC | Age: 68
End: 2023-06-28

## 2023-06-28 VITALS
WEIGHT: 144 LBS | SYSTOLIC BLOOD PRESSURE: 165 MMHG | DIASTOLIC BLOOD PRESSURE: 75 MMHG | HEART RATE: 79 BPM | BODY MASS INDEX: 30 KG/M2

## 2023-06-28 DIAGNOSIS — E11.3293 TYPE 2 DIABETES MELLITUS WITH BOTH EYES AFFECTED BY MILD NONPROLIFERATIVE RETINOPATHY WITHOUT MACULAR EDEMA, WITHOUT LONG-TERM CURRENT USE OF INSULIN (HCC): Primary | ICD-10-CM

## 2023-06-28 LAB
CARTRIDGE LOT#: ABNORMAL NUMERIC
GLUCOSE BLOOD: 201
HEMOGLOBIN A1C: 8.4 % (ref 4.3–5.6)
TEST STRIP LOT #: NORMAL NUMERIC

## 2023-06-28 PROCEDURE — 3077F SYST BP >= 140 MM HG: CPT | Performed by: NURSE PRACTITIONER

## 2023-06-28 PROCEDURE — 82947 ASSAY GLUCOSE BLOOD QUANT: CPT | Performed by: NURSE PRACTITIONER

## 2023-06-28 PROCEDURE — 99214 OFFICE O/P EST MOD 30 MIN: CPT | Performed by: NURSE PRACTITIONER

## 2023-06-28 PROCEDURE — 3078F DIAST BP <80 MM HG: CPT | Performed by: NURSE PRACTITIONER

## 2023-06-28 PROCEDURE — 3052F HG A1C>EQUAL 8.0%<EQUAL 9.0%: CPT | Performed by: NURSE PRACTITIONER

## 2023-06-28 PROCEDURE — 83036 HEMOGLOBIN GLYCOSYLATED A1C: CPT | Performed by: NURSE PRACTITIONER

## 2023-06-28 RX ORDER — PIOGLITAZONEHYDROCHLORIDE 30 MG/1
30 TABLET ORAL DAILY
Qty: 90 TABLET | Refills: 0 | Status: SHIPPED | OUTPATIENT
Start: 2023-06-28 | End: 2023-09-26

## 2023-08-14 RX ORDER — GLIMEPIRIDE 2 MG/1
TABLET ORAL
Qty: 270 TABLET | Refills: 0 | Status: SHIPPED | OUTPATIENT
Start: 2023-08-14

## 2023-08-27 NOTE — TELEPHONE ENCOUNTER
Bilateral L5 TFESI.   Please review. Protocol failed / No Protocol. Requested Prescriptions   Pending Prescriptions Disp Refills    METFORMIN 500 MG Oral Tab [Pharmacy Med Name: metFORMIN HCl 500 MG Oral Tablet] 360 tablet 0     Sig: TAKE 2 TABLETS BY MOUTH TWICE DAILY WITH MEALS       Diabetes Medication Protocol Failed - 8/26/2023  9:10 AM        Failed - Last A1C < 7.5 and within past 6 months     Lab Results   Component Value Date    A1C 8.4 (A) 06/28/2023             Failed - EGFRCR or GFRNAA > 50     GFR Evaluation            Failed - GFR in the past 12 months        Passed - In person appointment or virtual visit in the past 6 mos or appointment in next 3 mos     Recent Outpatient Visits              2 months ago Type 2 diabetes mellitus with both eyes affected by mild nonproliferative retinopathy without macular edema, without long-term current use of insulin ([facility] [address]    [address], GISELA [place]    Office Visit    3 months ago Primary osteoarthritis of right knee    [facility], [address] [facility],[address], [place], Collin Spicer MD    Office Visit    4 months ago Internal derangement of right knee    Deanna Mccracken MD    Office Visit    5 months ago Dermatitis    Sundeep Goodwin MD    Office Visit    6 months ago [facility], [address] [place], [place]    Office Visit          Future Appointments         Provider Department Appt Notes    In 3 days GISELA Sorensen Addison Return in about 2 months (around 8/28/2023) for follow up.

## 2023-08-30 ENCOUNTER — OFFICE VISIT (OUTPATIENT)
Dept: ENDOCRINOLOGY CLINIC | Facility: CLINIC | Age: 68
End: 2023-08-30

## 2023-08-30 VITALS
WEIGHT: 147 LBS | DIASTOLIC BLOOD PRESSURE: 78 MMHG | HEIGHT: 58.5 IN | BODY MASS INDEX: 30.03 KG/M2 | HEART RATE: 76 BPM | SYSTOLIC BLOOD PRESSURE: 152 MMHG

## 2023-08-30 DIAGNOSIS — I10 ESSENTIAL HYPERTENSION WITH GOAL BLOOD PRESSURE LESS THAN 130/80: ICD-10-CM

## 2023-08-30 DIAGNOSIS — E78.5 HYPERLIPIDEMIA, UNSPECIFIED HYPERLIPIDEMIA TYPE: ICD-10-CM

## 2023-08-30 DIAGNOSIS — E11.3293 TYPE 2 DIABETES MELLITUS WITH BOTH EYES AFFECTED BY MILD NONPROLIFERATIVE RETINOPATHY WITHOUT MACULAR EDEMA, WITHOUT LONG-TERM CURRENT USE OF INSULIN (HCC): Primary | ICD-10-CM

## 2023-08-30 LAB
CARTRIDGE LOT#: ABNORMAL NUMERIC
GLUCOSE BLOOD: 127
HEMOGLOBIN A1C: 7.3 % (ref 4.3–5.6)
TEST STRIP LOT #: NORMAL NUMERIC

## 2023-08-30 PROCEDURE — 99214 OFFICE O/P EST MOD 30 MIN: CPT | Performed by: NURSE PRACTITIONER

## 2023-08-30 PROCEDURE — 3077F SYST BP >= 140 MM HG: CPT | Performed by: NURSE PRACTITIONER

## 2023-08-30 PROCEDURE — 3008F BODY MASS INDEX DOCD: CPT | Performed by: NURSE PRACTITIONER

## 2023-08-30 PROCEDURE — 82947 ASSAY GLUCOSE BLOOD QUANT: CPT | Performed by: NURSE PRACTITIONER

## 2023-08-30 PROCEDURE — 3051F HG A1C>EQUAL 7.0%<8.0%: CPT | Performed by: NURSE PRACTITIONER

## 2023-08-30 PROCEDURE — 83036 HEMOGLOBIN GLYCOSYLATED A1C: CPT | Performed by: NURSE PRACTITIONER

## 2023-08-30 PROCEDURE — 3078F DIAST BP <80 MM HG: CPT | Performed by: NURSE PRACTITIONER

## 2023-08-30 RX ORDER — LISINOPRIL 5 MG/1
5 TABLET ORAL DAILY
Qty: 90 TABLET | Refills: 0 | Status: SHIPPED | OUTPATIENT
Start: 2023-08-30

## 2023-09-07 ENCOUNTER — TELEPHONE (OUTPATIENT)
Dept: ENDOCRINOLOGY CLINIC | Facility: CLINIC | Age: 68
End: 2023-09-07

## 2023-09-07 NOTE — TELEPHONE ENCOUNTER
Pt is requesting to speak with RN or APN. Pt states that she keeps getting weak. Pt states that BS keep dropping low.   Please call

## 2023-09-07 NOTE — TELEPHONE ENCOUNTER
RN tried to contact patient - patient was driving and stated will try again later. Hypoglycemia    Onset of hypoglycemia:     BG levels (RN only: please print out CGM and/or pump report if patient is wearing one): Symptoms (RN only: dizziness, diaphoresis, shaky hands, tachycardia):     Rule of 15 discussed w/ patient:    Pattern of hypoglycemia (occurring mostly when/random?):    Change in Diet (RN only: change in appetite/eating/skipping meals):    List Medications/Compliance: For patient on pump therapy:     When are they giving their bolus each meal before or after? Over-correction (when was the last insulin administration and how much)?

## 2023-09-19 RX ORDER — PIOGLITAZONEHYDROCHLORIDE 30 MG/1
30 TABLET ORAL DAILY
Qty: 90 TABLET | Refills: 0 | Status: SHIPPED | OUTPATIENT
Start: 2023-09-19

## 2023-09-20 ENCOUNTER — TELEPHONE (OUTPATIENT)
Dept: INTERNAL MEDICINE CLINIC | Facility: CLINIC | Age: 68
End: 2023-09-20

## 2023-09-20 NOTE — TELEPHONE ENCOUNTER
Patient states that a cough started about 2 weeks ago. She denies any difficulty breathing, chest pain, or fevers at this time. She is requesting prescription for [employer].     Appointment scheduled:  Future Appointments   Date Time Provider Kirstin Hebert   9/25/2023  1:40 PM MD SANDRO Mi EC ADO   10/23/2023  3:20 PM MD SANDRO Mi EC ADO   11/27/2023 11:30 AM GISELA Luciano  ADO

## 2023-09-20 NOTE — TELEPHONE ENCOUNTER
Pt requesting refill of:  benzonatate 200 MG Oral Cap 60 capsule   Send to [facility]

## 2023-09-20 NOTE — TELEPHONE ENCOUNTER
benzonatate 200 MG Oral Cap 60 capsule 0 4/28/2023     Sig - Route: Take 1 capsule (200 mg total) by mouth 3 (three) times daily as needed for cough. - Oral    Sent to pharmacy as: Benzonatate 200 MG Oral Capsule (Tessalon)    E-Prescribing Status: Receipt confirmed by pharmacy (4/28/2023 12:18 PM CDT)    [Rx above was prescribed at last visit in office on 4/28/23 from visit in office for cough]    I called patient to perform RN Triage assessment for need for above Rx, as it is not a maintenance medication, and Left message to call back [per CHAPARRITA no detailed message]. [employer] message also sent to patient.  [1st attempt]

## 2023-09-25 ENCOUNTER — OFFICE VISIT (OUTPATIENT)
Dept: INTERNAL MEDICINE CLINIC | Facility: CLINIC | Age: 68
End: 2023-09-25

## 2023-09-25 VITALS
BODY MASS INDEX: 33.37 KG/M2 | HEIGHT: 58 IN | WEIGHT: 159 LBS | SYSTOLIC BLOOD PRESSURE: 130 MMHG | DIASTOLIC BLOOD PRESSURE: 76 MMHG | HEART RATE: 99 BPM

## 2023-09-25 DIAGNOSIS — E78.5 HYPERLIPIDEMIA, UNSPECIFIED HYPERLIPIDEMIA TYPE: ICD-10-CM

## 2023-09-25 DIAGNOSIS — I10 ESSENTIAL HYPERTENSION WITH GOAL BLOOD PRESSURE LESS THAN 130/80: ICD-10-CM

## 2023-09-25 DIAGNOSIS — R05.1 ACUTE COUGH: Primary | ICD-10-CM

## 2023-09-25 DIAGNOSIS — R09.82 POST-NASAL DRIP: ICD-10-CM

## 2023-09-25 PROCEDURE — 3075F SYST BP GE 130 - 139MM HG: CPT | Performed by: NURSE PRACTITIONER

## 2023-09-25 PROCEDURE — 3078F DIAST BP <80 MM HG: CPT | Performed by: NURSE PRACTITIONER

## 2023-09-25 PROCEDURE — 3008F BODY MASS INDEX DOCD: CPT | Performed by: NURSE PRACTITIONER

## 2023-09-25 PROCEDURE — 99214 OFFICE O/P EST MOD 30 MIN: CPT | Performed by: NURSE PRACTITIONER

## 2023-09-25 RX ORDER — ROSUVASTATIN CALCIUM 10 MG/1
10 TABLET, COATED ORAL EVERY EVENING
Qty: 90 TABLET | Refills: 3 | Status: SHIPPED | OUTPATIENT
Start: 2023-09-25

## 2023-09-25 RX ORDER — BENZONATATE 200 MG/1
200 CAPSULE ORAL 3 TIMES DAILY PRN
Qty: 60 CAPSULE | Refills: 0 | Status: SHIPPED | OUTPATIENT
Start: 2023-09-25

## 2023-09-25 RX ORDER — FLUTICASONE PROPIONATE 50 MCG
2 SPRAY, SUSPENSION (ML) NASAL DAILY
Qty: 15.8 ML | Refills: 0 | Status: SHIPPED | OUTPATIENT
Start: 2023-09-25 | End: 2024-09-19

## 2023-10-03 ENCOUNTER — TELEPHONE (OUTPATIENT)
Dept: ENDOCRINOLOGY CLINIC | Facility: CLINIC | Age: 68
End: 2023-10-03

## 2023-10-03 NOTE — TELEPHONE ENCOUNTER
ANA [address] [place] [address] Jhonny Whitaker,     Please advise    RTC: 11/27/23    Hypoglycemia    Onset of hypoglycemia: 2 weeks ago     BG levels:   2am: 50s- ate granola bar and water, she felt better   Am: 100s   Now: 93   Will eat dinner around 7pm     10/2:   Am: 100  Pm: 100    10/1:  Am: 100  Pm: 79  Had symptoms of low blood sugar during the day     9/30:  Am: 110  Pm: 79  Had symptoms of low blood sugar during the day     9/29:   Am: 100  Pm: 79      Symptoms: sweating, mild shakiness,     Rule of 15 discussed w/ patient: granola bar, banana, OJ, , understands rules of 15 and when to check BG     Pattern of hypoglycemia: middle of the night and Saturday and Sunday during the day    Change in Diet (RN only: change in appetite/eating/skipping meals): no changes     List Medications/Compliance:     -continue with Glimepiride 4 mg with breakfast (2 tabs)  - stop Glimepiride in PM                                 -continue with Metformin 1,000mg with breakfast                                                      1,000mg with dinner  - continue with Pioglitazone 30mg once daily in AM

## 2023-10-03 NOTE — TELEPHONE ENCOUNTER
Patient is calling states that her sugar is dropping low to 50s.  Please call states that she starts sweating

## 2023-10-03 NOTE — TELEPHONE ENCOUNTER
[id number] Shara Self noted. Thank you for the update. Please ask patient to decrease glimepiride to 2mg (1 tablet in AM). Keep all other medications the same. If she continues to have low glucose readings to call back and notify me. Thank you!

## 2023-10-23 ENCOUNTER — OFFICE VISIT (OUTPATIENT)
Dept: INTERNAL MEDICINE CLINIC | Facility: CLINIC | Age: 68
End: 2023-10-23

## 2023-10-23 VITALS
HEIGHT: 58.5 IN | BODY MASS INDEX: 31.87 KG/M2 | HEART RATE: 86 BPM | WEIGHT: 156 LBS | SYSTOLIC BLOOD PRESSURE: 126 MMHG | DIASTOLIC BLOOD PRESSURE: 68 MMHG

## 2023-10-23 DIAGNOSIS — E78.5 HYPERLIPIDEMIA, UNSPECIFIED HYPERLIPIDEMIA TYPE: ICD-10-CM

## 2023-10-23 DIAGNOSIS — E11.69 TYPE 2 DIABETES MELLITUS WITH OTHER SPECIFIED COMPLICATION, WITHOUT LONG-TERM CURRENT USE OF INSULIN (HCC): ICD-10-CM

## 2023-10-23 DIAGNOSIS — Z12.31 VISIT FOR SCREENING MAMMOGRAM: ICD-10-CM

## 2023-10-23 DIAGNOSIS — Z78.0 POSTMENOPAUSAL: ICD-10-CM

## 2023-10-23 DIAGNOSIS — R09.82 POST-NASAL DRIP: ICD-10-CM

## 2023-10-23 DIAGNOSIS — Z00.00 PHYSICAL EXAM: Primary | ICD-10-CM

## 2023-10-23 PROCEDURE — 3008F BODY MASS INDEX DOCD: CPT | Performed by: INTERNAL MEDICINE

## 2023-10-23 PROCEDURE — 3074F SYST BP LT 130 MM HG: CPT | Performed by: INTERNAL MEDICINE

## 2023-10-23 PROCEDURE — 3078F DIAST BP <80 MM HG: CPT | Performed by: INTERNAL MEDICINE

## 2023-10-23 PROCEDURE — 99397 PER PM REEVAL EST PAT 65+ YR: CPT | Performed by: INTERNAL MEDICINE

## 2023-10-23 RX ORDER — FLUTICASONE PROPIONATE 50 MCG
2 SPRAY, SUSPENSION (ML) NASAL DAILY
Qty: 15.8 ML | Refills: 5 | Status: SHIPPED | OUTPATIENT
Start: 2023-10-23 | End: 2024-10-17

## 2023-10-25 NOTE — TELEPHONE ENCOUNTER
Rn called patient to follow up and  patient said a nurse had called her and told her not to take glimepiride, so she discontinued. Patient denies any symptoms of dizziness. Or low blood sugars since discontinued glimepiride. Unable to provide readings since she didn't have record with her. Also dr Umaña Early stopped the lisinopril for 2 weeks due to patient co of cough.    Has vincent 11/27 with you  Jose David Quiroga

## 2023-10-25 NOTE — TELEPHONE ENCOUNTER
[id number] Shara Self noted, I am happy to hear that she is no longer having hypoglycemia. No further action is needed. Thank you!

## 2023-10-30 DIAGNOSIS — R05.1 ACUTE COUGH: ICD-10-CM

## 2023-10-31 RX ORDER — BENZONATATE 200 MG/1
200 CAPSULE ORAL 3 TIMES DAILY PRN
Qty: 60 CAPSULE | Refills: 0 | OUTPATIENT
Start: 2023-10-31

## 2023-11-01 RX ORDER — AMLODIPINE BESYLATE 10 MG/1
10 TABLET ORAL DAILY
Qty: 90 TABLET | Refills: 3 | Status: SHIPPED | OUTPATIENT
Start: 2023-11-01

## 2023-11-01 NOTE — TELEPHONE ENCOUNTER
Please review refill protocol failed/ no protocol  Requested Prescriptions   Pending Prescriptions Disp Refills    AMLODIPINE 10 MG Oral Tab [Pharmacy Med Name: amLODIPine Besylate 10 MG Oral Tablet] 90 tablet 0     Sig: Take 1 tablet by mouth once daily       Hypertensive Medications Protocol Failed - 10/30/2023 12:26 PM        Failed - CMP or BMP in past 6 months     No results found for this or any previous visit (from the past 4392 hour(s)).             Failed - EGFRCR or GFRNAA > 50     GFR Evaluation            Passed - In person appointment in the past 12 or next 3 months     Recent Outpatient Visits              1 week ago Physical exam    Elza Culver MD    Office Visit    1 month ago Acute cough    [facility], [address] [facility], APRN    Office Visit    2 months ago Type 2 diabetes mellitus with both eyes affected by mild nonproliferative retinopathy without macular edema, without long-term current use of insulin ([address]    [address], [address], [place], GISELA    Office Visit    4 months ago Type 2 diabetes mellitus with both eyes affected by mild nonproliferative retinopathy without macular edema, without long-term current use of insulin ([address]    [address], [place], GISELA Swartz    Office Visit    5 months ago Primary osteoarthritis of right knee    [address] [place], [place] [address]Fco MD    Office Visit          Future Appointments         Provider Department Appt Notes    In 3 weeks GISELA Coyle [address] [place] [address] follow up (policy informed)                      Passed - Last BP reading less than 140/90     BP Readings from Last 1 Encounters:  10/23/23 : 126/68              Passed - In person appointment or virtual visit in the past 6 months     Recent Outpatient Visits              1 week ago Physical exam    Yojana Kinney MD    Office Visit    1 month ago Acute cough    [facility], [address] [place], [facility] [place], APRN    Office Visit    2 months ago Type 2 diabetes mellitus with both eyes affected by mild nonproliferative retinopathy without macular edema, without long-term current use of insulin ([address]    [address], [address], [place], APRADRIAN    Office Visit    4 months ago Type 2 diabetes mellitus with both eyes affected by mild nonproliferative retinopathy without macular edema, without long-term current use of insulin [facility])    [address], [place], GISELA Fan    Office Visit    5 months ago Primary osteoarthritis of right knee    Venkata Hall Freda Clack, MD    Office Visit          Future Appointments         Provider Department Appt Notes    In 3 weeks GISELA Walker [place] follow up (policy informed)

## 2023-11-14 ENCOUNTER — TELEPHONE (OUTPATIENT)
Dept: ENDOCRINOLOGY CLINIC | Facility: CLINIC | Age: 68
End: 2023-11-14

## 2023-11-14 ENCOUNTER — LAB ENCOUNTER (OUTPATIENT)
Dept: LAB | Age: 68
End: 2023-11-14
Attending: NURSE PRACTITIONER
Payer: COMMERCIAL

## 2023-11-14 LAB
ALBUMIN SERPL-MCNC: 4.4 G/DL (ref 3.2–4.8)
ALBUMIN/GLOB SERPL: 1.5 {RATIO} (ref 1–2)
ALP LIVER SERPL-CCNC: 56 U/L
ALT SERPL-CCNC: 12 U/L
ANION GAP SERPL CALC-SCNC: 7 MMOL/L (ref 0–18)
AST SERPL-CCNC: 18 U/L (ref ?–34)
BILIRUB SERPL-MCNC: 0.4 MG/DL (ref 0.2–1.1)
BILIRUB UR QL: NEGATIVE
BUN BLD-MCNC: 11 MG/DL (ref 9–23)
BUN/CREAT SERPL: 13.3 (ref 10–20)
CALCIUM BLD-MCNC: 9.3 MG/DL (ref 8.7–10.4)
CHLORIDE SERPL-SCNC: 99 MMOL/L (ref 98–112)
CHOLEST SERPL-MCNC: 153 MG/DL (ref ?–200)
CLARITY UR: CLEAR
CO2 SERPL-SCNC: 28 MMOL/L (ref 21–32)
CREAT BLD-MCNC: 0.83 MG/DL
CREAT UR-SCNC: 30.7 MG/DL
DEPRECATED RDW RBC AUTO: 41.4 FL (ref 35.1–46.3)
EGFRCR SERPLBLD CKD-EPI 2021: 76 ML/MIN/1.73M2 (ref 60–?)
ERYTHROCYTE [DISTWIDTH] IN BLOOD BY AUTOMATED COUNT: 11.9 % (ref 11–15)
FASTING PATIENT LIPID ANSWER: YES
FASTING STATUS PATIENT QL REPORTED: YES
GLOBULIN PLAS-MCNC: 3 G/DL (ref 2.8–4.4)
GLUCOSE BLD-MCNC: 130 MG/DL (ref 70–99)
GLUCOSE UR-MCNC: NORMAL MG/DL
HCT VFR BLD AUTO: 39.3 %
HDLC SERPL-MCNC: 74 MG/DL (ref 40–59)
HGB BLD-MCNC: 12.5 G/DL
HGB UR QL STRIP.AUTO: NEGATIVE
KETONES UR-MCNC: NEGATIVE MG/DL
LDLC SERPL CALC-MCNC: 65 MG/DL (ref ?–100)
LEUKOCYTE ESTERASE UR QL STRIP.AUTO: 500
MCH RBC QN AUTO: 29.7 PG (ref 26–34)
MCHC RBC AUTO-ENTMCNC: 31.8 G/DL (ref 31–37)
MCV RBC AUTO: 93.3 FL
MICROALBUMIN UR-MCNC: 3.7 MG/DL
MICROALBUMIN/CREAT 24H UR-RTO: 120.5 UG/MG (ref ?–30)
NITRITE UR QL STRIP.AUTO: NEGATIVE
NONHDLC SERPL-MCNC: 79 MG/DL (ref ?–130)
OSMOLALITY SERPL CALC.SUM OF ELEC: 279 MOSM/KG (ref 275–295)
PH UR: 7 [PH] (ref 5–8)
PLATELET # BLD AUTO: 303 10(3)UL (ref 150–450)
POTASSIUM SERPL-SCNC: 4.9 MMOL/L (ref 3.5–5.1)
PROT SERPL-MCNC: 7.4 G/DL (ref 5.7–8.2)
PROT UR-MCNC: NEGATIVE MG/DL
RBC # BLD AUTO: 4.21 X10(6)UL
SODIUM SERPL-SCNC: 134 MMOL/L (ref 136–145)
SP GR UR STRIP: 1.01 (ref 1–1.03)
T4 FREE SERPL-MCNC: 1.5 NG/DL (ref 0.8–1.7)
TRIGL SERPL-MCNC: 69 MG/DL (ref 30–149)
TSI SER-ACNC: 1.82 MIU/ML (ref 0.55–4.78)
UROBILINOGEN UR STRIP-ACNC: NORMAL
VLDLC SERPL CALC-MCNC: 10 MG/DL (ref 0–30)
WBC # BLD AUTO: 5.2 X10(3) UL (ref 4–11)
WBC #/AREA URNS AUTO: >50 /HPF
WBC CLUMPS UR QL AUTO: PRESENT /HPF

## 2023-11-14 PROCEDURE — 3061F NEG MICROALBUMINURIA REV: CPT | Performed by: NURSE PRACTITIONER

## 2023-11-14 PROCEDURE — 84439 ASSAY OF FREE THYROXINE: CPT | Performed by: NURSE PRACTITIONER

## 2023-11-14 PROCEDURE — 81001 URINALYSIS AUTO W/SCOPE: CPT | Performed by: INTERNAL MEDICINE

## 2023-11-14 PROCEDURE — 36415 COLL VENOUS BLD VENIPUNCTURE: CPT | Performed by: INTERNAL MEDICINE

## 2023-11-14 PROCEDURE — 82570 ASSAY OF URINE CREATININE: CPT | Performed by: NURSE PRACTITIONER

## 2023-11-14 PROCEDURE — 82043 UR ALBUMIN QUANTITATIVE: CPT | Performed by: NURSE PRACTITIONER

## 2023-11-14 PROCEDURE — 85027 COMPLETE CBC AUTOMATED: CPT | Performed by: INTERNAL MEDICINE

## 2023-11-14 PROCEDURE — 3060F POS MICROALBUMINURIA REV: CPT | Performed by: NURSE PRACTITIONER

## 2023-11-14 PROCEDURE — 84443 ASSAY THYROID STIM HORMONE: CPT | Performed by: NURSE PRACTITIONER

## 2023-11-14 PROCEDURE — 80053 COMPREHEN METABOLIC PANEL: CPT | Performed by: NURSE PRACTITIONER

## 2023-11-14 PROCEDURE — 80061 LIPID PANEL: CPT | Performed by: NURSE PRACTITIONER

## 2023-11-15 NOTE — TELEPHONE ENCOUNTER
Called patient, no answer, left VM I received a refill request for this patient, but the patient has not been seen in 4 months, and the patient will need to schedule follow-up appointment before I can send in any further refills.  If someone could try and reach out to the patient to make sure the patient is still planning to receive care at our clinic, and to help her get a follow-up appointment scheduled if she does, and then refills can be sent in.  Thanks.

## 2023-11-16 ENCOUNTER — LAB ENCOUNTER (OUTPATIENT)
Dept: LAB | Age: 68
End: 2023-11-16
Attending: INTERNAL MEDICINE
Payer: COMMERCIAL

## 2023-11-16 ENCOUNTER — HOSPITAL ENCOUNTER (OUTPATIENT)
Dept: BONE DENSITY | Age: 68
Discharge: HOME | End: 2023-11-16
Attending: INTERNAL MEDICINE
Payer: COMMERCIAL

## 2023-11-16 DIAGNOSIS — R31.9 HEMATURIA, UNSPECIFIED TYPE: ICD-10-CM

## 2023-11-16 DIAGNOSIS — Z78.0 POSTMENOPAUSAL: ICD-10-CM

## 2023-11-16 DIAGNOSIS — R31.9 HEMATURIA, UNSPECIFIED TYPE: Primary | ICD-10-CM

## 2023-11-16 LAB
BILIRUB UR QL: NEGATIVE
CLARITY UR: CLEAR
GLUCOSE UR-MCNC: 300 MG/DL
HGB UR QL STRIP.AUTO: NEGATIVE
KETONES UR-MCNC: NEGATIVE MG/DL
LEUKOCYTE ESTERASE UR QL STRIP.AUTO: 500
NITRITE UR QL STRIP.AUTO: NEGATIVE
PH UR: 5.5 [PH] (ref 5–8)
PROT UR-MCNC: NEGATIVE MG/DL
SP GR UR STRIP: 1.01 (ref 1–1.03)
UROBILINOGEN UR STRIP-ACNC: NORMAL

## 2023-11-16 PROCEDURE — 87186 SC STD MICRODIL/AGAR DIL: CPT

## 2023-11-16 PROCEDURE — 81001 URINALYSIS AUTO W/SCOPE: CPT

## 2023-11-16 PROCEDURE — 77080 DXA BONE DENSITY AXIAL: CPT | Performed by: INTERNAL MEDICINE

## 2023-11-16 PROCEDURE — 87086 URINE CULTURE/COLONY COUNT: CPT

## 2023-11-16 PROCEDURE — 87077 CULTURE AEROBIC IDENTIFY: CPT

## 2023-11-27 ENCOUNTER — LAB ENCOUNTER (OUTPATIENT)
Dept: LAB | Age: 68
End: 2023-11-27
Attending: NURSE PRACTITIONER
Payer: COMMERCIAL

## 2023-11-27 ENCOUNTER — TELEPHONE (OUTPATIENT)
Dept: PEDIATRICS CLINIC | Facility: CLINIC | Age: 68
End: 2023-11-27

## 2023-11-27 ENCOUNTER — OFFICE VISIT (OUTPATIENT)
Dept: ENDOCRINOLOGY CLINIC | Facility: CLINIC | Age: 68
End: 2023-11-27

## 2023-11-27 VITALS
BODY MASS INDEX: 32.54 KG/M2 | DIASTOLIC BLOOD PRESSURE: 76 MMHG | HEART RATE: 94 BPM | SYSTOLIC BLOOD PRESSURE: 144 MMHG | HEIGHT: 58 IN | WEIGHT: 155 LBS

## 2023-11-27 DIAGNOSIS — E55.9 VITAMIN D DEFICIENCY: ICD-10-CM

## 2023-11-27 DIAGNOSIS — E11.3293 TYPE 2 DIABETES MELLITUS WITH BOTH EYES AFFECTED BY MILD NONPROLIFERATIVE RETINOPATHY WITHOUT MACULAR EDEMA, WITHOUT LONG-TERM CURRENT USE OF INSULIN (HCC): Primary | ICD-10-CM

## 2023-11-27 LAB
CARTRIDGE LOT#: ABNORMAL NUMERIC
GLUCOSE BLOOD: 217
HEMOGLOBIN A1C: 7.4 % (ref 4.3–5.6)
TEST STRIP LOT #: NORMAL NUMERIC
VIT D+METAB SERPL-MCNC: 28 NG/ML (ref 30–100)

## 2023-11-27 PROCEDURE — 82306 VITAMIN D 25 HYDROXY: CPT

## 2023-11-27 PROCEDURE — 99214 OFFICE O/P EST MOD 30 MIN: CPT | Performed by: NURSE PRACTITIONER

## 2023-11-27 PROCEDURE — 3008F BODY MASS INDEX DOCD: CPT | Performed by: NURSE PRACTITIONER

## 2023-11-27 PROCEDURE — 36415 COLL VENOUS BLD VENIPUNCTURE: CPT

## 2023-11-27 PROCEDURE — 83036 HEMOGLOBIN GLYCOSYLATED A1C: CPT | Performed by: NURSE PRACTITIONER

## 2023-11-27 PROCEDURE — 3077F SYST BP >= 140 MM HG: CPT | Performed by: NURSE PRACTITIONER

## 2023-11-27 PROCEDURE — 3051F HG A1C>EQUAL 7.0%<8.0%: CPT | Performed by: NURSE PRACTITIONER

## 2023-11-27 PROCEDURE — 3078F DIAST BP <80 MM HG: CPT | Performed by: NURSE PRACTITIONER

## 2023-11-27 PROCEDURE — 82947 ASSAY GLUCOSE BLOOD QUANT: CPT | Performed by: NURSE PRACTITIONER

## 2023-11-27 NOTE — PATIENT INSTRUCTIONS
A1C: 7.4% today --> previously was 7.3% on 8/30/2023  Blood glucose: 217 in clinic today    Medications:               -continue with Metformin 1,000mg with breakfast       ---> take 500mg on days that you are exercising                  1,000mg with dinner  - continue with Pioglitazone 30mg once daily in AM     Start taking Calcium 600mg daily - from over the counter   - repeat vitamin d lab level today       Follow up in 2 weeks to review blood glucose readings       Weight:  Wt Readings from Last 6 Encounters:   11/27/23 155 lb (70.3 kg)   10/23/23 156 lb (70.8 kg)   09/25/23 159 lb (72.1 kg)   08/30/23 147 lb (66.7 kg)   06/28/23 144 lb (65.3 kg)   05/25/23 146 lb (66.2 kg)     A1C goal:  <7.5%    Blood sugar testing:  Test your blood sugar 1 time daily   Recommended times to test: Before breakfast (fasting) or 2hrs after meals     Blood sugar targets:  Before breakfast:  (preferably < 110)  Before meals OR 2 hours after meals: <180 (preferably <150)     Call for persistent blood sugars < 75 or > 200

## 2023-11-29 ENCOUNTER — TELEPHONE (OUTPATIENT)
Dept: ENDOCRINOLOGY CLINIC | Facility: CLINIC | Age: 68
End: 2023-11-29

## 2023-11-29 NOTE — TELEPHONE ENCOUNTER
Patient is calling to ask if she can get the vitamin d prescription sent to pharmacy. Please call

## 2023-11-30 NOTE — TELEPHONE ENCOUNTER
Called patient, no answer.  Left message to call back.   Per result note under lab tab, patient is to take vitamin D 2000 units everyday, which is OTC. RX is not needed unless patient is truly wanting a prescription for this and maybe insurance will still pay.  Will clarify request with patient when she calls back.

## 2023-12-11 ENCOUNTER — TELEPHONE (OUTPATIENT)
Dept: ENDOCRINOLOGY CLINIC | Facility: CLINIC | Age: 68
End: 2023-12-11

## 2023-12-11 ENCOUNTER — NURSE ONLY (OUTPATIENT)
Dept: ENDOCRINOLOGY CLINIC | Facility: CLINIC | Age: 68
End: 2023-12-11

## 2023-12-11 DIAGNOSIS — E11.3293 TYPE 2 DIABETES MELLITUS WITH BOTH EYES AFFECTED BY MILD NONPROLIFERATIVE RETINOPATHY WITHOUT MACULAR EDEMA, WITHOUT LONG-TERM CURRENT USE OF INSULIN (HCC): Primary | ICD-10-CM

## 2023-12-11 NOTE — TELEPHONE ENCOUNTER
Pt was calling to confirm appointment for 12/13. She states the appointment was suppose to be in [place]. She states [place] is too far and is unable to drive to that office.   Please call

## 2023-12-11 NOTE — TELEPHONE ENCOUNTER
Kavitha Arteaga,    Pt seen for [employer] today. Report placed in your folder. We reviewed nutrition as that seem to contribute to some of the higher blood sugars. Pt is aware you out of office till Monday 12/18. I let her know that you would review it upon return and she would be getting a phone call from staff with your recommendations.     Current Regimen:  Pioglitazone 30mg daily (AM)  Metformin 1000mg BID (except on exercise days, 500mg with breakfast)

## 2023-12-11 NOTE — PROGRESS NOTES
Continuous Glucose Monitor Download     Start Time: 2:33pm  End Time: 3:20pm     Indication: Pt is T2D followed by Kavitha Arteaga. At last appointment MI 11/27/2023, advised to decrease MTF AM dose with exercise. Additionally, a Nelta Curet was placed on pt to monitor hypoglycemia. Appointment today for download for Kavitha Arteaga.      A1c: 7.4% (11/27/2023)     Current Diabetes Medication:  Pioglitazone 30mg daily (AM)  Metformin 1000mg BID (except on exercise days, 500mg with breakfast)     Sensor Type: LibrePro     Reviewed CGMS download and patient logs:  Days of sensor use: 11/27/23-12/11/23  Average glucose (mg/dL): 206 mg/dl  Estimated GMI: 8.2%  Standard deviation =/- 27.4% (mg/dL)  Target Ranges:  mg/dL          39% of time in range  0% of time below range  61% of time above range        Interpretation:  - Blood sugar stable over night with fasting blood sugars 130-150's mg/dl  - Breakfast typically has quickest rise up to 250-300 mg/dl with post prandials in 120-180's mg/dl  - Dinner and lunch post prandials typically in 200-250 mg/dl  - No instances of low blood sugar    Recommended medication changes/Plan:  - Reviewed LibrePro download which demonstrates stable blood sugars over night but elevated blood sugars during the day  - We reviewed the last couple of days of nutrition where it was noted that oatmeal causes quickest rise in blood sugar to 250-300 mg/dl and then has a sudden drop which could be causes pt s/s of low blood sugar  - We reviewed in-depth diet such as focus on protein and limiting carbohydrates to fist size per meal. Pt does admit that most times she is eating more than a fist size of rice for lunch meal contributing to higher blood sugars  - Reviewed days where pt took 500mg or 1000mg of Metformin at breakfast (always 1000mg at dinner) in which there was not too much of a difference in blood sugar control   - Discussed that Kavitha Arteaga is currently out but in the meantime to work on nutrition as that should help with blood sugar control   - Report placed in [facility] folder for final review. Refer to TE 12/11/2023.  Pt prefers a phone call    Updated Diabetes Medication:       TOSHIA HIGGINS  Pioglitazone 30mg daily (AM)  Metformin 1000mg BID (except on exercise days, 500mg with breakfast)    Follow up:  3/18/2024 [id number]

## 2023-12-11 NOTE — TELEPHONE ENCOUNTER
Called patient and states her sensor fell off this morning. She also cannot go to [address] [place] [address] location, which is where her appointment is currently scheduled. She is requesting for [facility] location. Added to CDE schedule today.

## 2023-12-14 ENCOUNTER — OFFICE VISIT (OUTPATIENT)
Dept: PODIATRY CLINIC | Facility: CLINIC | Age: 68
End: 2023-12-14

## 2023-12-14 DIAGNOSIS — M79.671 PAIN IN BOTH FEET: Primary | ICD-10-CM

## 2023-12-14 DIAGNOSIS — Q82.8 POROKERATOSIS: ICD-10-CM

## 2023-12-14 DIAGNOSIS — M79.672 PAIN IN BOTH FEET: Primary | ICD-10-CM

## 2023-12-14 PROCEDURE — 99203 OFFICE O/P NEW LOW 30 MIN: CPT | Performed by: STUDENT IN AN ORGANIZED HEALTH CARE EDUCATION/TRAINING PROGRAM

## 2023-12-14 NOTE — PROGRESS NOTES
[facility], [facility] Podiatry  Progress Note      Almaz Beavers is a 70year old female. Chief Complaint   Patient presents with    Foot Pain     Consult- bilateral foot- skin thickening - rates pain 4/10 only when putting pressure on it- last AIC=7.4 on 11/27/2023- FBS this QG=958- was seen by Dr. Richard Loving on 8/07/2019             HPI:     Pt is a pleasant 70year old female who PTC for flaco 5th metatarsal corns. Pt admits to pain with pressure especially when she works out    Allergies: Pcn [penicillins]    Current Outpatient Medications   Medication Sig Dispense Refill    metFORMIN 500 MG Oral Tab Take 2 tablets (1,000 mg total) by mouth 2 (two) times daily with meals. 360 tablet 0    nitrofurantoin monohydrate macro (MACROBID) 100 MG Oral Cap Take 1 capsule (100 mg total) by mouth 2 (two) times daily. 20 capsule 0    amLODIPine 10 MG Oral Tab Take 1 tablet (10 mg total) by mouth daily. 90 tablet 3    fluticasone propionate 50 MCG/ACT Nasal Suspension 2 sprays by Each Nare route daily. (Patient not taking: Reported on 11/27/2023) 15.8 mL 5    rosuvastatin 10 MG Oral Tab Take 1 tablet (10 mg total) by mouth every evening. 90 tablet 3    pioglitazone 30 MG Oral Tab Take 1 tablet (30 mg total) by mouth daily. 90 tablet 0    lisinopril 5 MG Oral Tab Take 1 tablet (5 mg total) by mouth daily. (Patient not taking: Reported on 11/27/2023) 90 tablet 0    ketoconazole 2 % External Cream Apply to affected area 2 times daily. 60 g 1    fluocinonide 0.05 % External Ointment Apply 1 Application topically 2 (two) times daily. 60 g 2    CLOTRIMAZOLE-BETAMETHASONE 1-0.05 % External Cream APPLY  CREAM TOPICALLY TWICE DAILY 15 g 0    loratadine 10 MG Oral Tab Take 1 tablet (10 mg total) by mouth daily.  (Patient not taking: Reported on 11/27/2023) 30 tablet 3    Glucose Blood (ACCU-CHEK BEATRICE PLUS) In Vitro Strip Check blood sugar twice daily 200 strip 0    Lancets Thin Does not apply Misc Check blood sugar twice daily 200 each 2 Blood Glucose Monitoring Suppl (ACCU-CHEK BEATRICE PLUS) w/Device Does not apply Kit Check blood sugar twice daily 1 kit 0    CALCIUM OR Take by mouth. (Patient not taking: Reported on 11/27/2023)      aspirin 81 MG Oral Tab Take 1 tablet (81 mg total) by mouth daily. Multiple Vitamins-Minerals (WOMENS MULTIVITAMIN PLUS) Oral Tab Take  by mouth. Past Medical History:   Diagnosis Date    Diabetes ([address]     Elevated lipids     Hyperlipidemia    High blood pressure     High cholesterol       Past Surgical History:   Procedure Laterality Date    COLONOSCOPY N/A 08/10/2021    Procedure: COLONOSCOPY;  Surgeon: Earlean Harada, MD;  [facility] ENDOSCOPY    COLONOSCOPY & POLYPECTOMY      ELECTROCARDIOGRAM, COMPLETE  06/05/2014    Scanned to Media Tab    HYSTERECTOMY      age 42      Family History   Problem Relation Age of Onset    Uterine Cancer Mother         Cause of Death    Pulmonary Disease Father 68        COPD Cause of Death    Heart Disorder Brother 46        Congestive Heart Failure      Social History     Socioeconomic History    Marital status:    Tobacco Use    Smoking status: Never     Passive exposure: Never    Smokeless tobacco: Never   Vaping Use    Vaping Use: Never used   Substance and Sexual Activity    Alcohol use: Yes     Comment: 1-2 glasses yearly    Drug use: No   Other Topics Concern    Caffeine Concern Yes     Comment: 2 cups tea/coffee daily    Pt has a pacemaker No    Pt has a defibrillator No    Reaction to local anesthetic No           REVIEW OF SYSTEMS:     Denies nause, fever, chills  No calf pain  Denies chest pain or SOB      EXAM:   There were no vitals taken for this visit. GENERAL: well developed, well nourished, in no apparent distress  EXTREMITIES:   1. Integument: Normal skin temperature and turgor. HPK tissue with deep nucleated cores on the plantar aspect of flaco 5th metatarsal   2.  Vascular: Dorsalis pedis two out of four bilateral and posterior tibial pulses two out of   four bilateral, capillary refill normal.   3. Musculoskeletal: All muscle groups are graded 5 out of 5 in the foot and ankle. Tenderness to corns sites. 4. Neurological: Normal sharp dull sensation; reflexes normal.             ASSESSMENT AND PLAN:   Diagnoses and all orders for this visit:    Pain in both feet    Porokeratosis        Plan:       -Patient examined, chart history reviewed. -Discussed importance of proper pedal hygiene and regular foot checks  -Pared HPKs with extraction of deep nucleated cored using sterile #15 blade without incident.    -Ambulate with supportive shoes and inserts and avoid walking barefoot.  -Educated patient on acute signs of infection advised patient to seek immediate medical attention if symptoms arise. RTC in 1 month       The patient indicates understanding of these issues and agrees to the plan.         Neto Robison DPM

## 2023-12-19 ENCOUNTER — OFFICE VISIT (OUTPATIENT)
Dept: OTOLARYNGOLOGY | Facility: CLINIC | Age: 68
End: 2023-12-19

## 2023-12-19 VITALS — HEIGHT: 59 IN | BODY MASS INDEX: 29.64 KG/M2 | WEIGHT: 147 LBS

## 2023-12-19 DIAGNOSIS — H61.23 BILATERAL IMPACTED CERUMEN: Primary | ICD-10-CM

## 2023-12-19 PROCEDURE — 3008F BODY MASS INDEX DOCD: CPT | Performed by: OTOLARYNGOLOGY

## 2023-12-19 PROCEDURE — 69210 REMOVE IMPACTED EAR WAX UNI: CPT | Performed by: OTOLARYNGOLOGY

## 2024-01-11 NOTE — TELEPHONE ENCOUNTER
Please see 12/11/23 TE, a message was left for pt on 12/22/23 and no call back has been received.   Encounter closed.

## 2024-02-12 ENCOUNTER — OFFICE VISIT (OUTPATIENT)
Dept: INTERNAL MEDICINE CLINIC | Facility: CLINIC | Age: 69
End: 2024-02-12

## 2024-02-12 VITALS
WEIGHT: 155 LBS | HEART RATE: 83 BPM | HEIGHT: 59 IN | SYSTOLIC BLOOD PRESSURE: 141 MMHG | DIASTOLIC BLOOD PRESSURE: 73 MMHG | OXYGEN SATURATION: 97 % | BODY MASS INDEX: 31.25 KG/M2 | TEMPERATURE: 97 F

## 2024-02-12 DIAGNOSIS — J20.9 ACUTE BRONCHITIS, UNSPECIFIED ORGANISM: Primary | ICD-10-CM

## 2024-02-12 DIAGNOSIS — R09.82 POST-NASAL DRIP: ICD-10-CM

## 2024-02-12 DIAGNOSIS — J01.90 ACUTE NON-RECURRENT SINUSITIS, UNSPECIFIED LOCATION: ICD-10-CM

## 2024-02-12 RX ORDER — DOXYCYCLINE HYCLATE 100 MG/1
100 CAPSULE ORAL 2 TIMES DAILY WITH MEALS
Qty: 20 CAPSULE | Refills: 0 | Status: SHIPPED | OUTPATIENT
Start: 2024-02-12 | End: 2024-02-22

## 2024-02-12 RX ORDER — FLUTICASONE PROPIONATE 50 MCG
2 SPRAY, SUSPENSION (ML) NASAL DAILY
Qty: 15.8 ML | Refills: 5 | Status: SHIPPED | OUTPATIENT
Start: 2024-02-12 | End: 2025-02-06

## 2024-02-12 RX ORDER — BENZONATATE 200 MG/1
200 CAPSULE ORAL 3 TIMES DAILY PRN
Qty: 90 CAPSULE | Refills: 0 | Status: SHIPPED | OUTPATIENT
Start: 2024-02-12 | End: 2024-03-13

## 2024-02-12 RX ORDER — PIOGLITAZONEHYDROCHLORIDE 30 MG/1
30 TABLET ORAL DAILY
Qty: 90 TABLET | Refills: 0 | Status: SHIPPED | OUTPATIENT
Start: 2024-02-12

## 2024-02-12 RX ORDER — METHYLPREDNISOLONE 4 MG/1
TABLET ORAL
Qty: 21 EACH | Refills: 0 | Status: SHIPPED | OUTPATIENT
Start: 2024-02-12

## 2024-02-12 NOTE — TELEPHONE ENCOUNTER
LOV: 11/27/23    RTC: 3 Months    FU: 3/18/24    Last Refill: 9/19/23    3 Month Supply Pending

## 2024-02-12 NOTE — PROGRESS NOTES
OFFICE NOTE     Patient ID: Rossy Rogers is a 68 year old female.  Today's Date: 02/12/24  Chief Complaint: Cough (2 weeks)  Pt is a 67y/o female with DM (followed by Lary HIGGINS), HTN, HLD, (cerumen impaction followed by Dr. Nixon 12/2023), bilateral foot pain/Porokeratosis (seen by Podiatry Dr. Santiago) presents to clinic for cough for past 2 weeks.    Cough  This is a new problem. The current episode started 1 to 4 weeks ago. The problem has been gradually worsening. The problem occurs constantly. The cough is Productive of purulent sputum and productive of sputum. Associated symptoms include shortness of breath.         Vitals:    02/12/24 1438 02/12/24 1444   BP: 155/74 141/73   Pulse: 85 83   Temp: 97.3 °F (36.3 °C)    TempSrc: Oral    SpO2: 97%    Weight: 155 lb (70.3 kg)    Height: 4' 11\" (1.499 m)      body mass index is 31.31 kg/m².  BP Readings from Last 3 Encounters:   02/12/24 141/73   11/27/23 144/76   10/23/23 126/68     The 10-year ASCVD risk score (Lucian WOODRUFF, et al., 2019) is: 21.3%    Values used to calculate the score:      Age: 68 years      Sex: Female      Is Non- : No      Diabetic: Yes      Tobacco smoker: No      Systolic Blood Pressure: 141 mmHg      Is BP treated: Yes      HDL Cholesterol: 74 mg/dL      Total Cholesterol: 153 mg/dL      Medications reviewed:  Current Outpatient Medications   Medication Sig Dispense Refill    pioglitazone 30 MG Oral Tab Take 1 tablet (30 mg total) by mouth daily. 90 tablet 0    benzonatate 200 MG Oral Cap Take 1 capsule (200 mg total) by mouth 3 (three) times daily as needed for cough (FOR COUGH). 90 capsule 0    Dextromethorphan-Benzocaine 5-7.5 MG Mouth/Throat Lozenge Use as directed 1 lozenge in the mouth or throat every 4 to 6 hours as needed. 20 lozenge 0    methylPREDNISolone (MEDROL) 4 MG Oral Tablet Therapy Pack As directed. 21 each 0    fluticasone propionate 50 MCG/ACT Nasal Suspension 2  sprays by Each Nare route daily. 15.8 mL 5    doxycycline 100 MG Oral Cap Take 1 capsule (100 mg total) by mouth 2 (two) times daily with meals for 10 days. 20 capsule 0    metFORMIN 500 MG Oral Tab Take 2 tablets (1,000 mg total) by mouth 2 (two) times daily with meals. 360 tablet 0    amLODIPine 10 MG Oral Tab Take 1 tablet (10 mg total) by mouth daily. 90 tablet 3    rosuvastatin 10 MG Oral Tab Take 1 tablet (10 mg total) by mouth every evening. 90 tablet 3    lisinopril 5 MG Oral Tab Take 1 tablet (5 mg total) by mouth daily. 90 tablet 0    loratadine 10 MG Oral Tab Take 1 tablet (10 mg total) by mouth daily. 30 tablet 3    CALCIUM OR Take by mouth.      aspirin 81 MG Oral Tab Take 1 tablet (81 mg total) by mouth daily.      Multiple Vitamins-Minerals (WOMENS MULTIVITAMIN PLUS) Oral Tab Take  by mouth.      nitrofurantoin monohydrate macro (MACROBID) 100 MG Oral Cap Take 1 capsule (100 mg total) by mouth 2 (two) times daily. (Patient not taking: Reported on 2/12/2024) 20 capsule 0    ketoconazole 2 % External Cream Apply to affected area 2 times daily. 60 g 1    fluocinonide 0.05 % External Ointment Apply 1 Application topically 2 (two) times daily. 60 g 2    CLOTRIMAZOLE-BETAMETHASONE 1-0.05 % External Cream APPLY  CREAM TOPICALLY TWICE DAILY 15 g 0    Glucose Blood (ACCU-CHEK BEATRICE PLUS) In Vitro Strip Check blood sugar twice daily 200 strip 0    Lancets Thin Does not apply Misc Check blood sugar twice daily 200 each 2    Blood Glucose Monitoring Suppl (ACCU-CHEK BEATRICE PLUS) w/Device Does not apply Kit Check blood sugar twice daily 1 kit 0         Assessment & Plan    1. Acute bronchitis, unspecified organism (Primary)  -     Benzonatate; Take 1 capsule (200 mg total) by mouth 3 (three) times daily as needed for cough (FOR COUGH).  Dispense: 90 capsule; Refill: 0  -     Dextromethorphan-Benzocaine; Use as directed 1 lozenge in the mouth or throat every 4 to 6 hours as needed.  Dispense: 20 lozenge; Refill:  0  -     methylPREDNISolone; As directed.  Dispense: 21 each; Refill: 0  -     Fluticasone Propionate; 2 sprays by Each Nare route daily.  Dispense: 15.8 mL; Refill: 5  2. Acute non-recurrent sinusitis, unspecified location  -     Benzonatate; Take 1 capsule (200 mg total) by mouth 3 (three) times daily as needed for cough (FOR COUGH).  Dispense: 90 capsule; Refill: 0  -     Dextromethorphan-Benzocaine; Use as directed 1 lozenge in the mouth or throat every 4 to 6 hours as needed.  Dispense: 20 lozenge; Refill: 0  -     methylPREDNISolone; As directed.  Dispense: 21 each; Refill: 0  -     Fluticasone Propionate; 2 sprays by Each Nare route daily.  Dispense: 15.8 mL; Refill: 5  -     Doxycycline Hyclate; Take 1 capsule (100 mg total) by mouth 2 (two) times daily with meals for 10 days.  Dispense: 20 capsule; Refill: 0  3. Post-nasal drip  -     Fluticasone Propionate; 2 sprays by Each Nare route daily.  Dispense: 15.8 mL; Refill: 5  Pt with Acute Cough (<3 weeks) likely triggered from recent URI with burdensome symptoms  -Will start Tessalon Perles 1 p.o. 3 times daily as needed  -start doxycyline 100mg po BID for 10days for atypical coverage.   -start flonase for nasal congestion  -Given severity / given PMHx, will start medrol dose pack.  -If no improvement, follow up within the next couple to weeks.          Follow Up: As needed/if symptoms worsen or Return in about 2 weeks (around 2/26/2024), or if symptoms worsen or fail to improve..         Objective/ Results:   Physical Exam  Constitutional:       Appearance: She is well-developed.   HENT:      Nose: Congestion and rhinorrhea present.      Right Turbinates: Enlarged and swollen.      Left Turbinates: Enlarged and swollen.   Cardiovascular:      Rate and Rhythm: Normal rate and regular rhythm.      Heart sounds: Normal heart sounds.   Pulmonary:      Effort: Pulmonary effort is normal.      Breath sounds: Wheezing and rhonchi present.   Abdominal:      General:  Bowel sounds are normal.      Palpations: Abdomen is soft.   Skin:     General: Skin is warm and dry.   Neurological:      Mental Status: She is alert and oriented to person, place, and time.      Deep Tendon Reflexes: Reflexes are normal and symmetric.        Reviewed:    Patient Active Problem List    Diagnosis    Type 2 diabetes mellitus, without long-term current use of insulin (HCC)    Essential hypertension with goal blood pressure less than 130/80    Hyperlipidemia      Allergies   Allergen Reactions    Pcn [Penicillins] RASH        Social History     Socioeconomic History    Marital status:    Tobacco Use    Smoking status: Never     Passive exposure: Never    Smokeless tobacco: Never   Vaping Use    Vaping Use: Never used   Substance and Sexual Activity    Alcohol use: Yes     Comment: 1-2 glasses yearly    Drug use: No   Other Topics Concern    Caffeine Concern Yes     Comment: 2 cups tea/coffee daily    Pt has a pacemaker No    Pt has a defibrillator No    Reaction to local anesthetic No      Review of Systems   Respiratory:  Positive for cough, chest tightness and shortness of breath.      All other systems negative unless otherwise stated in ROS or HPI above.       Rinku Marie MD  Internal Medicine       Call office with any questions or seek emergency care if necessary.   Patient understands and agrees to follow directions and advice.      ----------------------------------------- PATIENT INSTRUCTIONS-----------------------------------------     There are no Patient Instructions on file for this visit.

## 2024-03-07 ENCOUNTER — OFFICE VISIT (OUTPATIENT)
Dept: AUDIOLOGY | Facility: CLINIC | Age: 69
End: 2024-03-07

## 2024-03-07 ENCOUNTER — OFFICE VISIT (OUTPATIENT)
Dept: OTOLARYNGOLOGY | Facility: CLINIC | Age: 69
End: 2024-03-07

## 2024-03-07 DIAGNOSIS — H65.03 NON-RECURRENT ACUTE SEROUS OTITIS MEDIA OF BOTH EARS: Primary | ICD-10-CM

## 2024-03-07 DIAGNOSIS — H90.6 MIXED HEARING LOSS, BILATERAL: Primary | ICD-10-CM

## 2024-03-07 PROCEDURE — 99213 OFFICE O/P EST LOW 20 MIN: CPT | Performed by: OTOLARYNGOLOGY

## 2024-03-07 PROCEDURE — 92567 TYMPANOMETRY: CPT | Performed by: AUDIOLOGIST

## 2024-03-07 PROCEDURE — 92557 COMPREHENSIVE HEARING TEST: CPT | Performed by: AUDIOLOGIST

## 2024-03-07 RX ORDER — PSEUDOEPHEDRINE HCL 120 MG/1
120 TABLET, FILM COATED, EXTENDED RELEASE ORAL EVERY 12 HOURS
Qty: 60 TABLET | Refills: 3 | Status: SHIPPED | OUTPATIENT
Start: 2024-03-07

## 2024-03-07 RX ORDER — LORATADINE 10 MG/1
10 TABLET ORAL DAILY
Qty: 30 TABLET | Refills: 3 | Status: SHIPPED | OUTPATIENT
Start: 2024-03-07

## 2024-03-07 RX ORDER — METHYLPREDNISOLONE 4 MG/1
TABLET ORAL
Qty: 21 TABLET | Refills: 0 | Status: SHIPPED | OUTPATIENT
Start: 2024-03-07

## 2024-03-07 RX ORDER — CLARITHROMYCIN 500 MG/1
500 TABLET, COATED ORAL 2 TIMES DAILY
Qty: 14 TABLET | Refills: 0 | Status: SHIPPED | OUTPATIENT
Start: 2024-03-07

## 2024-03-07 NOTE — PROGRESS NOTES
Rossy Rogers is a 68 year old female.    Chief Complaint   Patient presents with    Ear Problem     C/o bilateral pounding        HISTORY OF PRESENT ILLNESS  2015  I have seen her in the past for pharyngitis.  Recently she has been complaining about ear discomfort on the right.  Told that she had an infection.  She was started on drops many months ago with complete resolution of symptoms. At this time she senses fullness on the right and believes her ear is infected once again. No other signs, symptoms or complaints.     9/24/2020She presents with complaints of pulsations in her ear on the right for about 2 weeks.  Little bit of discomfort and pressure at times but does complain of throat discomfort as well as postnasal discharge and nasal congestion.  I have seen her in the past for similar problems and at that time she was treated with allergy medications with resolution of her symptoms.  Currently without any other signs, symptoms or complaints.  Allergies?  No formal testing or treatment in the past.     1/7/22 did well after treatment for the similar problem back in 2020 with allergy medications.  This has been going on for few weeks at this point.  She does note postnasal discharge and associated cough and throat clearing.  No other signs, symptoms or complaints.      12/19/23 Patient presents for cerumen removal. No other complaints or concerns at this time     3/7/24 she has been having pounding in her ears for a few days.  Saw Dr. Bermudez was started on fluticasone which she is using which has helped little bit ears are uncomfortable and she has some difficulty hearing.  Audiogram performed today demonstrates a mild downsloping to severe sensorineural hearing loss with a conductive component at the mid and high frequencies bilaterally.  Eardrums do demonstrate very negative middle ear pressures are slightly flattened suggestive of possible fluid or middle ear dysfunction      Social History      Socioeconomic History    Marital status:    Tobacco Use    Smoking status: Never     Passive exposure: Never    Smokeless tobacco: Never   Vaping Use    Vaping Use: Never used   Substance and Sexual Activity    Alcohol use: Yes     Comment: 1-2 glasses yearly    Drug use: No   Other Topics Concern    Caffeine Concern Yes     Comment: 2 cups tea/coffee daily    Pt has a pacemaker No    Pt has a defibrillator No    Reaction to local anesthetic No       Family History   Problem Relation Age of Onset    Uterine Cancer Mother         Cause of Death    Pulmonary Disease Father 73        COPD Cause of Death    Heart Disorder Brother 51        Congestive Heart Failure       Past Medical History:   Diagnosis Date    Diabetes (HCC)     Elevated lipids     Hyperlipidemia    High blood pressure     High cholesterol        Past Surgical History:   Procedure Laterality Date    COLONOSCOPY N/A 08/10/2021    Procedure: COLONOSCOPY;  Surgeon: Smith Winn MD;  Location: [facility] ENDOSCOPY    COLONOSCOPY & POLYPECTOMY      ELECTROCARDIOGRAM, COMPLETE  06/05/2014    Scanned to Media Tab    HYSTERECTOMY      age 41         REVIEW OF SYSTEMS    System Neg/Pos Details   Constitutional Negative Fatigue, fever and weight loss.   ENMT Negative Drooling.   Eyes Negative Blurred vision and vision changes.   Respiratory Negative Dyspnea and wheezing.   Cardio Negative Chest pain, irregular heartbeat/palpitations and syncope.   GI Negative Abdominal pain and diarrhea.   Endocrine Negative Cold intolerance and heat intolerance.   Neuro Negative Tremors.   Psych Negative Anxiety and depression.   Integumentary Negative Frequent skin infections, pigment change and rash.   Hema/Lymph Negative Easy bleeding and easy bruising.           PHYSICAL EXAM    There were no vitals taken for this visit.       Constitutional Normal Overall appearance - Normal.   Psychiatric Normal Orientation - Oriented to time, place, person & situation.  Appropriate mood and affect.   Neck Exam Normal Inspection - Normal. Palpation - Normal. Parotid gland - Normal. Thyroid gland - Normal.   Eyes Normal Conjunctiva - Right: Normal, Left: Normal. Pupil - Right: Normal, Left: Normal. Fundus - Right: Normal, Left: Normal.   Neurological Normal Memory - Normal. Cranial nerves - Cranial nerves II through XII grossly intact.   Head/Face Normal Facial features - Normal. Eyebrows - Normal. Skull - Normal.        Nasopharynx Normal External nose - Normal. Lips/teeth/gums - Normal. Tonsils - Normal. Oropharynx - Normal.   Ears Normal Inspection - Right: Normal, Left: Normal. Canal - Right: Normal, Left: Normal. TM - Right: ERYThematous eardrum with middle ear fluid left: Erythematous middle ear fluid and eardrum   Skin Normal Inspection - Normal.        Lymph Detail Normal Submental. Submandibular. Anterior cervical. Posterior cervical. Supraclavicular.        Nose/Mouth/Throat Normal External nose - Normal. Lips/teeth/gums - Normal. Tonsils - Normal. Oropharynx - Normal.   Nose/Mouth/Throat Normal Nares - Right: Normal Left: Normal. Septum -Normal  Turbinates - Right: Normal, Left: Normal.       Current Outpatient Medications:     pseudoephedrine  MG Oral Tablet 12 Hr, Take 1 tablet (120 mg total) by mouth every 12 (twelve) hours., Disp: 60 tablet, Rfl: 3    methylPREDNISolone 4 MG Oral Tablet Therapy Pack, Take by oral route., Disp: 21 tablet, Rfl: 0    loratadine 10 MG Oral Tab, Take 1 tablet (10 mg total) by mouth daily., Disp: 30 tablet, Rfl: 3    clarithromycin 500 MG Oral Tab, Take 1 tablet (500 mg total) by mouth 2 (two) times daily., Disp: 14 tablet, Rfl: 0    pioglitazone 30 MG Oral Tab, Take 1 tablet (30 mg total) by mouth daily., Disp: 90 tablet, Rfl: 0    benzonatate 200 MG Oral Cap, Take 1 capsule (200 mg total) by mouth 3 (three) times daily as needed for cough (FOR COUGH)., Disp: 90 capsule, Rfl: 0    Dextromethorphan-Benzocaine 5-7.5 MG Mouth/Throat  Lozenge, Use as directed 1 lozenge in the mouth or throat every 4 to 6 hours as needed., Disp: 20 lozenge, Rfl: 0    methylPREDNISolone (MEDROL) 4 MG Oral Tablet Therapy Pack, As directed., Disp: 21 each, Rfl: 0    fluticasone propionate 50 MCG/ACT Nasal Suspension, 2 sprays by Each Nare route daily., Disp: 15.8 mL, Rfl: 5    metFORMIN 500 MG Oral Tab, Take 2 tablets (1,000 mg total) by mouth 2 (two) times daily with meals., Disp: 360 tablet, Rfl: 0    amLODIPine 10 MG Oral Tab, Take 1 tablet (10 mg total) by mouth daily., Disp: 90 tablet, Rfl: 3    rosuvastatin 10 MG Oral Tab, Take 1 tablet (10 mg total) by mouth every evening., Disp: 90 tablet, Rfl: 3    lisinopril 5 MG Oral Tab, Take 1 tablet (5 mg total) by mouth daily., Disp: 90 tablet, Rfl: 0    ketoconazole 2 % External Cream, Apply to affected area 2 times daily., Disp: 60 g, Rfl: 1    fluocinonide 0.05 % External Ointment, Apply 1 Application topically 2 (two) times daily., Disp: 60 g, Rfl: 2    CLOTRIMAZOLE-BETAMETHASONE 1-0.05 % External Cream, APPLY  CREAM TOPICALLY TWICE DAILY, Disp: 15 g, Rfl: 0    loratadine 10 MG Oral Tab, Take 1 tablet (10 mg total) by mouth daily., Disp: 30 tablet, Rfl: 3    Glucose Blood (ACCU-CHEK BEATRICE PLUS) In Vitro Strip, Check blood sugar twice daily, Disp: 200 strip, Rfl: 0    Lancets Thin Does not apply Misc, Check blood sugar twice daily, Disp: 200 each, Rfl: 2    Blood Glucose Monitoring Suppl (ACCU-CHEK BEATRICE PLUS) w/Device Does not apply Kit, Check blood sugar twice daily, Disp: 1 kit, Rfl: 0    CALCIUM OR, Take by mouth., Disp: , Rfl:     aspirin 81 MG Oral Tab, Take 1 tablet (81 mg total) by mouth daily., Disp: , Rfl:     Multiple Vitamins-Minerals (WOMENS MULTIVITAMIN PLUS) Oral Tab, Take  by mouth., Disp: , Rfl:     nitrofurantoin monohydrate macro (MACROBID) 100 MG Oral Cap, Take 1 capsule (100 mg total) by mouth 2 (two) times daily. (Patient not taking: Reported on 2/12/2024), Disp: 20 capsule, Rfl: 0  ASSESSMENT  AND PLAN    1. Non-recurrent acute serous otitis media of both ears  Like she is developing an acute ear infection bilaterally.  Retracted eardrums with fluid.  Start clarithromycin for 7 days Medrol Dosepak Sudafed and loratadine and continue fluticasone.  Return to see me in 2 weeks if no better  - pseudoephedrine  MG Oral Tablet 12 Hr; Take 1 tablet (120 mg total) by mouth every 12 (twelve) hours.  Dispense: 60 tablet; Refill: 3        This note was prepared using Dragon Medical voice recognition dictation software. As a result errors may occur. When identified these errors have been corrected. While every attempt is made to correct errors during dictation discrepancies may still exist    Taran Nixon MD    3/7/2024    4:03 PM

## 2024-03-11 NOTE — TELEPHONE ENCOUNTER
[facility] checking status on medication refills - states patient is at pharmacy trying to get meds.   Please call - unable to get a hold of RN.  Thank you.

## 2024-03-11 NOTE — TELEPHONE ENCOUNTER
Endocrine Refill protocol for oral antihypertensive medications. Refilled per protocol.     Protocol Criteria:    -Appointment with Endocrinology completed in the last 6 months or scheduled in the next 3 months    -BMP or CMP has been completed in the last 12 months     -GFR is greater than or equal to 50    Verify the above has been completed or scheduled in the appropriate timeline. If so can send a 90 day supply with 1 refill.   Verify BMP or CMP has been completed in last year  Verify last GFR result     Last completed office visit: 11/27/23  Next scheduled Follow up: 3/18/24  Last BMP or CMP completion date: 11/14/23  GFR result: 76

## 2024-03-18 ENCOUNTER — OFFICE VISIT (OUTPATIENT)
Dept: ENDOCRINOLOGY CLINIC | Facility: CLINIC | Age: 69
End: 2024-03-18

## 2024-03-18 VITALS
HEART RATE: 99 BPM | BODY MASS INDEX: 30.44 KG/M2 | WEIGHT: 151 LBS | HEIGHT: 59 IN | SYSTOLIC BLOOD PRESSURE: 139 MMHG | DIASTOLIC BLOOD PRESSURE: 80 MMHG

## 2024-03-18 DIAGNOSIS — I10 ESSENTIAL HYPERTENSION WITH GOAL BLOOD PRESSURE LESS THAN 130/80: ICD-10-CM

## 2024-03-18 DIAGNOSIS — E11.3293 TYPE 2 DIABETES MELLITUS WITH BOTH EYES AFFECTED BY MILD NONPROLIFERATIVE RETINOPATHY WITHOUT MACULAR EDEMA, WITHOUT LONG-TERM CURRENT USE OF INSULIN (HCC): Primary | ICD-10-CM

## 2024-03-18 LAB
CARTRIDGE LOT#: ABNORMAL NUMERIC
GLUCOSE BLOOD: 203
HEMOGLOBIN A1C: 9.2 % (ref 4.3–5.6)
TEST STRIP LOT #: NORMAL NUMERIC

## 2024-03-18 PROCEDURE — 99214 OFFICE O/P EST MOD 30 MIN: CPT | Performed by: NURSE PRACTITIONER

## 2024-03-18 PROCEDURE — 82947 ASSAY GLUCOSE BLOOD QUANT: CPT | Performed by: NURSE PRACTITIONER

## 2024-03-18 PROCEDURE — 83036 HEMOGLOBIN GLYCOSYLATED A1C: CPT | Performed by: NURSE PRACTITIONER

## 2024-03-18 NOTE — PROGRESS NOTES
Name: Rossy Rogers  Date: 3/18/2024    CHIEF COMPLAINT   Chief Complaint   Patient presents with    Diabetes       HISTORY OF PRESENT ILLNESS   Rossy Rogers is a 68 year old female who presents for follow up on diabetes management.   Hgb A1C:  9.2% on 3/18/2024. This is an increase from 7.4% on 11/27/2023.   Blood glucose is: 203 in clinic today. Last ate yogurt and banana, raising, apples, pineapple and 1/2 banana with 1 small box of macaroni -- only ate 2 spoon fulls prior to office visit.     FAMILY HISTORY OF DIABETES  -all sisters- T2DM  DIABETES HISTORY  Diagnosed: around age 41  Prior HbA, C or glycohemoglobin were 9.3% on 2/25/2021; 8.9% on 12/7/2021; 8.9% 12/7/2021; 7.8% 2/28/2022; 9.7% 6/1/2022; 9.3% 7/6/2022; 8.9% 8/29/2022; 8.3% 1/30/2023; 8.4% 6/28/2023; 7.3%  8/30/2023; 7.4% 11/27/2023; 9.2% at POC today;     Patient has not had hospitalizations for blood sugar issues.   Denies any history of pancreatitis.     PREVIOUS MEDICATION FOR DM:  Trulicity- d/c'ed due to patient not wanting to use injectable medication;    -Jardiance- unable to use due to high cost   - glimepiride -d/c'ed due to hypoglycemia     CURRENT MEDICATIONS FOR DM:  -Metformin 1,000mg with breakfast             1,000mg with dinner     -Pioglitazone 30mg daily in AM - tolerating well; azael s/e   - Glimepiride 2mg daily in AM -- self re-stated around 2 weeks ago     HOME GLUCOSE READINGS:   Has not been testing her BG readings at home     HISTORY OF DIABETES COMPLICATIONS:  History of Retinopathy: yes mild- bilateral - last eye exam within the last 12 months: yes - last exam was on 2/14/2023- followed by Dr. Linette Arcos  History of Neuropathy: no  History of Nephropathy: no    ASSOCIATED COMPLICATIONS:   HTN: yes   Hyperlipidemia: yes   Coronary Artery Disease: no  Cerebrovascular Disease: no  Peripheral Vascular Disease: no      DIETARY COMPLIANCE:  poor; eating 3 meals per day + snacks  - has been participating at a senior  entertainment program that consists of food and bingo     EXERCISE:   Yes - re-started 1 week ago; stopped exercising for 1 month due to URI   - senior palmira -- 2x weekly     Polyuria, polyphagia, polydipsia: no   Paresthesias: no   Blurred vision: no   Recent steroids, illness or infections: no     REVIEW OF SYSTEMS  Constitutional: Negative for: weight change, fever, fatigue, cold/heat intolerance  Eyes: Negative for:  Visual changes, proptosis, blurring  ENT: Negative for:  dysphagia, neck swelling, dysphonia  Respiratory: Negative for: hemoptysis, shortness of breath, cough, or dyspnea.  Cardiovascular: Negative for:  chest pain, chest discomfort, palpitations  GI: Negative for:  abdominal pain, nausea, vomiting, diarrhea, heartburn, constipation  Neurology: Negative for: headache, dizziness, syncope, numbness/tingling, or weakness.   Genito-Urinary: Negative for: dysuria, frequency or hematuria   Hematology/Lymphatics: Negative for: bruising, easy bleeding, lower extremity edema  Endocrine: Negative for: polyuria, polydipsia. No thyroid disease. No osteoporosis.   Skin: Negative for: rash, blister, infection or ulcers.    MEDICATIONS:     Current Outpatient Medications:     metFORMIN 500 MG Oral Tab, TAKE 2 TABLETS BY MOUTH TWICE DAILY WITH  MEALS, Disp: 360 tablet, Rfl: 1    pseudoephedrine  MG Oral Tablet 12 Hr, Take 1 tablet (120 mg total) by mouth every 12 (twelve) hours., Disp: 60 tablet, Rfl: 3    methylPREDNISolone 4 MG Oral Tablet Therapy Pack, Take by oral route., Disp: 21 tablet, Rfl: 0    loratadine 10 MG Oral Tab, Take 1 tablet (10 mg total) by mouth daily., Disp: 30 tablet, Rfl: 3    clarithromycin 500 MG Oral Tab, Take 1 tablet (500 mg total) by mouth 2 (two) times daily., Disp: 14 tablet, Rfl: 0    pioglitazone 30 MG Oral Tab, Take 1 tablet (30 mg total) by mouth daily., Disp: 90 tablet, Rfl: 0    Dextromethorphan-Benzocaine 5-7.5 MG Mouth/Throat Lozenge, Use as directed 1 lozenge in the  mouth or throat every 4 to 6 hours as needed., Disp: 20 lozenge, Rfl: 0    methylPREDNISolone (MEDROL) 4 MG Oral Tablet Therapy Pack, As directed., Disp: 21 each, Rfl: 0    fluticasone propionate 50 MCG/ACT Nasal Suspension, 2 sprays by Each Nare route daily., Disp: 15.8 mL, Rfl: 5    nitrofurantoin monohydrate macro (MACROBID) 100 MG Oral Cap, Take 1 capsule (100 mg total) by mouth 2 (two) times daily. (Patient not taking: Reported on 2/12/2024), Disp: 20 capsule, Rfl: 0    amLODIPine 10 MG Oral Tab, Take 1 tablet (10 mg total) by mouth daily., Disp: 90 tablet, Rfl: 3    rosuvastatin 10 MG Oral Tab, Take 1 tablet (10 mg total) by mouth every evening., Disp: 90 tablet, Rfl: 3    lisinopril 5 MG Oral Tab, Take 1 tablet (5 mg total) by mouth daily., Disp: 90 tablet, Rfl: 0    ketoconazole 2 % External Cream, Apply to affected area 2 times daily., Disp: 60 g, Rfl: 1    fluocinonide 0.05 % External Ointment, Apply 1 Application topically 2 (two) times daily., Disp: 60 g, Rfl: 2    CLOTRIMAZOLE-BETAMETHASONE 1-0.05 % External Cream, APPLY  CREAM TOPICALLY TWICE DAILY, Disp: 15 g, Rfl: 0    loratadine 10 MG Oral Tab, Take 1 tablet (10 mg total) by mouth daily., Disp: 30 tablet, Rfl: 3    Glucose Blood (ACCU-CHEK BEATRICE PLUS) In Vitro Strip, Check blood sugar twice daily, Disp: 200 strip, Rfl: 0    Lancets Thin Does not apply Misc, Check blood sugar twice daily, Disp: 200 each, Rfl: 2    Blood Glucose Monitoring Suppl (ACCU-CHEK BEATRICE PLUS) w/Device Does not apply Kit, Check blood sugar twice daily, Disp: 1 kit, Rfl: 0    CALCIUM OR, Take by mouth., Disp: , Rfl:     aspirin 81 MG Oral Tab, Take 1 tablet (81 mg total) by mouth daily., Disp: , Rfl:     Multiple Vitamins-Minerals (WOMENS MULTIVITAMIN PLUS) Oral Tab, Take  by mouth., Disp: , Rfl:     ALLERGIES:   Allergies   Allergen Reactions    Pcn [Penicillins] RASH       SOCIAL HISTORY:   Social History     Socioeconomic History    Marital status:    Tobacco Use     Smoking status: Never     Passive exposure: Never    Smokeless tobacco: Never   Vaping Use    Vaping Use: Never used   Substance and Sexual Activity    Alcohol use: Yes     Comment: 1-2 glasses yearly    Drug use: No   Other Topics Concern    Caffeine Concern Yes     Comment: 2 cups tea/coffee daily    Pt has a pacemaker No    Pt has a defibrillator No    Reaction to local anesthetic No       PAST MEDICAL HISTORY:   Past Medical History:   Diagnosis Date    Diabetes (HCC)     Elevated lipids     Hyperlipidemia    High blood pressure     High cholesterol        PAST SURGICAL HISTORY:   Past Surgical History:   Procedure Laterality Date    COLONOSCOPY N/A 08/10/2021    Procedure: COLONOSCOPY;  Surgeon: Smith Winn MD;  Location: [facility] ENDOSCOPY    COLONOSCOPY & POLYPECTOMY      ELECTROCARDIOGRAM, COMPLETE  06/05/2014    Scanned to Media Tab    HYSTERECTOMY      age 41     PHYSICAL EXAM:   Vitals:    03/18/24 1129 03/18/24 1211   BP: 154/77 139/80   BP Location:  Left arm   Patient Position:  Sitting   Cuff Size:  large   Pulse: 93 99   Weight: 151 lb (68.5 kg)    Height: 4' 11\" (1.499 m)        BMI:   Body mass index is 30.5 kg/m².    General Appearance:  alert, well developed, in no acute distress  Nutritional:  no extreme weight gain or loss  Head: Atraumatic  Eyes:  normal conjunctivae, sclera., normal sclera and normal pupils  Throat/Neck: normal sound to voice. Normal hearing, normal speech  Back: no kyphosis  Respiratory:  Speaking in full sentences, non-labored. no increased work of breathing, no audible wheezing    Skin:  normal moisture and skin texture;   Hair and nails: normal scalp hair  Hematologic:  no excessive bruising  Neuro: motor grossly intact, moving all extremities without difficulty  Psychiatric:  oriented to time, self, and place  Extremities: no obvious extremity swelling, no lesions    Diabetes Foot Exam:   Bilateral barefoot skin diabetic exam is normal, visualized feet and the  appearance is normal.  Bilateral monofilament/sensation of both feet is normal.  Pulsation pedal pulse exam of both lower legs/feet is normal as well.    LABS: Pertinent labs reviewed    ASSESSMENT/PLAN:    -Reviewed with patient the pathogenesis of diabetes, clinical significance of A1c, and common complications such as: microvascular, macrovascular and diabetic ketoacidosis. Patient verbalizes understanding of the importance of glycemic control and the goals of therapy.   Per ADA 2024 guidelines, it is recommended that older adults (age 65 and above) who are healthy with few coexisting chronic illnesses, intact cognitive and functional status, should have A1C goal of <7.0 - 7.5%, fasting or preprandial glucose of  and bedtime glucose of .   1.Type 2 Diabetes Mellitus, uncontrolled  -LAB DATA  HbA,C: 9.2% today --> increased   a) Medications                    -continue with Metformin 1,000mg with breakfast           1,000mg with dinner  - continue with Pioglitazone 30mg once daily in AM   - increase glimepiride 2 (1 tab)--> 4mg (2 tabs) daily with breakfast. Discussed to take second table with lunch today. - Risks and benefits reviewed. Verbalizes understanding.      - discussed to start avoiding carbohydrates a snacks between meals. Ok to eat protein, veggies or 1-2 cups of popcorn.   -discussed to start avoiding sweets   - discussed to increase walking after each meal   - discussed to start kickboxing 1x weekly   - continue with senior palmira 2x weekly     - has not been able to use sglt-2 medications in the past due to high cost. Patient has not applied for [employer] PAP, although application has been given to her.  - has refused glp-1 medication therapy in the past due to being needlephobic.    -reviewed target goal BG readings and A1C  -reviewed when to call clinic with abnormal BG readings.     b) No nephropathy: GFR: 76 11/14/2023 and urine MA: 120.5 on 11/14/2023  c) UTD with optho -- last exam  was 2023 with Dr. Linette Richardson  D) foot exam: normal today 3/18/2024  e) start testing BG 1x daily- alternate with fasting or 2hrs after dinner - discussed to start recording BG readings in a log. Log given today.     f) Life style changes reviewed     2. Hypertension   - on Amlodipine 10mg daily  - on lisinopril 5mg daily -- has not been taking   -BP at goal today. Discussed restarting lisinopril starting today. Patient educated that his medication also provides renal protection for her.     2.hyperlipidemia   -LDL: 65 and Tri on 2023  -On rosuvastatin 10mg at bedtime - verbalizes compliance       RTC in 3 months   Patient instructed to call sooner if they develop Blood glucose readings <75 and/or if they have readings persistently >200.     The risks and benefits of my recommendations were discussed with the patient today. questions were also answered to the best of my knowledge. Patient verbalizes understanding of these issues and agrees to the plan.    3/18/2024  GISELA Francois

## 2024-03-18 NOTE — PATIENT INSTRUCTIONS
A1C: 9.2% today --> increased from 7.4% on 11/27/2023  Blood glucose: 203 in clinic today    Medications:   -continue with Metformin 1,000mg with breakfast                   1,000mg with dinner  - continue with Pioglitazone 30mg once daily in AM   - increase Glimepiride 2--> 4mg daily with breakfast       Blood pressure medications:  - continue with Amlodipine 10mg daily  - re-start Lisinopril 5mg daily     - continue to exercise 2x weekly and start kick boxing 1x weekly   - increase walking   - let's get back on track with low carb diet and avoid snack   - start testing BG readings daily and record on a log       Weight:  Wt Readings from Last 6 Encounters:   03/18/24 151 lb (68.5 kg)   02/12/24 155 lb (70.3 kg)   12/19/23 147 lb (66.7 kg)   11/27/23 155 lb (70.3 kg)   10/23/23 156 lb (70.8 kg)   09/25/23 159 lb (72.1 kg)     A1C goal:  <7.5%    Blood sugar testing:  Test your blood sugar 1 time daily   Recommended times to test: alternate with before breakfast (fasting) or 2hrs after meals     Blood sugar targets:  Before breakfast:  (preferably < 110)  2 hours after meals: <180 (preferably <150)     Call for persistent blood sugars < 75 or > 200

## 2024-03-19 ENCOUNTER — OFFICE VISIT (OUTPATIENT)
Dept: INTERNAL MEDICINE CLINIC | Facility: CLINIC | Age: 69
End: 2024-03-19

## 2024-03-19 VITALS
DIASTOLIC BLOOD PRESSURE: 65 MMHG | HEART RATE: 92 BPM | BODY MASS INDEX: 31.67 KG/M2 | HEIGHT: 58.5 IN | WEIGHT: 155 LBS | SYSTOLIC BLOOD PRESSURE: 118 MMHG

## 2024-03-19 DIAGNOSIS — I10 ESSENTIAL HYPERTENSION WITH GOAL BLOOD PRESSURE LESS THAN 130/80: ICD-10-CM

## 2024-03-19 DIAGNOSIS — R05.3 CHRONIC COUGH: Primary | ICD-10-CM

## 2024-03-19 DIAGNOSIS — E11.69 TYPE 2 DIABETES MELLITUS WITH OTHER SPECIFIED COMPLICATION, WITHOUT LONG-TERM CURRENT USE OF INSULIN (HCC): ICD-10-CM

## 2024-03-19 PROCEDURE — 99214 OFFICE O/P EST MOD 30 MIN: CPT | Performed by: INTERNAL MEDICINE

## 2024-03-19 NOTE — PROGRESS NOTES
Subjective:     Patient ID: Rossy Rogers is a 68 year old female.    HPI  Chronic cough no fever no chills    Leg edema  Weight gain    /65 (BP Location: Right arm, Patient Position: Sitting, Cuff Size: adult)   Pulse 92   Ht 4' 10.5\" (1.486 m)   Wt 155 lb (70.3 kg)   BMI 31.84 kg/m²   Wt Readings from Last 6 Encounters:   03/19/24 155 lb (70.3 kg)   03/18/24 151 lb (68.5 kg)   02/12/24 155 lb (70.3 kg)   12/19/23 147 lb (66.7 kg)   11/27/23 155 lb (70.3 kg)   10/23/23 156 lb (70.8 kg)     Body mass index is 31.84 kg/m².  HGBA1C:    Lab Results   Component Value Date    A1C 9.2 (A) 03/18/2024    A1C 7.4 (A) 11/27/2023    A1C 7.3 (A) 08/30/2023     (H) 12/07/2021       History/Other:   Review of Systems   Constitutional:  Negative for activity change, chills, fatigue and fever.   HENT:  Negative for ear discharge, nosebleeds, postnasal drip, rhinorrhea, sinus pressure and sore throat.    Eyes:  Negative for pain, discharge and redness.   Respiratory:  Positive for cough. Negative for chest tightness, shortness of breath and wheezing.    Cardiovascular:  Positive for leg swelling. Negative for chest pain and palpitations.   Gastrointestinal:  Negative for abdominal pain, blood in stool, constipation, diarrhea, nausea and vomiting.   Genitourinary:  Negative for difficulty urinating, dysuria, frequency, hematuria and urgency.   Musculoskeletal:  Negative for back pain, gait problem and joint swelling.   Skin:  Negative for rash.   Neurological:  Negative for syncope, weakness, light-headedness and headaches.   Psychiatric/Behavioral:  Negative for dysphoric mood. The patient is not nervous/anxious.      Current Outpatient Medications   Medication Sig Dispense Refill    metFORMIN 500 MG Oral Tab TAKE 2 TABLETS BY MOUTH TWICE DAILY WITH  MEALS 360 tablet 1    loratadine 10 MG Oral Tab Take 1 tablet (10 mg total) by mouth daily. 30 tablet 3    pioglitazone 30 MG Oral Tab Take 1 tablet (30 mg total) by  mouth daily. 90 tablet 0    amLODIPine 10 MG Oral Tab Take 1 tablet (10 mg total) by mouth daily. 90 tablet 3    rosuvastatin 10 MG Oral Tab Take 1 tablet (10 mg total) by mouth every evening. 90 tablet 3    lisinopril 5 MG Oral Tab Take 1 tablet (5 mg total) by mouth daily. 90 tablet 0    ketoconazole 2 % External Cream Apply to affected area 2 times daily. 60 g 1    CLOTRIMAZOLE-BETAMETHASONE 1-0.05 % External Cream APPLY  CREAM TOPICALLY TWICE DAILY 15 g 0    Glucose Blood (ACCU-CHEK BEATRICE PLUS) In Vitro Strip Check blood sugar twice daily 200 strip 0    Lancets Thin Does not apply Misc Check blood sugar twice daily 200 each 2    Blood Glucose Monitoring Suppl (ACCU-CHEK BEATRICE PLUS) w/Device Does not apply Kit Check blood sugar twice daily 1 kit 0    CALCIUM OR Take by mouth.      aspirin 81 MG Oral Tab Take 1 tablet (81 mg total) by mouth daily.      Multiple Vitamins-Minerals (WOMENS MULTIVITAMIN PLUS) Oral Tab Take  by mouth.      pseudoephedrine  MG Oral Tablet 12 Hr Take 1 tablet (120 mg total) by mouth every 12 (twelve) hours. (Patient not taking: Reported on 3/19/2024) 60 tablet 3    fluticasone propionate 50 MCG/ACT Nasal Suspension 2 sprays by Each Nare route daily. (Patient not taking: Reported on 3/19/2024) 15.8 mL 5    fluocinonide 0.05 % External Ointment Apply 1 Application topically 2 (two) times daily. (Patient not taking: Reported on 3/19/2024) 60 g 2     Allergies:  Allergies   Allergen Reactions    Pcn [Penicillins] RASH       Past Medical History:   Diagnosis Date    Diabetes (HCC)     Elevated lipids     Hyperlipidemia    High blood pressure     High cholesterol       Past Surgical History:   Procedure Laterality Date    COLONOSCOPY N/A 08/10/2021    Procedure: COLONOSCOPY;  Surgeon: Smith Winn MD;  Location: [facility] ENDOSCOPY    COLONOSCOPY & POLYPECTOMY      ELECTROCARDIOGRAM, COMPLETE  06/05/2014    Scanned to Media Tab    HYSTERECTOMY      age 41      Family History    Problem Relation Age of Onset    Uterine Cancer Mother         Cause of Death    Pulmonary Disease Father 73        COPD Cause of Death    Heart Disorder Brother 51        Congestive Heart Failure      Social History:   Social History     Socioeconomic History    Marital status:    Tobacco Use    Smoking status: Never     Passive exposure: Never    Smokeless tobacco: Never   Vaping Use    Vaping Use: Never used   Substance and Sexual Activity    Alcohol use: Yes     Comment: 1-2 glasses yearly    Drug use: No   Other Topics Concern    Caffeine Concern Yes     Comment: 2 cups tea/coffee daily    Pt has a pacemaker No    Pt has a defibrillator No    Reaction to local anesthetic No        Objective:   Physical Exam  Constitutional:       Appearance: She is well-developed. She is not ill-appearing.   HENT:      Right Ear: Ear canal normal.      Left Ear: Ear canal normal.      Mouth/Throat:      Pharynx: Oropharynx is clear.   Eyes:      Extraocular Movements: Extraocular movements intact.      Conjunctiva/sclera: Conjunctivae normal.      Pupils: Pupils are equal, round, and reactive to light.   Cardiovascular:      Rate and Rhythm: Normal rate and regular rhythm.      Heart sounds: Normal heart sounds.   Pulmonary:      Effort: Pulmonary effort is normal.      Breath sounds: Normal breath sounds.   Abdominal:      General: Bowel sounds are normal.      Palpations: Abdomen is soft.   Musculoskeletal:      Right lower leg: Edema present.      Left lower leg: Edema present.   Skin:     General: Skin is warm and dry.   Neurological:      Mental Status: She is alert.   Psychiatric:         Mood and Affect: Mood normal.         Assessment & Plan:   (R05.3) Chronic cough  (primary encounter diagnosis)  Plan: XR CHEST PA + LAT CHEST (CPT=71046)        CXR ordered  Stop lisinopril   Let me know if cough is improved within 2 wks    (E11.69) Type 2 diabetes mellitus with other specified complication, without long-term  current use of insulin (HCC)  Plan: on pioglitazoke   Pt gaining wt with leg edema  Late entry  Discussed with  endo  Stop pioglitazone  start jiardiance    (I10) Essential hypertension with goal blood pressure less than 130/80  Plan:/65 (BP Location: Right arm, Patient Position: Sitting, Cuff Size: adult)   Pulse 92   Ht 4' 10.5\" (1.486 m)   Wt 155 lb (70.3 kg)   BMI 31.84 kg/m²   Controlled monitor    Medications and most recent test results reviewed  Refill medicaitons  as needed  Potential side effect discussed  Modification of risk for CAD advised    Dietary an lifestyle change  Pt voiced understanding and agrees with plan  Pt given time to ask questions  After Visit Summary handout    Discussed  And given to patient.          Meds This Visit:  Requested Prescriptions      No prescriptions requested or ordered in this encounter       Imaging & Referrals:  None

## 2024-03-20 ENCOUNTER — TELEPHONE (OUTPATIENT)
Dept: ENDOCRINOLOGY CLINIC | Facility: CLINIC | Age: 69
End: 2024-03-20

## 2024-03-20 RX ORDER — EMPAGLIFLOZIN 25 MG/1
1 TABLET, FILM COATED ORAL DAILY
Qty: 90 TABLET | Refills: 0 | Status: SHIPPED | OUTPATIENT
Start: 2024-03-20

## 2024-03-20 NOTE — TELEPHONE ENCOUNTER
PCP has informed me that patient was noted to have edema to her legs and has been gaining weight.     Please inform patient to stop pioglitazone as this can potentially cause her symptoms. Instead, let's start Jardiance 25mg once daily.     Rx sent to pharmacy. If she is not able to , she should notify me.     Thank you!

## 2024-03-20 NOTE — TELEPHONE ENCOUNTER
3/20  Left message to call back       Called patient a 2nd time today before the end of the day. No answer. LVM for patient to call back.

## 2024-03-26 ENCOUNTER — HOSPITAL ENCOUNTER (OUTPATIENT)
Dept: MAMMOGRAPHY | Age: 69
Discharge: HOME | End: 2024-03-26
Attending: INTERNAL MEDICINE
Payer: MEDICARE

## 2024-03-26 DIAGNOSIS — Z12.31 VISIT FOR SCREENING MAMMOGRAM: ICD-10-CM

## 2024-03-26 PROCEDURE — 77063 BREAST TOMOSYNTHESIS BI: CPT | Performed by: INTERNAL MEDICINE

## 2024-03-26 PROCEDURE — 77067 SCR MAMMO BI INCL CAD: CPT | Performed by: INTERNAL MEDICINE

## 2024-04-03 NOTE — TELEPHONE ENCOUNTER
LOV 1/7/22  FU 1/6/23    Refilled per protocol. [Fatigue] : fatigue [Recent Weight Gain (___ Lbs)] : recent weight gain: [unfilled] lbs [Joint Pain] : joint pain [Negative] : Heme/Lymph

## 2024-04-23 DIAGNOSIS — J01.90 ACUTE NON-RECURRENT SINUSITIS, UNSPECIFIED LOCATION: ICD-10-CM

## 2024-04-23 DIAGNOSIS — J20.9 ACUTE BRONCHITIS, UNSPECIFIED ORGANISM: ICD-10-CM

## 2024-04-24 RX ORDER — BENZONATATE 200 MG/1
200 CAPSULE ORAL 3 TIMES DAILY PRN
Qty: 90 CAPSULE | Refills: 0 | OUTPATIENT
Start: 2024-04-24

## 2024-06-10 RX ORDER — KETOCONAZOLE 20 MG/G
CREAM TOPICAL
Qty: 60 G | Refills: 0 | OUTPATIENT
Start: 2024-06-10

## 2024-06-10 NOTE — TELEPHONE ENCOUNTER
Refill Request for medication(s):     Last Office Visit: 03/08/2023    Last Refill: 02/16/2023    Pharmacy, Dosage verified: yes    Condition Update (if applicable):     Rx pended and sent to provider for approval, please advise. Thank You!

## 2024-07-01 RX ORDER — EMPAGLIFLOZIN 25 MG/1
1 TABLET, FILM COATED ORAL DAILY
Qty: 90 TABLET | Refills: 0 | Status: SHIPPED | OUTPATIENT
Start: 2024-07-01

## 2024-07-01 NOTE — TELEPHONE ENCOUNTER
Endocrine Refill protocol for oral and injectable diabetic medications    Protocol Criteria:    -Appointment with Endocrinology completed in the last 6 months or scheduled in the next 3 months    -A1c result <8.5% in the past 6 months      Verify the above has been completed or scheduled in the appropriate timeline. If so can send a 90 day supply with 1 refill.     Last completed office visit: 3/18/2024 Lary Griffin APRN   Next scheduled Follow up:   Future Appointments   Date Time Provider Department Center   7/3/2024 11:00 AM Claudio Bermudez MD [facility]   8/9/2024  8:50 AM Talya Santiago DPM [facility]   8/21/2024  8:45 AM Lary Griffin APRN [facility]   8/30/2024  4:30 PM Marlin Simons MD [facility]      Last A1c result: Last A1c value was 9.2% done 3/18/2024.

## 2024-07-10 ENCOUNTER — LAB ENCOUNTER (OUTPATIENT)
Dept: LAB | Age: 69
End: 2024-07-10
Attending: INTERNAL MEDICINE
Payer: MEDICARE

## 2024-07-10 ENCOUNTER — OFFICE VISIT (OUTPATIENT)
Dept: INTERNAL MEDICINE CLINIC | Facility: CLINIC | Age: 69
End: 2024-07-10

## 2024-07-10 VITALS
BODY MASS INDEX: 30.24 KG/M2 | DIASTOLIC BLOOD PRESSURE: 76 MMHG | HEART RATE: 74 BPM | SYSTOLIC BLOOD PRESSURE: 138 MMHG | WEIGHT: 150 LBS | HEIGHT: 59 IN

## 2024-07-10 DIAGNOSIS — I10 ESSENTIAL HYPERTENSION: Primary | ICD-10-CM

## 2024-07-10 DIAGNOSIS — E11.69 TYPE 2 DIABETES MELLITUS WITH OTHER SPECIFIED COMPLICATION, WITHOUT LONG-TERM CURRENT USE OF INSULIN (HCC): ICD-10-CM

## 2024-07-10 DIAGNOSIS — E78.5 HYPERLIPIDEMIA, UNSPECIFIED HYPERLIPIDEMIA TYPE: ICD-10-CM

## 2024-07-10 DIAGNOSIS — E55.9 VITAMIN D DEFICIENCY: ICD-10-CM

## 2024-07-10 LAB
ALBUMIN SERPL-MCNC: 4.7 G/DL (ref 3.2–4.8)
ALBUMIN/GLOB SERPL: 1.7 {RATIO} (ref 1–2)
ALP LIVER SERPL-CCNC: 68 U/L
ALT SERPL-CCNC: 12 U/L
ANION GAP SERPL CALC-SCNC: 8 MMOL/L (ref 0–18)
AST SERPL-CCNC: 17 U/L (ref ?–34)
BASOPHILS # BLD AUTO: 0.06 X10(3) UL (ref 0–0.2)
BASOPHILS NFR BLD AUTO: 1.1 %
BILIRUB SERPL-MCNC: 0.5 MG/DL (ref 0.2–1.1)
BUN BLD-MCNC: 14 MG/DL (ref 9–23)
BUN/CREAT SERPL: 14.3 (ref 10–20)
CALCIUM BLD-MCNC: 9.6 MG/DL (ref 8.7–10.4)
CHLORIDE SERPL-SCNC: 106 MMOL/L (ref 98–112)
CHOLEST SERPL-MCNC: 161 MG/DL (ref ?–200)
CO2 SERPL-SCNC: 27 MMOL/L (ref 21–32)
CREAT BLD-MCNC: 0.98 MG/DL
CREAT UR-SCNC: 68.3 MG/DL
DEPRECATED RDW RBC AUTO: 41.9 FL (ref 35.1–46.3)
EGFRCR SERPLBLD CKD-EPI 2021: 62 ML/MIN/1.73M2 (ref 60–?)
EOSINOPHIL # BLD AUTO: 0.1 X10(3) UL (ref 0–0.7)
EOSINOPHIL NFR BLD AUTO: 1.8 %
ERYTHROCYTE [DISTWIDTH] IN BLOOD BY AUTOMATED COUNT: 12.1 % (ref 11–15)
EST. AVERAGE GLUCOSE BLD GHB EST-MCNC: 203 MG/DL (ref 68–126)
FASTING PATIENT LIPID ANSWER: NO
FASTING STATUS PATIENT QL REPORTED: NO
GLOBULIN PLAS-MCNC: 2.7 G/DL (ref 2–3.5)
GLUCOSE BLD-MCNC: 169 MG/DL (ref 70–99)
HBA1C MFR BLD: 8.7 % (ref ?–5.7)
HCT VFR BLD AUTO: 41.3 %
HDLC SERPL-MCNC: 78 MG/DL (ref 40–59)
HGB BLD-MCNC: 13.6 G/DL
IMM GRANULOCYTES # BLD AUTO: 0.01 X10(3) UL (ref 0–1)
IMM GRANULOCYTES NFR BLD: 0.2 %
LDLC SERPL CALC-MCNC: 67 MG/DL (ref ?–100)
LYMPHOCYTES # BLD AUTO: 1.41 X10(3) UL (ref 1–4)
LYMPHOCYTES NFR BLD AUTO: 25.6 %
MCH RBC QN AUTO: 30.9 PG (ref 26–34)
MCHC RBC AUTO-ENTMCNC: 32.9 G/DL (ref 31–37)
MCV RBC AUTO: 93.9 FL
MICROALBUMIN UR-MCNC: 3 MG/DL
MICROALBUMIN/CREAT 24H UR-RTO: 43.9 UG/MG (ref ?–30)
MONOCYTES # BLD AUTO: 0.37 X10(3) UL (ref 0.1–1)
MONOCYTES NFR BLD AUTO: 6.7 %
NEUTROPHILS # BLD AUTO: 3.56 X10 (3) UL (ref 1.5–7.7)
NEUTROPHILS # BLD AUTO: 3.56 X10(3) UL (ref 1.5–7.7)
NEUTROPHILS NFR BLD AUTO: 64.6 %
NONHDLC SERPL-MCNC: 83 MG/DL (ref ?–130)
OSMOLALITY SERPL CALC.SUM OF ELEC: 296 MOSM/KG (ref 275–295)
PLATELET # BLD AUTO: 235 10(3)UL (ref 150–450)
POTASSIUM SERPL-SCNC: 4.9 MMOL/L (ref 3.5–5.1)
PROT SERPL-MCNC: 7.4 G/DL (ref 5.7–8.2)
RBC # BLD AUTO: 4.4 X10(6)UL
SODIUM SERPL-SCNC: 141 MMOL/L (ref 136–145)
TRIGL SERPL-MCNC: 89 MG/DL (ref 30–149)
VIT D+METAB SERPL-MCNC: 29.5 NG/ML (ref 30–100)
VLDLC SERPL CALC-MCNC: 13 MG/DL (ref 0–30)
WBC # BLD AUTO: 5.5 X10(3) UL (ref 4–11)

## 2024-07-10 PROCEDURE — 99214 OFFICE O/P EST MOD 30 MIN: CPT | Performed by: INTERNAL MEDICINE

## 2024-07-10 PROCEDURE — 80061 LIPID PANEL: CPT

## 2024-07-10 PROCEDURE — 80053 COMPREHEN METABOLIC PANEL: CPT

## 2024-07-10 PROCEDURE — 83036 HEMOGLOBIN GLYCOSYLATED A1C: CPT

## 2024-07-10 PROCEDURE — 36415 COLL VENOUS BLD VENIPUNCTURE: CPT

## 2024-07-10 PROCEDURE — 82570 ASSAY OF URINE CREATININE: CPT

## 2024-07-10 PROCEDURE — 82306 VITAMIN D 25 HYDROXY: CPT

## 2024-07-10 PROCEDURE — 82043 UR ALBUMIN QUANTITATIVE: CPT

## 2024-07-10 PROCEDURE — 85025 COMPLETE CBC W/AUTO DIFF WBC: CPT

## 2024-07-10 RX ORDER — TRIAMCINOLONE ACETONIDE 1 MG/G
CREAM TOPICAL 2 TIMES DAILY PRN
Qty: 80 G | Refills: 0 | Status: SHIPPED | OUTPATIENT
Start: 2024-07-10

## 2024-07-10 RX ORDER — PIOGLITAZONEHYDROCHLORIDE 30 MG/1
30 TABLET ORAL DAILY
COMMUNITY
End: 2024-07-10

## 2024-07-10 RX ORDER — PIOGLITAZONEHYDROCHLORIDE 30 MG/1
30 TABLET ORAL DAILY
Qty: 90 TABLET | Refills: 1 | Status: SHIPPED | OUTPATIENT
Start: 2024-07-10

## 2024-07-11 PROBLEM — I10 ESSENTIAL HYPERTENSION WITH GOAL BLOOD PRESSURE LESS THAN 130/80: Status: RESOLVED | Noted: 2017-06-24 | Resolved: 2024-07-11
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
## 2024-08-09 ENCOUNTER — OFFICE VISIT (OUTPATIENT)
Dept: PODIATRY CLINIC | Facility: CLINIC | Age: 69
End: 2024-08-09

## 2024-08-09 DIAGNOSIS — Q82.8 POROKERATOSIS: ICD-10-CM

## 2024-08-09 DIAGNOSIS — M79.671 PAIN IN BOTH FEET: ICD-10-CM

## 2024-08-09 DIAGNOSIS — M79.672 PAIN IN BOTH FEET: ICD-10-CM

## 2024-08-09 DIAGNOSIS — E11.9 TYPE 2 DIABETES MELLITUS WITHOUT COMPLICATION, WITHOUT LONG-TERM CURRENT USE OF INSULIN (HCC): Primary | ICD-10-CM

## 2024-08-09 PROCEDURE — 99213 OFFICE O/P EST LOW 20 MIN: CPT | Performed by: STUDENT IN AN ORGANIZED HEALTH CARE EDUCATION/TRAINING PROGRAM

## 2024-08-09 NOTE — PROGRESS NOTES
[facility] Podiatry  Progress Note      Rossy Rogers is a 68 year old female.   Chief Complaint   Patient presents with    Foot Pain     Bilateral 5th toes corn f/u- rates pain 10/10 on and off-  FBS this am= 130             HPI:     Patient is a 68-year-old diabetic female presents to clinic for bilateral calluses on the plantar aspect of her fifth metatarsal.  Admits to pain with pressure.    Allergies: Pcn [penicillins]    Current Outpatient Medications   Medication Sig Dispense Refill    pioglitazone 30 MG Oral Tab Take 1 tablet (30 mg total) by mouth daily. 90 tablet 1    triamcinolone 0.1 % External Cream Apply topically 2 (two) times daily as needed. 80 g 0    JARDIANCE 25 MG Oral Tab Take 1 tablet by mouth once daily 90 tablet 0    metFORMIN 500 MG Oral Tab TAKE 2 TABLETS BY MOUTH TWICE DAILY WITH  MEALS 360 tablet 1    loratadine 10 MG Oral Tab Take 1 tablet (10 mg total) by mouth daily. 30 tablet 3    amLODIPine 10 MG Oral Tab Take 1 tablet (10 mg total) by mouth daily. 90 tablet 3    rosuvastatin 10 MG Oral Tab Take 1 tablet (10 mg total) by mouth every evening. 90 tablet 3    CLOTRIMAZOLE-BETAMETHASONE 1-0.05 % External Cream APPLY  CREAM TOPICALLY TWICE DAILY 15 g 0    Glucose Blood (ACCU-CHEK BEATRICE PLUS) In Vitro Strip Check blood sugar twice daily 200 strip 0    Lancets Thin Does not apply Misc Check blood sugar twice daily 200 each 2    Blood Glucose Monitoring Suppl (ACCU-CHEK BEATRICE PLUS) w/Device Does not apply Kit Check blood sugar twice daily 1 kit 0    CALCIUM OR Take by mouth.      aspirin 81 MG Oral Tab Take 1 tablet (81 mg total) by mouth daily.      Multiple Vitamins-Minerals (WOMENS MULTIVITAMIN PLUS) Oral Tab Take  by mouth.        Past Medical History:    Diabetes (HCC)    Elevated lipids    Hyperlipidemia    High blood pressure    High cholesterol      Past Surgical History:   Procedure Laterality Date    Colonoscopy N/A 08/10/2021    Procedure: COLONOSCOPY;  Surgeon: Tatum  Smith St MD;  Location: [facility] ENDOSCOPY    Colonoscopy & polypectomy      Electrocardiogram, complete  06/05/2014    Scanned to Media Tab    Hysterectomy      age 41      Family History   Problem Relation Age of Onset    Uterine Cancer Mother         Cause of Death    Pulmonary Disease Father 73        COPD Cause of Death    Heart Disorder Brother 51        Congestive Heart Failure      Social History     Socioeconomic History    Marital status:    Tobacco Use    Smoking status: Never     Passive exposure: Never    Smokeless tobacco: Never   Vaping Use    Vaping status: Never Used   Substance and Sexual Activity    Alcohol use: Yes     Comment: 1-2 glasses yearly    Drug use: No   Other Topics Concern    Caffeine Concern Yes     Comment: 2 cups tea/coffee daily    Pt has a pacemaker No    Pt has a defibrillator No    Reaction to local anesthetic No           REVIEW OF SYSTEMS:     Denies nause, fever, chills  No calf pain  Denies chest pain or SOB      EXAM:   There were no vitals taken for this visit.  GENERAL: well developed, well nourished, in no apparent distress  EXTREMITIES:   1. Integument: Normal skin temperature and turgor.  Hyperkeratotic tissue on the plantar aspect of bilateral fifth metatarsals with deep nucleated core  2. Vascular: Dorsalis pedis two out of four bilateral and posterior tibial pulses two out of   four bilateral, capillary refill normal.   3. Musculoskeletal: All muscle groups are graded 5 out of 5 in the foot and ankle.  Pain with palpation to hyperkeratotic lesions of both feet   4. Neurological: Normal sharp dull sensation; reflexes normal.    Bilateral barefoot skin diabetic exam is normal, visualized feet and the appearance is normal.  Bilateral monofilament/sensation of both feet is normal.  Pulsation pedal pulse exam of both lower legs/feet is normal as well.             ASSESSMENT AND PLAN:   Diagnoses and all orders for this visit:    Type 2 diabetes mellitus  without complication, without long-term current use of insulin (HCC)    Porokeratosis    Pain in both feet        Plan:       -Patient examined, chart history reviewed.  -Discussed importance of proper pedal hygiene, regular foot checks, and tight glucose control.  -pared flaco 5th metatarsal with sterile blade to healthy skin with no incident.   -Ambulate with supportive shoes and inserts and avoid walking barefoot.  -Educated patient on acute signs of infection advised patient to seek immediate medical attention if symptoms arise.      The patient indicates understanding of these issues and agrees to the plan.        Talya Santiago DPM

## 2024-08-21 ENCOUNTER — OFFICE VISIT (OUTPATIENT)
Dept: ENDOCRINOLOGY CLINIC | Facility: CLINIC | Age: 69
End: 2024-08-21

## 2024-08-21 VITALS
HEIGHT: 58.5 IN | HEART RATE: 78 BPM | WEIGHT: 150 LBS | BODY MASS INDEX: 30.65 KG/M2 | SYSTOLIC BLOOD PRESSURE: 138 MMHG | DIASTOLIC BLOOD PRESSURE: 75 MMHG

## 2024-08-21 DIAGNOSIS — E11.3293 TYPE 2 DIABETES MELLITUS WITH BOTH EYES AFFECTED BY MILD NONPROLIFERATIVE RETINOPATHY WITHOUT MACULAR EDEMA, WITHOUT LONG-TERM CURRENT USE OF INSULIN (HCC): Primary | ICD-10-CM

## 2024-08-21 LAB
GLUCOSE BLOOD: 219
HEMOGLOBIN A1C: 9 % (ref 4.3–5.6)
TEST STRIP LOT #: NORMAL NUMERIC

## 2024-08-21 PROCEDURE — 99214 OFFICE O/P EST MOD 30 MIN: CPT | Performed by: NURSE PRACTITIONER

## 2024-08-21 PROCEDURE — 83036 HEMOGLOBIN GLYCOSYLATED A1C: CPT | Performed by: NURSE PRACTITIONER

## 2024-08-21 PROCEDURE — 82947 ASSAY GLUCOSE BLOOD QUANT: CPT | Performed by: NURSE PRACTITIONER

## 2024-08-21 RX ORDER — TIRZEPATIDE 5 MG/.5ML
5 INJECTION, SOLUTION SUBCUTANEOUS WEEKLY
Qty: 2 ML | Refills: 0 | Status: SHIPPED | OUTPATIENT
Start: 2024-09-11

## 2024-08-21 RX ORDER — TIRZEPATIDE 2.5 MG/.5ML
2.5 INJECTION, SOLUTION SUBCUTANEOUS WEEKLY
Qty: 2 ML | Refills: 0 | Status: SHIPPED | OUTPATIENT
Start: 2024-08-21

## 2024-08-21 NOTE — PATIENT INSTRUCTIONS
A1C: 9.0% today --> previously was 8.7% on 7/10/2024.   Blood glucose: 219 in clinic today    Medications:   - continue with Metformin 1,000mg with breakfast                1,000mg with dinner  - increase Glimepiride to 4mg daily (2 tablets) with breakfast   --> decrease dose after starting Mounjaro   - continue with Jardiance 25mg once daily in AM   - start Mounjaro 2.5mg once weekly for 4 weeks      - schedule apt with eye doctor - Dr. Linette Richardson   - increase exercise   - avoid soda (diet or regular)   - reduce rice portion to only 1/2 cup    Weight:  Wt Readings from Last 6 Encounters:   08/21/24 150 lb (68 kg)   07/10/24 150 lb (68 kg)   03/19/24 155 lb (70.3 kg)   03/18/24 151 lb (68.5 kg)   02/12/24 155 lb (70.3 kg)   12/19/23 147 lb (66.7 kg)     A1C goal:  <7.5%    Blood sugar testing:  Test your blood sugar 1 time daily   Recommended times to test: alternate with before breakfast (fasting) or 2hrs after meals     Blood sugar targets:  Before breakfast:  (preferably < 110)  2 hours after meals: <180 (preferably <150)     Call for persistent blood sugars < 75 or > 200
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
## 2024-10-15 RX ORDER — GLIMEPIRIDE 2 MG/1
TABLET ORAL
Qty: 270 TABLET | Refills: 0 | Status: SHIPPED | OUTPATIENT
Start: 2024-10-15

## 2024-10-15 NOTE — TELEPHONE ENCOUNTER
Endocrine Refill protocol for oral and injectable diabetic medications    Protocol Criteria:  FAILED  Reason: Elevated A1C    If all below requirements are met, send a 90-day supply with 1 refill per provider protocol.    Verify appointment with Endocrinology completed in the last 6 months or scheduled in the next 3 months.  Verify A1C has been completed within the last 6 months and is below 8.5%     Last completed office visit: 8/21/2024 Lary Griffin APRN   Next scheduled Follow up:   Future Appointments   Date Time Provider Department Center   10/23/2024  9:30 AM Lary Griffin APRN [facility]      Last A1c result: Last A1c value was 9% done 8/21/2024.

## 2024-10-23 ENCOUNTER — OFFICE VISIT (OUTPATIENT)
Dept: ENDOCRINOLOGY CLINIC | Facility: CLINIC | Age: 69
End: 2024-10-23

## 2024-10-23 VITALS
WEIGHT: 156.63 LBS | SYSTOLIC BLOOD PRESSURE: 150 MMHG | DIASTOLIC BLOOD PRESSURE: 81 MMHG | BODY MASS INDEX: 36.25 KG/M2 | HEIGHT: 55 IN | HEART RATE: 86 BPM

## 2024-10-23 DIAGNOSIS — E11.3293 TYPE 2 DIABETES MELLITUS WITH BOTH EYES AFFECTED BY MILD NONPROLIFERATIVE RETINOPATHY WITHOUT MACULAR EDEMA, WITHOUT LONG-TERM CURRENT USE OF INSULIN (HCC): Primary | ICD-10-CM

## 2024-10-23 PROBLEM — E66.01 SEVERE OBESITY (BMI 35.0-39.9) WITH COMORBIDITY (HCC): Chronic | Status: ACTIVE | Noted: 2024-10-23

## 2024-10-23 LAB
GLUCOSE BLOOD: 161
HEMOGLOBIN A1C: 6.9 % (ref 4.3–5.6)
TEST STRIP LOT #: NORMAL NUMERIC

## 2024-10-23 PROCEDURE — 82947 ASSAY GLUCOSE BLOOD QUANT: CPT | Performed by: NURSE PRACTITIONER

## 2024-10-23 PROCEDURE — 83036 HEMOGLOBIN GLYCOSYLATED A1C: CPT | Performed by: NURSE PRACTITIONER

## 2024-10-23 PROCEDURE — 99214 OFFICE O/P EST MOD 30 MIN: CPT | Performed by: NURSE PRACTITIONER

## 2024-10-23 RX ORDER — TIRZEPATIDE 7.5 MG/.5ML
7.5 INJECTION, SOLUTION SUBCUTANEOUS WEEKLY
Qty: 6 ML | Refills: 0 | Status: SHIPPED | OUTPATIENT
Start: 2024-10-23

## 2024-10-23 NOTE — PATIENT INSTRUCTIONS
A1C: 6.9% today --> decreased from 9.0% on 8/21/2024  Blood glucose: 161 in clinic today  Endocrinology fax# [phone]    Medications:   -continue with Metformin 1,000mg with lunch                1,000mg with dinner  - decrease Glimepiride to 2mg (1 tablet) once daily with lunch  - continue with Jardiance 25mg once daily in the morning       - continue with low carb diet   - continue with current exercise frequency        Weight:  Wt Readings from Last 6 Encounters:   10/23/24 156 lb 9.6 oz (71 kg)   08/21/24 150 lb (68 kg)   07/10/24 150 lb (68 kg)   03/19/24 155 lb (70.3 kg)   03/18/24 151 lb (68.5 kg)   02/12/24 155 lb (70.3 kg)     A1C goal:  <7.5%    Blood sugar testing:  Test your blood sugar 1 time daily   Recommended times to test: Before breakfast (fasting) or 2hrs after meals     Blood sugar targets:  Before breakfast:   (preferably < 110)  Before meals: <150    Call for persistent blood sugars < 75 or > 200

## 2024-10-23 NOTE — PROGRESS NOTES
Name: Rossy Rogers  Date: 10/23/2024    CHIEF COMPLAINT   Chief Complaint   Patient presents with    Diabetes     Pt is here for follow up for diabetes and A1c check        HISTORY OF PRESENT ILLNESS   Rossy Rogers is a 69 year old female who presents for follow up on diabetes management.   Hgb A1C: 6.9% at POC today. This is decreased from 9.0% on 2024.   Blood glucose is: 161 in clinic today.   Patient notes that she has been feeling well and denies any health changes or taking new medications since last office visit. She continues to follow a low carb diet and staying active per usual. She has been compliant with taking all diabetes medications.     FAMILY HISTORY OF DIABETES  -all sisters- T2DM  DIABETES HISTORY  Diagnosed: around age 41  Prior HbA, C or glycohemoglobin were 9.3% on 2021; 8.9% on 2021; 8.9% 2021; 7.8% 2022; 9.7% 2022; 9.3% 2022; 8.9% 2022; 8.3% 2023; 8.4% 2023; 7.3%  2023; 7.4% 2023; 9.2% 3/2024; 8.7% 7/10/2024; 9.0% 2024; 6.9% at POC today;     Patient has not had hospitalizations for blood sugar issues.   Denies any history of pancreatitis.     PREVIOUS MEDICATION FOR DM:  Trulicity- d/c'ed due to patient not wanting to use injectable medication;    -Jardiance- unable to use due to high cost   - glimepiride -d/c'ed due to hypoglycemia   -Pioglitazone- d/c'ed due to leg edema     CURRENT MEDICATIONS FOR DM:  -Metformin 1,000mg with breakfast --has been taking with lunch            1,000mg with dinner     -Jardiance 25mg daily in AM -- has been taking in evening   - Glimepiride 4mg daily with lunch   - Mounjaro-- has not taken due to high cost     HOME GLUCOSE READINGS:   Testinx daily   Fastins- 100s  Hypoglycemia: no -- however she has had 1x weekly episodes of increased hunger and diaphoresis symptoms    HISTORY OF DIABETES COMPLICATIONS:  History of Retinopathy: yes mild- bilateral - last eye exam within the last  12 months: yes - last exam was on 2/14/2023- followed by Dr. Linette Arcos  History of Neuropathy: no  History of Nephropathy: no    ASSOCIATED COMPLICATIONS:   HTN: yes   Hyperlipidemia: yes   Coronary Artery Disease: no  Cerebrovascular Disease: no  Peripheral Vascular Disease: no      DIETARY COMPLIANCE:  Good; has been working on reducing rice and following a low carb diet more consistently     EXERCISE:   Yes - 3x weekly - kick boxing, palmira and silver sneakers     Polyuria, polyphagia, polydipsia: no   Paresthesias: no   Blurred vision: no   Recent steroids, illness or infections: no     REVIEW OF SYSTEMS  Constitutional: Negative for: weight change, fever, fatigue, cold/heat intolerance  Eyes: Negative for:  Visual changes, proptosis, blurring  ENT: Negative for:  dysphagia, neck swelling, dysphonia  Respiratory: Negative for: hemoptysis, shortness of breath, cough, or dyspnea.  Cardiovascular: Negative for:  chest pain, chest discomfort, palpitations  GI: Negative for:  abdominal pain, nausea, vomiting, diarrhea, heartburn, constipation  Neurology: Negative for: headache, dizziness, syncope, numbness/tingling, or weakness.   Genito-Urinary: Negative for: dysuria, frequency or hematuria   Hematology/Lymphatics: Negative for: bruising, easy bleeding, lower extremity edema  Endocrine: Negative for: polyuria, polydipsia. No thyroid disease. No osteoporosis.   Skin: Negative for: rash, blister, infection or ulcers.    MEDICATIONS:     Current Outpatient Medications:     GLIMEPIRIDE 2 MG Oral Tab, TAKE 2 TABLETS BY MOUTH WITH BREAKFAST AND 1 TABLET WITH SUPPER, Disp: 270 tablet, Rfl: 0    METFORMIN 500 MG Oral Tab, TAKE 2 TABLETS BY MOUTH TWICE DAILY WITH MEALS, Disp: 360 tablet, Rfl: 0    Tirzepatide (MOUNJARO) 2.5 MG/0.5ML Subcutaneous Solution Pen-injector, Inject 2.5 mg into the skin once a week., Disp: 2 mL, Rfl: 0    Tirzepatide (MOUNJARO) 5 MG/0.5ML Subcutaneous Solution Pen-injector, Inject 5 mg into  the skin once a week., Disp: 2 mL, Rfl: 0    pioglitazone 30 MG Oral Tab, Take 1 tablet (30 mg total) by mouth daily., Disp: 90 tablet, Rfl: 1    triamcinolone 0.1 % External Cream, Apply topically 2 (two) times daily as needed., Disp: 80 g, Rfl: 0    JARDIANCE 25 MG Oral Tab, Take 1 tablet by mouth once daily, Disp: 90 tablet, Rfl: 0    loratadine 10 MG Oral Tab, Take 1 tablet (10 mg total) by mouth daily., Disp: 30 tablet, Rfl: 3    amLODIPine 10 MG Oral Tab, Take 1 tablet (10 mg total) by mouth daily., Disp: 90 tablet, Rfl: 3    rosuvastatin 10 MG Oral Tab, Take 1 tablet (10 mg total) by mouth every evening., Disp: 90 tablet, Rfl: 3    CLOTRIMAZOLE-BETAMETHASONE 1-0.05 % External Cream, APPLY  CREAM TOPICALLY TWICE DAILY, Disp: 15 g, Rfl: 0    Glucose Blood (ACCU-CHEK BEATRICE PLUS) In Vitro Strip, Check blood sugar twice daily, Disp: 200 strip, Rfl: 0    Lancets Thin Does not apply Misc, Check blood sugar twice daily, Disp: 200 each, Rfl: 2    Blood Glucose Monitoring Suppl (ACCU-CHEK BEATRICE PLUS) w/Device Does not apply Kit, Check blood sugar twice daily, Disp: 1 kit, Rfl: 0    CALCIUM OR, Take by mouth., Disp: , Rfl:     aspirin 81 MG Oral Tab, Take 1 tablet (81 mg total) by mouth daily., Disp: , Rfl:     Multiple Vitamins-Minerals (WOMENS MULTIVITAMIN PLUS) Oral Tab, Take  by mouth., Disp: , Rfl:     ALLERGIES:   Allergies   Allergen Reactions    Pcn [Penicillins] RASH       SOCIAL HISTORY:   Social History     Socioeconomic History    Marital status:    Tobacco Use    Smoking status: Never     Passive exposure: Never    Smokeless tobacco: Never   Vaping Use    Vaping status: Never Used   Substance and Sexual Activity    Alcohol use: Yes     Comment: 1-2 glasses yearly    Drug use: No   Other Topics Concern    Caffeine Concern Yes     Comment: 2 cups tea/coffee daily    Pt has a pacemaker No    Pt has a defibrillator No    Reaction to local anesthetic No       PAST MEDICAL HISTORY:   Past Medical History:     Diabetes (HCC)    Elevated lipids    Hyperlipidemia    High blood pressure    High cholesterol       PAST SURGICAL HISTORY:   Past Surgical History:   Procedure Laterality Date    Colonoscopy N/A 08/10/2021    Procedure: COLONOSCOPY;  Surgeon: Smith Winn MD;  Location: [facility] ENDOSCOPY    Colonoscopy & polypectomy      Electrocardiogram, complete  06/05/2014    Scanned to Media Tab    Hysterectomy      age 41     PHYSICAL EXAM:   Vitals:    10/23/24 0939   BP: 141/85   Pulse: 79   Weight: 156 lb 9.6 oz (71 kg)   Height: 4' 6.5\" (1.384 m)       BMI:   Body mass index is 37.07 kg/m².    General Appearance:  alert, well developed, in no acute distress  Nutritional:  no extreme weight gain or loss  Head: Atraumatic  Eyes:  normal conjunctivae, sclera., normal sclera and normal pupils  Throat/Neck: normal sound to voice. Normal hearing, normal speech  Back: no kyphosis  Respiratory:  Speaking in full sentences, non-labored. no increased work of breathing, no audible wheezing    Skin:  normal moisture and skin texture;   Hair and nails: normal scalp hair  Hematologic:  no excessive bruising  Neuro: motor grossly intact, moving all extremities without difficulty  Psychiatric:  oriented to time, self, and place  Extremities: no obvious extremity swelling, no lesions    Diabetes Foot Exam:  Bilateral barefoot skin diabetic exam is normal, visualized feet and the appearance is normal.  Bilateral monofilament/sensation of both feet is normal.  Pulsation pedal pulse exam of both lower legs/feet is normal as well.    LABS: Pertinent labs reviewed    ASSESSMENT/PLAN:    -Reviewed with patient the pathogenesis of diabetes, clinical significance of A1c, and common complications such as: microvascular, macrovascular and diabetic ketoacidosis. Patient verbalizes understanding of the importance of glycemic control and the goals of therapy.   Per ADA 2024 guidelines, it is recommended that older adults (age 65 and above)  who are healthy with few coexisting chronic illnesses, intact cognitive and functional status, should have A1C goal of <7.0 - 7.5%, fasting or preprandial glucose of  and bedtime glucose of .   1.Type 2 Diabetes Mellitus, uncontrolled with hypoglycemia   -LAB DATA  HbA,C: 6.9% today  a) Medications                    -continue with Metformin 1,000mg with lunch          1,000mg with dinner  - decrease Glimepiride to 2mg daily (1 tablet) with breakfast  - continue with Jardiance 25mg once daily -- discussed to start taking in the morning       - discussed to cont. with following a low carb diet   - discussed to cont. With current exercise frequency   -reviewed target goal BG readings and A1C  -reviewed when to call clinic with abnormal BG readings.     b) No nephropathy: GFR: 62 on 7/10/2024 and urine MA: 43.9 on 7/10/2024  c) UTD with optho -- last exam was 2023 with Dr. Linette Richardson --> encouraged to schedule apt with optho asap  D) foot exam: normal today 10/23/2024  e) start testing BG 1x daily- alternate with fasting or 2hrs after dinner   f) Life style changes reviewed     2. Hypertension   - on Amlodipine 10mg daily and lisinopril 5mg daily   - above goal today; patient had not taken her BP medications today    2.hyperlipidemia   -LDL: 67 and Tri on 7/10/2024  -On rosuvastatin 10mg at bedtime       RTC in 3 months   Patient instructed to call sooner if they develop Blood glucose readings <75 and/or if they have readings persistently >200.     The risks and benefits of my recommendations were discussed with the patient today. questions were also answered to the best of my knowledge. Patient verbalizes understanding of these issues and agrees to the plan.    10/23/2024  GISELA Francois

## 2024-11-02 ENCOUNTER — OFFICE VISIT (OUTPATIENT)
Dept: DERMATOLOGY CLINIC | Facility: CLINIC | Age: 69
End: 2024-11-02

## 2024-11-02 DIAGNOSIS — L81.4 LENTIGO: ICD-10-CM

## 2024-11-02 DIAGNOSIS — L82.1 SK (SEBORRHEIC KERATOSIS): ICD-10-CM

## 2024-11-02 DIAGNOSIS — Q80.0 ICHTHYOSIS VULGARIS: ICD-10-CM

## 2024-11-02 DIAGNOSIS — L30.4 INTERTRIGO: ICD-10-CM

## 2024-11-02 DIAGNOSIS — L30.9 DERMATITIS: Primary | ICD-10-CM

## 2024-11-02 DIAGNOSIS — D23.9 BENIGN NEOPLASM OF SKIN, UNSPECIFIED LOCATION: ICD-10-CM

## 2024-11-02 DIAGNOSIS — D22.9 MULTIPLE NEVI: ICD-10-CM

## 2024-11-02 PROCEDURE — 99213 OFFICE O/P EST LOW 20 MIN: CPT | Performed by: DERMATOLOGY

## 2024-11-02 PROCEDURE — 1159F MED LIST DOCD IN RCRD: CPT | Performed by: DERMATOLOGY

## 2024-11-02 PROCEDURE — 1160F RVW MEDS BY RX/DR IN RCRD: CPT | Performed by: DERMATOLOGY

## 2024-11-02 RX ORDER — CLOTRIMAZOLE AND BETAMETHASONE DIPROPIONATE 10; .64 MG/G; MG/G
CREAM TOPICAL
Qty: 45 G | Refills: 2 | Status: SHIPPED | OUTPATIENT
Start: 2024-11-02

## 2024-11-02 NOTE — PROGRESS NOTES
Rossy Rogers is a 69 year old female.  HPI:     CC:    Chief Complaint   Patient presents with    Dermatitis     LOV 03/08/23- Pt presents for dermatitis f/u to the abdominal folds. Pt was using CLOTRIMAZOLE-BETAMETHASONE 1-0.05 % External Cream as needed with improvement but ran out and started flaring. Has been using Triamcinolone cream x 2 months (prescribed by Dr Bermudez). Dermatitis bumps are itchy sometimes with dryness and hyperpigmentation but not tender.            HISTORY:    Past Medical History:    Diabetes (HCC)    Elevated lipids    Hyperlipidemia    High blood pressure    High cholesterol      Past Surgical History:   Procedure Laterality Date    Colonoscopy N/A 08/10/2021    Procedure: COLONOSCOPY;  Surgeon: Smith Winn MD;  Location: [facility] ENDOSCOPY    Colonoscopy & polypectomy      Electrocardiogram, complete  06/05/2014    Scanned to Media Tab    Hysterectomy      age 41      Family History   Problem Relation Age of Onset    Uterine Cancer Mother         Cause of Death    Pulmonary Disease Father 73        COPD Cause of Death    Heart Disorder Brother 51        Congestive Heart Failure      Social History     Socioeconomic History    Marital status:    Tobacco Use    Smoking status: Never     Passive exposure: Never    Smokeless tobacco: Never   Vaping Use    Vaping status: Never Used   Substance and Sexual Activity    Alcohol use: Yes     Comment: 1-2 glasses yearly    Drug use: No   Other Topics Concern    Caffeine Concern Yes     Comment: 2 cups tea/coffee daily    Pt has a pacemaker No    Pt has a defibrillator No    Reaction to local anesthetic No        Current Outpatient Medications   Medication Sig Dispense Refill    clotrimazole-betamethasone 1-0.05 % External Cream Use bid to affected areas of skin 45 g 2    Tirzepatide (MOUNJARO) 7.5 MG/0.5ML Subcutaneous Solution Auto-injector Inject 7.5 mg into the skin once a week. 6 mL 0    GLIMEPIRIDE 2 MG Oral Tab TAKE 2  TABLETS BY MOUTH WITH BREAKFAST AND 1 TABLET WITH SUPPER 270 tablet 0    METFORMIN 500 MG Oral Tab TAKE 2 TABLETS BY MOUTH TWICE DAILY WITH MEALS 360 tablet 0    Tirzepatide (MOUNJARO) 2.5 MG/0.5ML Subcutaneous Solution Pen-injector Inject 2.5 mg into the skin once a week. 2 mL 0    Tirzepatide (MOUNJARO) 5 MG/0.5ML Subcutaneous Solution Pen-injector Inject 5 mg into the skin once a week. 2 mL 0    pioglitazone 30 MG Oral Tab Take 1 tablet (30 mg total) by mouth daily. 90 tablet 1    triamcinolone 0.1 % External Cream Apply topically 2 (two) times daily as needed. 80 g 0    loratadine 10 MG Oral Tab Take 1 tablet (10 mg total) by mouth daily. 30 tablet 3    amLODIPine 10 MG Oral Tab Take 1 tablet (10 mg total) by mouth daily. 90 tablet 3    rosuvastatin 10 MG Oral Tab Take 1 tablet (10 mg total) by mouth every evening. 90 tablet 3    Glucose Blood (ACCU-CHEK BEATRICE PLUS) In Vitro Strip Check blood sugar twice daily 200 strip 0    Lancets Thin Does not apply Misc Check blood sugar twice daily 200 each 2    Blood Glucose Monitoring Suppl (ACCU-CHEK BEATRICE PLUS) w/Device Does not apply Kit Check blood sugar twice daily 1 kit 0    CALCIUM OR Take by mouth.      aspirin 81 MG Oral Tab Take 1 tablet (81 mg total) by mouth daily.      Multiple Vitamins-Minerals (WOMENS MULTIVITAMIN PLUS) Oral Tab Take  by mouth.      JARDIANCE 25 MG Oral Tab Take 1 tablet by mouth once daily 90 tablet 0    CLOTRIMAZOLE-BETAMETHASONE 1-0.05 % External Cream APPLY  CREAM TOPICALLY TWICE DAILY (Patient not taking: Reported on 11/2/2024) 15 g 0     Allergies:   Allergies[1]    Past Medical History:    Diabetes (HCC)    Elevated lipids    Hyperlipidemia    High blood pressure    High cholesterol     Past Surgical History:   Procedure Laterality Date    Colonoscopy N/A 08/10/2021    Procedure: COLONOSCOPY;  Surgeon: Smith Winn MD;  [facility] ENDOSCOPY    Colonoscopy & polypectomy      Electrocardiogram, complete  06/05/2014     Scanned to Media Tab    Hysterectomy      age 41     Social History     Socioeconomic History    Marital status:      Spouse name: Not on file    Number of children: Not on file    Years of education: Not on file    Highest education level: Not on file   Occupational History    Not on file   Tobacco Use    Smoking status: Never     Passive exposure: Never    Smokeless tobacco: Never   Vaping Use    Vaping status: Never Used   Substance and Sexual Activity    Alcohol use: Yes     Comment: 1-2 glasses yearly    Drug use: No    Sexual activity: Not on file   Other Topics Concern     Service Not Asked    Blood Transfusions Not Asked    Caffeine Concern Yes     Comment: 2 cups tea/coffee daily    Occupational Exposure Not Asked    Hobby Hazards Not Asked    Sleep Concern Not Asked    Stress Concern Not Asked    Weight Concern Not Asked    Special Diet Not Asked    Back Care Not Asked    Exercise Not Asked    Bike Helmet Not Asked    Seat Belt Not Asked    Self-Exams Not Asked    Grew up on a farm Not Asked    History of tanning Not Asked    Outdoor occupation Not Asked    Pt has a pacemaker No    Pt has a defibrillator No    Breast feeding Not Asked    Reaction to local anesthetic No   Social History Narrative    Not on file     Social Drivers of Health     Financial Resource Strain: Not on file   Food Insecurity: Not on file   Transportation Needs: Not on file   Physical Activity: Not on file   Stress: Not on file   Social Connections: Not on file   Housing Stability: Not on file     Family History   Problem Relation Age of Onset    Uterine Cancer Mother         Cause of Death    Pulmonary Disease Father 73        COPD Cause of Death    Heart Disorder Brother 51        Congestive Heart Failure       There were no vitals filed for this visit.    HPI:  Chief Complaint   Patient presents with    Dermatitis     LOV 03/08/23- Pt presents for dermatitis f/u to the abdominal folds. Pt was using  CLOTRIMAZOLE-BETAMETHASONE 1-0.05 % External Cream as needed with improvement but ran out and started flaring. Has been using Triamcinolone cream x 2 months (prescribed by Dr Bermudez). Dermatitis bumps are itchy sometimes with dryness and hyperpigmentation but not tender.        Follow-up dermatitis, intertrigo  No personal  or family history of skin cancer    Patient presents with concerns above.    Patient has been in their usual state of health.     Past notes/ records and appropriate/relevant lab results including pathology and past body maps reviewed. Including outside notes/ PCP notes as appropriate. Updated and new information noted in current visit.     ROS:  Denies other relevant systemic complaints.      History, medications, allergies reviewed as noted.    Allergies:  Pcn [penicillins]     Physical Examination:     Well-developed well-nourished patient alert oriented in no acute distress.  Exam performed, including scalp, head, neck, face,nails, hair, external eyes, including conjunctival mucosa, eyelids, lips external ears , arms, digits,palms.     Multiple light to medium brown, well marginated, uniformly pigmented, macules and papules 6 mm and less scattered on exam. pigmented lesions examined with dermoscopy benign-appearing patterns.     Waxy tannish keratotic papules scattered, cherry-red vascular papules scattered.    See map today's date for lesions noted .  See assessment and plan below for specific lesions.    Otherwise remarkable for lesions as noted on map.    See A/P  below for additional information:    Assessment / plan:    No orders of the defined types were placed in this encounter.      Meds & Refills for this Visit:  Requested Prescriptions     Signed Prescriptions Disp Refills    clotrimazole-betamethasone 1-0.05 % External Cream 45 g 2     Sig: Use bid to affected areas of skin         Encounter Diagnoses   Name Primary?    Dermatitis Yes    Lentigo     SK (seborrheic keratosis)      Multiple nevi     Benign neoplasm of skin, unspecified location     Ichthyosis vulgaris     Intertrigo        Dermatitis, eczema ichthyosis.  Continue topical steroids as needed    Intertrigo abdominal fold in particular, proximal thighs  Barrier cream such as Eucerin or Aveeno  Lotrisone initially  May use twice daily initially  Decrease to daily  Taper to 2-3 times weekly    Instructions reviewed  You may use the eczema creams Aveeno and Eucerin, CeraVe anti-itch, Aquaphor ointment to areas of itching as well as the betamethasone/clotrimazole cream.  The betamethasone clotrimazole you may use twice daily over the next 2 weeks.  Once itching improved I would try to decrease this to 1 time every other day as needed.  Please let us know if it improve over the next couple weeks     Benign-appearing nevi, no atypical features on dermoscopy reassurance given monitor.     Waxy tan keratotic papules lesions in areas of concern as noted reassurance given.  Benign nature discussed.  Possibility of cryo, alphahydroxy acids over-the-counter retinol's discussed.     No other susupicious lesions on todays  exam.    Please refer to map for specific lesions.  See additional diagnoses.  Pros cons of various therapies, risks benefits discussed.Pathophysiology discussed with patient.  Therapeutic options reviewed.  See  Medications in grid.  Instructions reviewed at length.    Benign nevi, seborrheic  keratoses, cherry angiomas:  Reassurance regarding other benign skin lesions.Signs and symptoms of skin cancer, ABCDE's of melanoma discussed with patient. Sunscreen (broad-spectrum, ideally mineral-based-UVA/UVB -SPF 30 or higher) use encouraged, sun protection/sun protective clothing, self exams reviewed Followup as noted RTC ---routine checkup    6 mos -one year or p.r.n.    Encounter Times   Including precharting, reviewing chart, prior notes obtaining history: 10 minutes, medical exam :10 minutes, notes on body map, plan,  counseling 10minutes My total time spent caring for the patient on the day of the encounter: 30 minutes     The patient indicates understanding of these issues and agrees to the plan.  The patient is asked to return as noted in follow-up/ above.    This note was generated using Dragon voice recognition software.  Please contact me regarding any confusion resulting from errors in recognition..  Note to patient and family: The 21st Century Cures Act makes medical notes like these available to patients. However, be advised this is a medical document. It is intended as [id number]-cbks communication and monitoring of a patient's care needs. It is written in medical language and may contain abbreviations or verbiage that are unfamiliar. It may appear blunt or direct. Medical documents are intended to carry relevant information, facts as evident and the clinical opinion of the practitioner.         [1]   Allergies  Allergen Reactions    Pcn [Penicillins] RASH

## 2024-11-02 NOTE — PATIENT INSTRUCTIONS
You may use the eczema creams Aveeno and Eucerin, CeraVe anti-itch, Aquaphor ointment to areas of itching as well as the betamethasone/clotrimazole cream.  The betamethasone clotrimazole you may use twice daily over the next 2 weeks.  Once itching improved I would try to decrease this to 1 time every other day as needed.  Please let us know if it improve over the next couple weeks

## 2024-11-12 RX ORDER — EMPAGLIFLOZIN 25 MG/1
TABLET, FILM COATED ORAL DAILY
Qty: 90 TABLET | Refills: 0 | Status: SHIPPED | OUTPATIENT
Start: 2024-11-12

## 2024-11-12 NOTE — TELEPHONE ENCOUNTER
Endocrine Refill protocol for oral and injectable diabetic medications    Protocol Criteria:  PASSED  Reason: N/A    If all below requirements are met, send a 90-day supply with 1 refill per provider protocol.    Verify appointment with Endocrinology completed in the last 6 months or scheduled in the next 3 months.  Verify A1C has been completed within the last 6 months and is below 8.5%     Last completed office visit: 10/23/2024 Lary Griffin APRN   Next scheduled Follow up:   Future Appointments   Date Time Provider Department Center   1/27/2025  9:15 AM Lary Griffin APRN [facility]      Last A1c result: Last A1c value was 6.9% done 10/23/2024.

## 2024-11-21 DIAGNOSIS — E78.5 HYPERLIPIDEMIA, UNSPECIFIED HYPERLIPIDEMIA TYPE: ICD-10-CM

## 2024-11-26 RX ORDER — ROSUVASTATIN CALCIUM 10 MG/1
10 TABLET, COATED ORAL EVERY EVENING
Qty: 90 TABLET | Refills: 3 | Status: SHIPPED | OUTPATIENT
Start: 2024-11-26

## 2024-11-26 NOTE — TELEPHONE ENCOUNTER
Refill passed per [facility] protocol.    Requested Prescriptions   Pending Prescriptions Disp Refills    ROSUVASTATIN 10 MG Oral Tab [Pharmacy Med Name: Rosuvastatin Calcium 10 MG Oral Tablet] 90 tablet 0     Sig: TAKE 1 TABLET BY MOUTH ONCE DAILY IN THE EVENING       Cholesterol Medication Protocol Passed - 11/26/2024  9:51 AM        Passed - ALT < 80     Lab Results   Component Value Date    ALT 12 07/10/2024             Passed - ALT resulted within past year        Passed - Lipid panel within past 12 months     Lab Results   Component Value Date    CHOLEST 161 07/10/2024    TRIG 89 07/10/2024    HDL 78 (H) 07/10/2024    LDL 67 07/10/2024    VLDL 13 07/10/2024    NONHDLC 83 07/10/2024             Passed - In person appointment or virtual visit in the past 12 mos or appointment in next 3 mos     Recent Outpatient Visits              3 weeks ago Dermatitis    [facility], Marlin Ruiz MD    Office Visit    1 month ago Type 2 diabetes mellitus with both eyes affected by mild nonproliferative retinopathy without macular edema, without long-term current use of insulin ([facility]    [facility]    3 months ago Type 2 diabetes mellitus with both eyes affected by mild nonproliferative retinopathy without macular edema, without long-term current use of insulin ([facility])    [facility] Lary Davalos, GISELA    Office Visit    3 months ago Type 2 diabetes mellitus without complication, without long-term current use of insulin ([facility]    Office Visit    4 months ago Essential hypertension    [facility], Claudio Aaron MD    Office Visit          Future Appointments         Provider Department Appt Notes    In 2 months Elias  GISELA Barth [facility] Return in about 3 months (around 1/23/2025) for follow up.                       Future Appointments         Provider Department Appt Notes    In 2 months Lary Griffin APRN [facility] [place] Return in about 3 months (around 1/23/2025) for follow up.          Recent Outpatient Visits              3 weeks ago Dermatitis    [facility], Marlin Ruiz MD    Office Visit    1 month ago Type 2 diabetes mellitus with both eyes affected by mild nonproliferative retinopathy without macular edema, without long-term current use of insulin ([facility])    [facility] Lary Davalos APRN    Office Visit    3 months ago Type 2 diabetes mellitus with both eyes affected by mild nonproliferative retinopathy without macular edema, without long-term current use of insulin ([facility])    [facility], Lary Davalos APRN    Office Visit    3 months ago Type 2 diabetes mellitus without complication, without long-term current use of insulin ([facility]    Office Visit    4 months ago Essential hypertension    [facility], Claudio Aaron MD    Office Visit

## 2024-12-12 RX ORDER — AMLODIPINE BESYLATE 10 MG/1
10 TABLET ORAL DAILY
Qty: 90 TABLET | Refills: 3 | Status: SHIPPED | OUTPATIENT
Start: 2024-12-12

## 2024-12-12 NOTE — TELEPHONE ENCOUNTER
Please Review. Protocol Failed; No Protocol   BP Readings from Last 1 Encounters:   10/23/24 150/81     Requested Prescriptions   Pending Prescriptions Disp Refills    AMLODIPINE 10 MG Oral Tab [Pharmacy Med Name: amLODIPine Besylate 10 MG Oral Tablet] 90 tablet 0     Sig: Take 1 tablet by mouth once daily       Hypertension Medications Protocol Failed - 12/12/2024 12:37 PM        Failed - Last BP reading less than 140/90     BP Readings from Last 1 Encounters:   10/23/24 150/81               Passed - CMP or BMP in past 12 months        Passed - In person appointment or virtual visit in the past 12 mos or appointment in next 3 mos     Recent Outpatient Visits              1 month ago Dermatitis    [facility], Marlin Ruiz MD    Office Visit    1 month ago Type 2 diabetes mellitus with both eyes affected by mild nonproliferative retinopathy without macular edema, without long-term current use of insulin ([facility])    [facility] Lary Griffin APRN    Office Visit    3 months ago Type 2 diabetes mellitus with both eyes affected by mild nonproliferative retinopathy without macular edema, without long-term current use of insulin ([facility])    [facility] Lary Griffin APRN    Office Visit    4 months ago Type 2 diabetes mellitus without complication, without long-term current use of insulin ([facility])    [facility] Talya Santiago DPM    Office Visit    5 months ago Essential hypertension    [facility] Claudio Bermudez MD    Office Visit          Future Appointments         Provider Department Appt Notes    In 1 month Lary Griffin APRN [facility] Return in about 3 months (around 1/23/2025) for follow up.                    Passed - EGFRCR or GFRNAA > 50     GFR  Evaluation  EGFRCR: 62 , resulted on 7/10/2024                 Future Appointments         Provider Department Appt Notes    In 1 month [facility] Griffin APRN [facility] Return in about 3 months (around 1/23/2025) for follow up.          Recent Outpatient Visits              1 month ago Dermatitis    [facility]    Office Visit    1 month ago Type 2 diabetes mellitus with both eyes affected by mild nonproliferative retinopathy without macular edema, without long-term current use of insulin ([facility])    [facility] Lary Griffin APRN    Office Visit    3 months ago Type 2 diabetes mellitus with both eyes affected by mild nonproliferative retinopathy without macular edema, without long-term current use of insulin ([facility])    [facility]    Office Visit    4 months ago Type 2 diabetes mellitus without complication, without long-term current use of insulin ([facility])    [facility] Talya Santiago DPM    Office Visit    5 months ago Essential hypertension    [facility]    [facility]

## 2025-01-10 ENCOUNTER — OFFICE VISIT (OUTPATIENT)
Dept: INTERNAL MEDICINE CLINIC | Facility: CLINIC | Age: 70
End: 2025-01-10

## 2025-01-10 VITALS
TEMPERATURE: 98 F | DIASTOLIC BLOOD PRESSURE: 72 MMHG | HEART RATE: 77 BPM | OXYGEN SATURATION: 98 % | BODY MASS INDEX: 32.89 KG/M2 | HEIGHT: 58.5 IN | WEIGHT: 161 LBS | SYSTOLIC BLOOD PRESSURE: 132 MMHG

## 2025-01-10 DIAGNOSIS — J21.9 ACUTE BRONCHIOLITIS DUE TO UNSPECIFIED ORGANISM: Primary | ICD-10-CM

## 2025-01-10 PROCEDURE — 1159F MED LIST DOCD IN RCRD: CPT | Performed by: STUDENT IN AN ORGANIZED HEALTH CARE EDUCATION/TRAINING PROGRAM

## 2025-01-10 PROCEDURE — 1160F RVW MEDS BY RX/DR IN RCRD: CPT | Performed by: STUDENT IN AN ORGANIZED HEALTH CARE EDUCATION/TRAINING PROGRAM

## 2025-01-10 PROCEDURE — 99213 OFFICE O/P EST LOW 20 MIN: CPT | Performed by: STUDENT IN AN ORGANIZED HEALTH CARE EDUCATION/TRAINING PROGRAM

## 2025-01-10 RX ORDER — DOXYCYCLINE 100 MG/1
100 CAPSULE ORAL 2 TIMES DAILY WITH MEALS
Qty: 10 CAPSULE | Refills: 0 | Status: SHIPPED | OUTPATIENT
Start: 2025-01-10 | End: 2025-01-15

## 2025-01-10 RX ORDER — CODEINE PHOSPHATE AND GUAIFENESIN 10; 100 MG/5ML; MG/5ML
10 SOLUTION ORAL EVERY 6 HOURS PRN
Qty: 118 ML | Refills: 0 | Status: SHIPPED | OUTPATIENT
Start: 2025-01-10 | End: 2025-01-17

## 2025-01-10 NOTE — PROGRESS NOTES
OFFICE NOTE     Patient ID: Rossy Rogers is a 69 year old female.  Today's Date: 01/10/25  Chief Complaint: Cough (X3 weeks)    Pt is a 69y/o female with DM (followed by Lary HIGGINS), HTN, HLD, (cerumen impaction followed by Dr. Nixon 12/2023), bilateral foot pain/Porokeratosis (seen by Podiatry Dr. Santiago) presents to clinic for cough.       Vitals:    01/10/25 1233   BP: 132/72   Pulse: 77   Temp: 97.8 °F (36.6 °C)   TempSrc: Oral   SpO2: 98%   Weight: 161 lb (73 kg)   Height: 4' 10.5\" (1.486 m)     body mass index is 33.08 kg/m².  BP Readings from Last 3 Encounters:   01/10/25 132/72   10/23/24 150/81   08/21/24 138/75     The 10-year ASCVD risk score (Lucian WOODRUFF, et al., 2019) is: 21.5%    Values used to calculate the score:      Age: 69 years      Sex: Female      Is Non- : No      Diabetic: Yes      Tobacco smoker: No      Systolic Blood Pressure: 132 mmHg      Is BP treated: Yes      HDL Cholesterol: 78 mg/dL      Total Cholesterol: 161 mg/dL      Medications reviewed:  Current Outpatient Medications   Medication Sig Dispense Refill    doxycycline 100 MG Oral Cap Take 1 capsule (100 mg total) by mouth 2 (two) times daily with meals for 5 days. 10 capsule 0    guaiFENesin-codeine 100-10 MG/5ML Oral Solution Take 10 mL by mouth every 6 (six) hours as needed for cough. 118 mL 0    amLODIPine 10 MG Oral Tab Take 1 tablet (10 mg total) by mouth daily. 90 tablet 3    METFORMIN 500 MG Oral Tab TAKE 2 TABLETS BY MOUTH TWICE DAILY WITH MEALS 360 tablet 0    rosuvastatin 10 MG Oral Tab Take 1 tablet (10 mg total) by mouth every evening. 90 tablet 3    JARDIANCE 25 MG Oral Tab Take 1 tablet by mouth once daily 90 tablet 0    clotrimazole-betamethasone 1-0.05 % External Cream Use bid to affected areas of skin 45 g 2    GLIMEPIRIDE 2 MG Oral Tab TAKE 2 TABLETS BY MOUTH WITH BREAKFAST AND 1 TABLET WITH SUPPER 270 tablet 0    pioglitazone 30 MG Oral Tab Take 1 tablet  (30 mg total) by mouth daily. 90 tablet 1    triamcinolone 0.1 % External Cream Apply topically 2 (two) times daily as needed. 80 g 0    loratadine 10 MG Oral Tab Take 1 tablet (10 mg total) by mouth daily. 30 tablet 3    CLOTRIMAZOLE-BETAMETHASONE 1-0.05 % External Cream APPLY  CREAM TOPICALLY TWICE DAILY 15 g 0    Glucose Blood (ACCU-CHEK BEATRICE PLUS) In Vitro Strip Check blood sugar twice daily 200 strip 0    Lancets Thin Does not apply Misc Check blood sugar twice daily 200 each 2    Blood Glucose Monitoring Suppl (ACCU-CHEK BEATRICE PLUS) w/Device Does not apply Kit Check blood sugar twice daily 1 kit 0    CALCIUM OR Take by mouth.      aspirin 81 MG Oral Tab Take 1 tablet (81 mg total) by mouth daily.      Multiple Vitamins-Minerals (WOMENS MULTIVITAMIN PLUS) Oral Tab Take  by mouth.      Tirzepatide (MOUNJARO) 7.5 MG/0.5ML Subcutaneous Solution Auto-injector Inject 7.5 mg into the skin once a week. (Patient not taking: Reported on 1/10/2025) 6 mL 0    Tirzepatide (MOUNJARO) 2.5 MG/0.5ML Subcutaneous Solution Pen-injector Inject 2.5 mg into the skin once a week. (Patient not taking: Reported on 1/10/2025) 2 mL 0    Tirzepatide (MOUNJARO) 5 MG/0.5ML Subcutaneous Solution Pen-injector Inject 5 mg into the skin once a week. (Patient not taking: Reported on 1/10/2025) 2 mL 0         Assessment & Plan    1. Acute bronchiolitis due to unspecified organism (Primary)  -     Doxycycline Hyclate; Take 1 capsule (100 mg total) by mouth 2 (two) times daily with meals for 5 days.  Dispense: 10 capsule; Refill: 0  -     guaiFENesin-Codeine; Take 10 mL by mouth every 6 (six) hours as needed for cough.  Dispense: 118 mL; Refill: 0  Pt with Acute Cough (<3 weeks) likely triggered from recent URI with burdensome symptoms  -Will start Guaifenesin-codeine 5mg po q6hrs PRN for cough, 30ml no refills, educated to monitor for oversedation.    -Startdoxycycline 100mg po BID for 5 days or  CAP coverage and MRSA/Atypical coverage.   -pt  educated while taking antibiotics should also take OTC priobiotics daily and/or natural probiotics such as greek yogurt/Kefir to help prevent development of C.Diff.  -if no improvement follow up in 10 days    Follow Up: As needed/if symptoms worsen or Return if symptoms worsen or fail to improve..       Objective/ Results:   Physical Exam  Constitutional:       Appearance: She is well-developed.   Cardiovascular:      Rate and Rhythm: Normal rate and regular rhythm.      Heart sounds: Normal heart sounds.   Pulmonary:      Effort: Pulmonary effort is normal.      Breath sounds: Wheezing and rhonchi present.   Abdominal:      General: Bowel sounds are normal.      Palpations: Abdomen is soft.   Skin:     General: Skin is warm and dry.   Neurological:      Mental Status: She is alert and oriented to person, place, and time.      Deep Tendon Reflexes: Reflexes are normal and symmetric.        Reviewed:    Patient Active Problem List    Diagnosis    Severe obesity (BMI 35.0-39.9) with comorbidity (HCC)    Type 2 diabetes mellitus, without long-term current use of insulin (HCC)    Hyperlipidemia      Allergies[1]     Social History     Socioeconomic History    Marital status:    Tobacco Use    Smoking status: Never     Passive exposure: Never    Smokeless tobacco: Never   Vaping Use    Vaping status: Never Used   Substance and Sexual Activity    Alcohol use: Yes     Comment: 1-2 glasses yearly    Drug use: No   Other Topics Concern    Caffeine Concern Yes     Comment: 2 cups tea/coffee daily    Pt has a pacemaker No    Pt has a defibrillator No    Reaction to local anesthetic No      Review of Systems   Constitutional: Negative.    Respiratory:  Positive for cough and chest tightness. Negative for choking, shortness of breath and wheezing.    Cardiovascular: Negative.    Gastrointestinal: Negative.    Skin: Negative.    Neurological: Negative.      All other systems negative unless otherwise stated in ROS or HPI  above.       Rinku Marie MD  Internal Medicine       Call office with any questions or seek emergency care if necessary.   Patient understands and agrees to follow directions and advice.      ----------------------------------------- PATIENT INSTRUCTIONS-----------------------------------------     There are no Patient Instructions on file for this visit.        The 21st Century Cures Act makes medical notes available to patients in the interest of transparency.  However, please be advised that this is a medical document.  It is intended as a peer to peer communication.  It is written in medical language and may contain abbreviations or verbiage that are technical and unfamiliar.  It may appear blunt or direct.  Medical documents are intended to carry relevant information, facts as evident, and the clinical opinion of the practitioner.          [1]   Allergies  Allergen Reactions    Pcn [Penicillins] RASH

## 2025-01-27 ENCOUNTER — OFFICE VISIT (OUTPATIENT)
Dept: ENDOCRINOLOGY CLINIC | Facility: CLINIC | Age: 70
End: 2025-01-27

## 2025-01-27 VITALS
WEIGHT: 160 LBS | BODY MASS INDEX: 33.58 KG/M2 | HEIGHT: 58 IN | SYSTOLIC BLOOD PRESSURE: 136 MMHG | HEART RATE: 90 BPM | DIASTOLIC BLOOD PRESSURE: 70 MMHG

## 2025-01-27 DIAGNOSIS — E11.3293 TYPE 2 DIABETES MELLITUS WITH BOTH EYES AFFECTED BY MILD NONPROLIFERATIVE RETINOPATHY WITHOUT MACULAR EDEMA, WITHOUT LONG-TERM CURRENT USE OF INSULIN (HCC): Primary | ICD-10-CM

## 2025-01-27 LAB
GLUCOSE BLOOD: 158
HEMOGLOBIN A1C: 8.5 % (ref 4.3–5.6)
TEST STRIP LOT #: NORMAL NUMERIC

## 2025-01-27 PROCEDURE — 1160F RVW MEDS BY RX/DR IN RCRD: CPT | Performed by: NURSE PRACTITIONER

## 2025-01-27 PROCEDURE — 99214 OFFICE O/P EST MOD 30 MIN: CPT | Performed by: NURSE PRACTITIONER

## 2025-01-27 PROCEDURE — 82947 ASSAY GLUCOSE BLOOD QUANT: CPT | Performed by: NURSE PRACTITIONER

## 2025-01-27 PROCEDURE — 1159F MED LIST DOCD IN RCRD: CPT | Performed by: NURSE PRACTITIONER

## 2025-01-27 PROCEDURE — 83036 HEMOGLOBIN GLYCOSYLATED A1C: CPT | Performed by: NURSE PRACTITIONER

## 2025-01-27 NOTE — PROGRESS NOTES
Name: Rossy Rogers  Date: 2025    CHIEF COMPLAINT   Chief Complaint   Patient presents with    Diabetes       HISTORY OF PRESENT ILLNESS   Rossy Rogers is a 69 year old female who presents for follow up on diabetes management.   Hgb A1C: 6.9% at POC today. Previously was 6.9% on 10/23/2024.   Blood glucose is: 135 in clinic today.   Patient notes that she has been feeling well and denies any health changes or taking new medications since last office visit. She continues to follow a low carb diet and staying active per usual. She has been compliant with taking all diabetes medications.     FAMILY HISTORY OF DIABETES  -all sisters- T2DM  DIABETES HISTORY  Diagnosed: around age 41  Prior HbA, C or glycohemoglobin were 9.3% on 2021; 8.9% on 2021; 8.9% 2021; 7.8% 2022; 9.7% 2022; 9.3% 2022; 8.9% 2022; 8.3% 2023; 8.4% 2023; 7.3%  2023; 7.4% 2023; 9.2% 3/2024; 8.7% 7/10/2024; 9.0% 2024; 6.9% 10/23/2024; 6.9% at POC today;     Patient has not had hospitalizations for blood sugar issues.   Denies any history of pancreatitis.     PREVIOUS MEDICATION FOR DM:  Trulicity- d/c'ed due to patient not wanting to use injectable medication;    -Jardiance- unable to use due to high cost   - glimepiride -d/c'ed due to hypoglycemia   -Pioglitazone- d/c'ed due to leg edema   - Mounjaro-- has not taken due to high cost     CURRENT MEDICATIONS FOR DM:  - Metformin 1,000mg with lunch            1,000mg with dinner     - Jardiance 25mg daily in AM   - Glimepiride 2mg (1 tablet) daily with first meal (lunch)    HOME GLUCOSE READINGS:   Testinx daily   Fastin-105   Hypoglycemia: yes - around 10am, 5pm or 6pm around 2-3x weekly occurring; has noticed this based on symptoms    HISTORY OF DIABETES COMPLICATIONS:  History of Retinopathy: yes mild- bilateral - last eye exam within the last 12 months: no - last exam was on 2023- followed by Dr. Sue  Isrrael  History of Neuropathy: no  History of Nephropathy: no    ASSOCIATED COMPLICATIONS:   HTN: yes   Hyperlipidemia: yes   Coronary Artery Disease: no  Cerebrovascular Disease: no  Peripheral Vascular Disease: no      DIETARY COMPLIANCE:  Good; has been working on reducing rice and following a low carb diet more consistently   - tries follow low carb portions   - drinking 3 cups of coffee a day   + water   - denies eating sweets  - occ eating fruit     EXERCISE:   Yes - kick boxing 3x weekly and walking with silver sneakers 2x weekly     Polyuria, polyphagia, polydipsia: no   Paresthesias: no   Blurred vision: no   Recent steroids, illness or infections: no     REVIEW OF SYSTEMS  Constitutional: Negative for: weight change, fever, fatigue, cold/heat intolerance  Eyes: Negative for:  Visual changes, proptosis, blurring  ENT: Negative for:  dysphagia, neck swelling, dysphonia  Respiratory: Negative for: hemoptysis, shortness of breath, cough, or dyspnea.  Cardiovascular: Negative for:  chest pain, chest discomfort, palpitations  GI: Negative for:  abdominal pain, nausea, vomiting, diarrhea, heartburn, constipation  Neurology: Negative for: headache, dizziness, syncope, numbness/tingling, or weakness.   Genito-Urinary: Negative for: dysuria, frequency or hematuria   Hematology/Lymphatics: Negative for: bruising, easy bleeding, lower extremity edema  Endocrine: Negative for: polyuria, polydipsia. No thyroid disease. No osteoporosis.   Skin: Negative for: rash, blister, infection or ulcers.    MEDICATIONS:     Current Outpatient Medications:     METFORMIN 500 MG Oral Tab, TAKE 2 TABLETS BY MOUTH TWICE DAILY WITH MEALS, Disp: 360 tablet, Rfl: 0    JARDIANCE 25 MG Oral Tab, Take 1 tablet by mouth once daily, Disp: 90 tablet, Rfl: 0    GLIMEPIRIDE 2 MG Oral Tab, TAKE 2 TABLETS BY MOUTH WITH BREAKFAST AND 1 TABLET WITH SUPPER, Disp: 270 tablet, Rfl: 0    pioglitazone 30 MG Oral Tab, Take 1 tablet (30 mg total) by mouth  daily., Disp: 90 tablet, Rfl: 1    amLODIPine 10 MG Oral Tab, Take 1 tablet (10 mg total) by mouth daily., Disp: 90 tablet, Rfl: 3    rosuvastatin 10 MG Oral Tab, Take 1 tablet (10 mg total) by mouth every evening., Disp: 90 tablet, Rfl: 3    clotrimazole-betamethasone 1-0.05 % External Cream, Use bid to affected areas of skin, Disp: 45 g, Rfl: 2    Tirzepatide (MOUNJARO) 7.5 MG/0.5ML Subcutaneous Solution Auto-injector, Inject 7.5 mg into the skin once a week. (Patient not taking: Reported on 1/27/2025), Disp: 6 mL, Rfl: 0    Tirzepatide (MOUNJARO) 2.5 MG/0.5ML Subcutaneous Solution Pen-injector, Inject 2.5 mg into the skin once a week. (Patient not taking: Reported on 1/27/2025), Disp: 2 mL, Rfl: 0    Tirzepatide (MOUNJARO) 5 MG/0.5ML Subcutaneous Solution Pen-injector, Inject 5 mg into the skin once a week. (Patient not taking: Reported on 1/27/2025), Disp: 2 mL, Rfl: 0    triamcinolone 0.1 % External Cream, Apply topically 2 (two) times daily as needed., Disp: 80 g, Rfl: 0    loratadine 10 MG Oral Tab, Take 1 tablet (10 mg total) by mouth daily., Disp: 30 tablet, Rfl: 3    CLOTRIMAZOLE-BETAMETHASONE 1-0.05 % External Cream, APPLY  CREAM TOPICALLY TWICE DAILY, Disp: 15 g, Rfl: 0    Glucose Blood (ACCU-CHEK BEATRICE PLUS) In Vitro Strip, Check blood sugar twice daily, Disp: 200 strip, Rfl: 0    Lancets Thin Does not apply Misc, Check blood sugar twice daily, Disp: 200 each, Rfl: 2    Blood Glucose Monitoring Suppl (ACCU-CHEK BEATRICE PLUS) w/Device Does not apply Kit, Check blood sugar twice daily, Disp: 1 kit, Rfl: 0    CALCIUM OR, Take by mouth., Disp: , Rfl:     aspirin 81 MG Oral Tab, Take 1 tablet (81 mg total) by mouth daily., Disp: , Rfl:     Multiple Vitamins-Minerals (WOMENS MULTIVITAMIN PLUS) Oral Tab, Take  by mouth., Disp: , Rfl:     ALLERGIES:   Allergies   Allergen Reactions    Pcn [Penicillins] RASH       SOCIAL HISTORY:   Social History     Socioeconomic History    Marital status:    Tobacco Use     Smoking status: Never     Passive exposure: Never    Smokeless tobacco: Never   Vaping Use    Vaping status: Never Used   Substance and Sexual Activity    Alcohol use: Yes     Comment: 1-2 glasses yearly    Drug use: No   Other Topics Concern    Caffeine Concern Yes     Comment: 2 cups tea/coffee daily    Pt has a pacemaker No    Pt has a defibrillator No    Reaction to local anesthetic No       PAST MEDICAL HISTORY:   Past Medical History:    Diabetes (HCC)    Elevated lipids    Hyperlipidemia    High blood pressure    High cholesterol       PAST SURGICAL HISTORY:   Past Surgical History:   Procedure Laterality Date    Colonoscopy N/A 08/10/2021    Procedure: COLONOSCOPY;  Surgeon: Smith Winn MD;  Location: [facility] ENDOSCOPY    Colonoscopy & polypectomy      Electrocardiogram, complete  06/05/2014    Scanned to Media Tab    Hysterectomy      age 41     PHYSICAL EXAM:   Vitals:    01/27/25 0928 01/27/25 0955   BP: 156/82 136/70   Pulse: 90    Weight: 160 lb (72.6 kg)    Height: 4' 10\" (1.473 m)        BMI:   Body mass index is 33.44 kg/m².    General Appearance:  alert, well developed, in no acute distress  Nutritional:  no extreme weight gain or loss  Head: Atraumatic  Eyes:  normal conjunctivae, sclera., normal sclera and normal pupils  Throat/Neck: normal sound to voice. Normal hearing, normal speech  Back: no kyphosis  Respiratory:  Speaking in full sentences, non-labored. no increased work of breathing, no audible wheezing    Skin:  normal moisture and skin texture;   Hair and nails: normal scalp hair  Hematologic:  no excessive bruising  Neuro: motor grossly intact, moving all extremities without difficulty  Psychiatric:  oriented to time, self, and place  Extremities: no obvious extremity swelling, no lesions    LABS: Pertinent labs reviewed    ASSESSMENT/PLAN:    -Reviewed with patient the pathogenesis of diabetes, clinical significance of A1c, and common complications such as: microvascular,  macrovascular and diabetic ketoacidosis. Patient verbalizes understanding of the importance of glycemic control and the goals of therapy.     1.Type 2 Diabetes Mellitus, uncontrolled with hypoglycemia   -LAB DATA  HbA,C: 6.9% today  a) Medications                    -continue with Metformin 1,000mg with lunch          1,000mg with dinner  - stop Glimepiride   - continue with Jardiance 25mg once daily in AM       - discussed to cont. with following a low carb diet   - discussed to cont. With current exercise frequency   -reviewed target goal BG readings and A1C  -reviewed when to call clinic with abnormal BG readings.     b) No nephropathy: GFR: 62 on 7/10/2024 and urine MA: 43.9 on 7/10/2024  c) reviewed importance of annual optho exams-- last exam was 2023 -- referral entered for Dr. Xie (patient prefers location closer to home)  D) foot exam: normal today 10/23/2024  e) start testing BG 1x daily- alternate with fasting or 2hrs after dinner   f) Life style changes reviewed     2. Hypertension   - on Amlodipine 10mg daily and lisinopril 5mg daily   - above goal today; patient had not taken her BP medications today    2.hyperlipidemia   -LDL: 67 and Tri on 7/10/2024  -On rosuvastatin 10mg at bedtime       RTC in 4 months   Patient instructed to call sooner if they develop Blood glucose readings <75 and/or if they have readings persistently >200.     The risks and benefits of my recommendations were discussed with the patient today. questions were also answered to the best of my knowledge. Patient verbalizes understanding of these issues and agrees to the plan.    2025  GISELA Francois

## 2025-01-27 NOTE — PATIENT INSTRUCTIONS
A1C: 6.9% today --> previously was 6.9% on 10/23/2024  Blood glucose: 135 in clinic today    Medications:   -continue with Metformin   1,000mg with lunch   1,000mg with dinner  - stop Glimepiride   - continue with Jardiance 25mg once daily in the morning       - continue to stay active as currently doing   - continue to follow a low carb diet   - try to decrease coffee to 2 cups per day       Weight:  Wt Readings from Last 6 Encounters:   01/27/25 160 lb (72.6 kg)   01/10/25 161 lb (73 kg)   10/23/24 156 lb 9.6 oz (71 kg)   08/21/24 150 lb (68 kg)   07/10/24 150 lb (68 kg)   03/19/24 155 lb (70.3 kg)     A1C goal:  <7.5%    Blood sugar testing:  Test your blood sugar 1 time daily   Recommended times to test: alternat with before breakfast (fasting) or 2hrs after meals     Blood sugar targets:  Before breakfast:  (preferably < 110)  2 hours after meals: <180 (preferably <150)     Call for persistent blood sugars < 75 or > 200

## 2025-02-12 RX ORDER — EMPAGLIFLOZIN 25 MG/1
TABLET, FILM COATED ORAL DAILY
Qty: 90 TABLET | Refills: 1 | Status: SHIPPED | OUTPATIENT
Start: 2025-02-12

## 2025-02-12 NOTE — TELEPHONE ENCOUNTER
Endocrine Refill protocol for oral and injectable diabetic medications    Protocol Criteria:  PASSED      If all below requirements are met, send a 90-day supply with 1 refill per provider protocol.    Verify appointment with Endocrinology completed in the last 6 months or scheduled in the next 3 months.  Verify A1C has been completed within the last 6 months and is below 8.5%     Last completed office visit: 1/27/2025 Lary Griffin APRN   Next scheduled Follow up:   Future Appointments   Date Time Provider Department Center   5/28/2025  8:45 AM Lary Griffin APRN [facility]      Last A1c result: Last A1c value was 8.5% done 1/27/2025.

## 2025-02-26 ENCOUNTER — TELEPHONE (OUTPATIENT)
Dept: ENDOCRINOLOGY CLINIC | Facility: CLINIC | Age: 70
End: 2025-02-26

## 2025-03-03 RX ORDER — PIOGLITAZONE 30 MG/1
30 TABLET ORAL DAILY
Qty: 90 TABLET | Refills: 3 | Status: SHIPPED | OUTPATIENT
Start: 2025-03-03

## 2025-03-03 NOTE — TELEPHONE ENCOUNTER
Please review.  Protocol failed/has no protocol.    Last Office Visit: 07/10/2024   Component  Ref Range & Units 7/10/24 11:43 AM   HgbA1C  <5.7 % 8.7 High    Comment:  Normal HbA1C:     <5.7%   Pre-Diabetic:     5.7 - 6.4%   Diabetic:         >6.4%   Diabetic Control: <7.0%

## 2025-03-04 ENCOUNTER — TELEPHONE (OUTPATIENT)
Dept: ENDOCRINOLOGY CLINIC | Facility: CLINIC | Age: 70
End: 2025-03-04

## 2025-03-04 NOTE — TELEPHONE ENCOUNTER
Patient wants ANA Griffin to know that she is no longer feeling hungry, no more shaking and no more dizziness, due to the adjustment in her medication.

## 2025-03-07 NOTE — TELEPHONE ENCOUNTER
Update noted. Ok to hold off from taking jardinace. Her glucose readings are stable currently.     Agree with recommendation with being diligent with lifestyle (diet/exercise). I would also recommend that she starts to alternate BG testing times with fasting or 2hrs after meals to ensure that glucose readings also stay stable during the day.     If glucose readings increase and are persistently >150 fasting or persistently >180 2hrs after meals, she should call and notify me.       Thank you!

## 2025-03-07 NOTE — TELEPHONE ENCOUNTER
Radha,     Patient is not taking Jardiance right now due to cost, $900. Last dose was one week ago. Patient reports compliance with MTF 1000 mg BID (lunch and dinner)    BG (Checking 1x daily)  After BF  3/7 110  3/6 87  3/5 94    Patient states she exercises 4 times a week. Emphasized importance of low carb, high protein meals and to continue daily activity.

## 2025-03-08 DIAGNOSIS — E11.69 TYPE 2 DIABETES MELLITUS WITH OTHER SPECIFIED COMPLICATION, WITHOUT LONG-TERM CURRENT USE OF INSULIN (HCC): Primary | ICD-10-CM

## 2025-03-10 NOTE — TELEPHONE ENCOUNTER
This refill request is being sent to the provider for the following reason:  [x]Patient has not had an appointment within the past 12 months but has made an appointment on: __no  follow up scheduled _  []Medication is not within protocol  []Patient did not complete follow up recommendations  []Other: ___

## 2025-03-11 NOTE — TELEPHONE ENCOUNTER
Spoke to patient to relay message from GISELA Garcia - patient stated understanding to hold jardiance at this time  Patient stated understanding to alternate checking BG and call if fasting BG >150 or after meals >180  Patient has apt 5/28/25

## 2025-03-11 NOTE — TELEPHONE ENCOUNTER
Last visit with provider: 1/27/25    Next visit: 5/28/25    GFR: 62 on 7/10/2024 and urine MA: 43.9 on 7/10/2024     A1C: 6.9% today --> previously was 6.9% on 10/23/2024  Blood glucose: 135 in clinic today     Medications:   -continue with Metformin   1,000mg with lunch            1,000mg with dinner

## 2025-03-20 RX ORDER — LORATADINE 10 MG/1
10 TABLET ORAL DAILY
Qty: 30 TABLET | Refills: 0 | OUTPATIENT
Start: 2025-03-20

## 2025-03-27 PROBLEM — E66.01 SEVERE OBESITY (BMI 35.0-39.9) WITH COMORBIDITY (HCC): Chronic | Status: RESOLVED | Noted: 2024-10-23 | Resolved: 2025-03-27

## 2025-04-02 ENCOUNTER — OFFICE VISIT (OUTPATIENT)
Dept: INTERNAL MEDICINE CLINIC | Facility: CLINIC | Age: 70
End: 2025-04-02

## 2025-04-02 VITALS
SYSTOLIC BLOOD PRESSURE: 136 MMHG | HEIGHT: 58.5 IN | HEART RATE: 76 BPM | BODY MASS INDEX: 32.08 KG/M2 | DIASTOLIC BLOOD PRESSURE: 70 MMHG | WEIGHT: 157 LBS | TEMPERATURE: 98 F

## 2025-04-02 DIAGNOSIS — I10 ESSENTIAL HYPERTENSION: ICD-10-CM

## 2025-04-02 DIAGNOSIS — Z12.31 VISIT FOR SCREENING MAMMOGRAM: ICD-10-CM

## 2025-04-02 DIAGNOSIS — E78.5 HYPERLIPIDEMIA, UNSPECIFIED HYPERLIPIDEMIA TYPE: ICD-10-CM

## 2025-04-02 DIAGNOSIS — Z00.00 MEDICARE ANNUAL WELLNESS VISIT, SUBSEQUENT: Primary | ICD-10-CM

## 2025-04-02 DIAGNOSIS — Z78.0 POSTMENOPAUSAL: ICD-10-CM

## 2025-04-02 DIAGNOSIS — E66.811 CLASS 1 OBESITY DUE TO EXCESS CALORIES WITH SERIOUS COMORBIDITY AND BODY MASS INDEX (BMI) OF 32.0 TO 32.9 IN ADULT: ICD-10-CM

## 2025-04-02 DIAGNOSIS — E11.69 TYPE 2 DIABETES MELLITUS WITH OTHER SPECIFIED COMPLICATION, WITHOUT LONG-TERM CURRENT USE OF INSULIN (HCC): ICD-10-CM

## 2025-04-02 DIAGNOSIS — E55.9 VITAMIN D DEFICIENCY: ICD-10-CM

## 2025-04-02 DIAGNOSIS — E66.09 CLASS 1 OBESITY DUE TO EXCESS CALORIES WITH SERIOUS COMORBIDITY AND BODY MASS INDEX (BMI) OF 32.0 TO 32.9 IN ADULT: ICD-10-CM

## 2025-04-02 PROCEDURE — 1125F AMNT PAIN NOTED PAIN PRSNT: CPT | Performed by: INTERNAL MEDICINE

## 2025-04-02 PROCEDURE — G0439 PPPS, SUBSEQ VISIT: HCPCS | Performed by: INTERNAL MEDICINE

## 2025-04-02 PROCEDURE — 99213 OFFICE O/P EST LOW 20 MIN: CPT | Performed by: INTERNAL MEDICINE

## 2025-04-02 PROCEDURE — 1170F FXNL STATUS ASSESSED: CPT | Performed by: INTERNAL MEDICINE

## 2025-04-02 PROCEDURE — 1159F MED LIST DOCD IN RCRD: CPT | Performed by: INTERNAL MEDICINE

## 2025-04-02 PROCEDURE — 96160 PT-FOCUSED HLTH RISK ASSMT: CPT | Performed by: INTERNAL MEDICINE

## 2025-04-03 NOTE — PROGRESS NOTES
Subjective:   Rossy Rogers is a 69 year old female who presents for a MA AHA (Medicare Advantage Annual Health Assessment) and scheduled follow up of multiple significant but stable problems.   Last seen 7/10/24  this is her  6 mo follow up    Diabetes   followed by Endo  HTN  hyperlipidemia  obesity. Body mass index is 32.25 kg/m².      sympotms  :        Headache no  dizziness        no                             Blurred vision no  palpitaionsSyncope no  Chest pain  no  PND  Orthopnea no  Weakness  No  Low salt diet    yes    Compliance with exercise stays active  participates in gyn exercise activity  [facility]  Compliance with medication yes                                          /70   Pulse 76   Temp 97.5 °F (36.4 °C) (Temporal)   Ht 4' 10.5\" (1.486 m)   Wt 157 lb (71.2 kg)   BMI 32.25 kg/m²   Wt Readings from Last 6 Encounters:   04/02/25 157 lb (71.2 kg)   01/27/25 160 lb (72.6 kg)   01/10/25 161 lb (73 kg)   10/23/24 156 lb 9.6 oz (71 kg)   08/21/24 150 lb (68 kg)   07/10/24 150 lb (68 kg)     Body mass index is 32.25 kg/m².  HGBA1C:    Lab Results   Component Value Date    A1C 8.5 (A) 01/27/2025    A1C 6.9 (A) 10/23/2024    A1C 9.0 (A) 08/21/2024     (H) 07/10/2024         History/Other:   Fall Risk Assessment:   She has been screened for Falls and is low risk.      Cognitive Assessment:   She had a completely normal cognitive assessment - see flowsheet entries     Functional Ability/Status:   Rossy Rogers has a completely normal functional assessment. See flowsheet for details.      Depression Screening (PHQ):  PHQ-2 SCORE: 0  , done 4/2/2025            Advanced Directives:   She does NOT have a Living Will. [Do you have a living will?: No]  She does NOT have a Power of  for Health Care. [Do you have a healthcare power of ?: No]  Discussed Advance Care Planning with patient (and family/surrogate if present). Standard forms made available to patient in After Visit  Summary.      Patient Active Problem List   Diagnosis    Type 2 diabetes mellitus, without long-term current use of insulin (HCC)    Hyperlipidemia     Allergies:  She is allergic to pcn [penicillins].    Current Medications:  Outpatient Medications Marked as Taking for the 4/2/25 encounter (Office Visit) with Claudio Bermudez MD   Medication Sig    METFORMIN 500 MG Oral Tab TAKE 2 TABLETS BY MOUTH TWICE DAILY WITH MEALS    PIOGLITAZONE 30 MG Oral Tab Take 1 tablet by mouth once daily    amLODIPine 10 MG Oral Tab Take 1 tablet (10 mg total) by mouth daily.    rosuvastatin 10 MG Oral Tab Take 1 tablet (10 mg total) by mouth every evening.    clotrimazole-betamethasone 1-0.05 % External Cream Use bid to affected areas of skin    loratadine 10 MG Oral Tab Take 1 tablet (10 mg total) by mouth daily.    Glucose Blood (ACCU-CHEK BEATRICE PLUS) In Vitro Strip Check blood sugar twice daily    Lancets Thin Does not apply Misc Check blood sugar twice daily    Blood Glucose Monitoring Suppl (ACCU-CHEK BEATRICE PLUS) w/Device Does not apply Kit Check blood sugar twice daily    CALCIUM OR Take by mouth.    aspirin 81 MG Oral Tab Take 1 tablet (81 mg total) by mouth daily.    Multiple Vitamins-Minerals (WOMENS MULTIVITAMIN PLUS) Oral Tab Take  by mouth.       Medical History:  She  has a past medical history of Diabetes (HCC), Elevated lipids, High blood pressure, and High cholesterol.  Surgical History:  She  has a past surgical history that includes electrocardiogram, complete (06/05/2014); colonoscopy & polypectomy; hysterectomy; and colonoscopy (N/A, 08/10/2021).   Family History:  Her family history includes Heart Disorder (age of onset: 51) in her brother; Pulmonary Disease (age of onset: 73) in her father; Uterine Cancer in her mother.  Social History:  She  reports that she has never smoked. She has never been exposed to tobacco smoke. She has never used smokeless tobacco. She reports current alcohol use. She reports that she  does not use drugs.    Tobacco:  She has never smoked tobacco.    CAGE Alcohol Screen:   CAGE screening score of 0 on 4/2/2025, showing low risk of alcohol abuse.      Patient Care Team:  Claudio Bermudez MD as PCP - General (Internal Medicine)    Review of Systems      Objective:   Physical Exam      /70   Pulse 76   Temp 97.5 °F (36.4 °C) (Temporal)   Ht 4' 10.5\" (1.486 m)   Wt 157 lb (71.2 kg)   BMI 32.25 kg/m²  Estimated body mass index is 32.25 kg/m² as calculated from the following:    Height as of this encounter: 4' 10.5\" (1.486 m).    Weight as of this encounter: 157 lb (71.2 kg).    Medicare Hearing Assessment:   Hearing Screening    Entry User: Clare Maldonado CMA  Screening Method: Questionnaire  I have a problem hearing over the telephone: No I have trouble following the conversations when two or more people are talking at the same time: No   I have trouble understanding things on the TV: No I have to strain to understand conversations: No   I have to worry about missing the telephone ring or doorbell: Sometimes I have trouble hearing conversations in a noisy background such as a crowded room or restaurant: Sometimes   I get confused about where sounds come from: No I misunderstand some words in a sentence and need to ask people to repeat themselves: Sometimes   I especially have trouble understanding the speech of women and children: Sometimes I have trouble understanding the speaker in a large room such as at a meeting or place of Hinduism: Sometimes   Many people I talk to seem to mumble (or don't speak clearly): Sometimes People get annoyed because I misunderstand what they say: Sometimes   I misunderstand what others are saying and make inappropriate responses: No I avoid social activities because I cannot hear well and fear I will reply improperly: No   Family members and friends have told me they think I may have hearing loss: Sometimes             Visual Acuity:   Right Eye Visual Acuity:  Uncorrected Right Eye Chart Acuity: 20/40   Left Eye Visual Acuity: Uncorrected Left Eye Chart Acuity: 20/70   Both Eyes Visual Acuity: Uncorrected Both Eyes Chart Acuity: 20/40   Able To Tolerate Visual Acuity: Yes        Assessment & Plan:   Rossy Rogers is a 69 year old female who presents for a Medicare Assessment.   (Z00.00) Medicare annual wellness visit, subsequent  (primary encounter diagnosis)  Plan: Patient is independent with ADL.s    Health screening   Colonoscopy, mammography, dexa scan  And routine eye exam advised.      (E78.5) Hyperlipidemia, unspecified hyperlipidemia type  Plan: CBC With Differential With Platelet, Comp         Metabolic Panel (14), Lipid Panel, TSH and Free        T4        Low cholesterol diet advised  Avoid saturated and trans fats       (I10) Essential hypertension  Plan: /70   Pulse 76   Temp 97.5 °F (36.4 °C) (Temporal)   Ht 4' 10.5\" (1.486 m)   Wt 157 lb (71.2 kg)   BMI 32.25 kg/m²     Limit salt intake  monitor home BP  Cont exercise and weight loss    (E11.69) Type 2 diabetes mellitus with other specified complication, without long-term current use of insulin (HCC)  Plan: Ophthalmology Referral - In Network, Hemoglobin        A1C, Urinalysis, Routine, Microalb/Creat Ratio,        Random Urine        Uncontrolled  followed by endo      HEALTHY DIET   LIMIT CARBOHYDRATE INTAKE  AVOID SWEETS  AVOID WHITES  POTATOES  RICE BREAD ETC  EXERCISE REGULARLY AS TOLERATED  MAINTAIN HEALTHY WEIGHT      Eye exam yearly      (E55.9) Vitamin D deficiency  Plan: supplement    (Z12.31) Visit for screening mammogram  Plan: [facility] JENNIFER 2D+3D SCREENING BILAT         (CPT=77067/11222)        .Breast exam.  Bilateral  No discrete palpable masses or tenderness    No nipple discharge  And no axillary adenopathy.  Self breast exam advised      (Z78.0) Postmenopausal  Plan: XR DEXA BONE DENSITOMETRY (CPT=77080)        Asymptomatic  monitor      (E66.811,  E66.09,  Z68.32) Class 1 obesity  due to excess calories with serious comorbidity and body mass index (BMI) of 32.0 to 32.9 in adult  Plan: Weight loss advised   limit overall caloric intake  Low diet  limit carbohydrate intake   increase activity  as tolerated  exercise    Medications and most recent test results reviewed  Refill medicaitons  as needed  Potential side effect discussed  Modification of risk for CAD advised    Dietary an lifestyle change  Pt voiced understanding and agrees with plan  Pt given time to ask questions  After Visit Summary handout    Discussed  And given to patient.          The patient indicates understanding of these issues and agrees to the plan.  Reinforced healthy diet, lifestyle, and exercise.      No follow-ups on file.     Claudio Bermudez MD, 4/2/2025     Supplementary Documentation:   General Health:  In the past six months, have you lost more than 10 pounds without trying?: 2 - No  Has your appetite been poor?: No  Type of Diet: Balanced  How does the patient maintain a good energy level?: Appropriate Exercise  How would you describe your daily physical activity?: Moderate  How would you describe your current health state?: Fair  How do you maintain positive mental well-being?: Visiting Friends  On a scale of 0 to 10, with 0 being no pain and 10 being severe pain, what is your pain level?: 1 - (Mild)  In the past six months, have you experienced urine leakage?: 0-No  At any time do you feel concerned for the safety/well-being of yourself and/or your children, in your home or elsewhere?: No  Have you had any immunizations at another office such as Influenza, Hepatitis B, Tetanus, or Pneumococcal?: No    Health Maintenance   Topic Date Due    Zoster Vaccines (1 of 2) Never done    Diabetes Care Dilated Eye Exam  02/14/2024    Pneumococcal Vaccine: 50+ Years (3 of 3 - PCV20 or PCV21) 04/10/2024    COVID-19 Vaccine (4 - 2024-25 season) 09/01/2024    Annual Well Visit  Never done    Diabetes Care: Foot Exam (Annual)   01/01/2025    Diabetes Care: Microalb/Creat Ratio (Annual)  01/01/2025    Mammogram  03/26/2025    Diabetes Care A1C  04/27/2025    HTN: BP Follow-Up  05/02/2025    Diabetes Care: GFR  07/10/2025    Influenza Vaccine (Season Ended) 10/01/2025    Colorectal Cancer Screening  08/10/2026    DEXA Scan  Completed    Annual Depression Screening  Completed    Fall Risk Screening (Annual)  Completed    Meningococcal B Vaccine  Aged Out

## 2025-04-14 ENCOUNTER — HOSPITAL ENCOUNTER (OUTPATIENT)
Dept: MAMMOGRAPHY | Age: 70
Discharge: HOME | End: 2025-04-14
Attending: INTERNAL MEDICINE
Payer: MEDICARE

## 2025-04-14 DIAGNOSIS — Z12.31 VISIT FOR SCREENING MAMMOGRAM: ICD-10-CM

## 2025-04-14 PROCEDURE — 77067 SCR MAMMO BI INCL CAD: CPT | Performed by: INTERNAL MEDICINE

## 2025-04-14 PROCEDURE — 77063 BREAST TOMOSYNTHESIS BI: CPT | Performed by: INTERNAL MEDICINE

## 2025-04-20 ENCOUNTER — PATIENT MESSAGE (OUTPATIENT)
Dept: INTERNAL MEDICINE CLINIC | Facility: CLINIC | Age: 70
End: 2025-04-20

## 2025-04-21 NOTE — TELEPHONE ENCOUNTER
Spoke with patient, Date of Birth verified  She was informed of mammogram result & MD recommendation, she stated understanding.  See above PCP message.

## 2025-04-28 ENCOUNTER — OFFICE VISIT (OUTPATIENT)
Dept: INTERNAL MEDICINE CLINIC | Facility: CLINIC | Age: 70
End: 2025-04-28

## 2025-04-28 VITALS
TEMPERATURE: 98 F | WEIGHT: 154 LBS | DIASTOLIC BLOOD PRESSURE: 76 MMHG | SYSTOLIC BLOOD PRESSURE: 136 MMHG | HEART RATE: 76 BPM | OXYGEN SATURATION: 98 % | BODY MASS INDEX: 31.46 KG/M2 | HEIGHT: 58.5 IN

## 2025-04-28 DIAGNOSIS — I15.2 HYPERTENSION ASSOCIATED WITH DIABETES (HCC): ICD-10-CM

## 2025-04-28 DIAGNOSIS — E11.65 TYPE 2 DIABETES MELLITUS WITH HYPERGLYCEMIA, WITHOUT LONG-TERM CURRENT USE OF INSULIN (HCC): ICD-10-CM

## 2025-04-28 DIAGNOSIS — E11.59 HYPERTENSION ASSOCIATED WITH DIABETES (HCC): ICD-10-CM

## 2025-04-28 DIAGNOSIS — J21.9 ACUTE BRONCHIOLITIS DUE TO UNSPECIFIED ORGANISM: Primary | ICD-10-CM

## 2025-04-28 PROCEDURE — 1159F MED LIST DOCD IN RCRD: CPT | Performed by: STUDENT IN AN ORGANIZED HEALTH CARE EDUCATION/TRAINING PROGRAM

## 2025-04-28 PROCEDURE — 1160F RVW MEDS BY RX/DR IN RCRD: CPT | Performed by: STUDENT IN AN ORGANIZED HEALTH CARE EDUCATION/TRAINING PROGRAM

## 2025-04-28 PROCEDURE — 99214 OFFICE O/P EST MOD 30 MIN: CPT | Performed by: STUDENT IN AN ORGANIZED HEALTH CARE EDUCATION/TRAINING PROGRAM

## 2025-04-28 RX ORDER — CODEINE PHOSPHATE AND GUAIFENESIN 10; 100 MG/5ML; MG/5ML
10 SOLUTION ORAL EVERY 6 HOURS PRN
Qty: 118 ML | Refills: 0 | Status: SHIPPED | OUTPATIENT
Start: 2025-04-28 | End: 2025-05-05

## 2025-04-28 RX ORDER — DOXYCYCLINE 100 MG/1
100 CAPSULE ORAL 2 TIMES DAILY WITH MEALS
Qty: 20 CAPSULE | Refills: 0 | Status: SHIPPED | OUTPATIENT
Start: 2025-04-28 | End: 2025-05-08

## 2025-04-28 NOTE — PROGRESS NOTES
OFFICE NOTE       The following individual(s) verbally consented to be recorded using ambient AI listening technology and understand that they can each withdraw their consent to this listening technology at any point by asking the clinician to turn off or pause the recording:    Patient name: Rossy Rogers  Additional names:            Patient ID: Rossy Rogers is a 69 year old female.  Today's Date: 04/28/25  Chief Complaint: Sore Throat (Sore throat x1 week, starting to cough )      History of Present Illness  Rossy Rogers is a 69 year old female with diabetes mellitus, hypertension, and hyperlipidemia who presents with recurrent cough.    She has been experiencing a sore throat and cough for the past week. Similar symptoms occurred in January following recent travels. She still possesses medication from the previous visit but did not complete the course, having taken it only twice. She requests a refill of doxycycline and cough syrup, which she recalls helped previously. No significant lung involvement with the cough, describing it as having 'a little mucus, little bronchi.'    Her diabetes mellitus is managed by Maxine and Emi. She does not currently have an eye doctor but was informed about an ophthalmologist, Dr. Sunny Xie, for a diabetic retinal eye exam.    Her hypertension is managed with amlodipine 10 mg. She has not checked her blood pressure at home recently.    She plans to travel to [place] to visit her sister and expresses concern about managing her cough during the trip.       Vitals:    04/28/25 1018 04/28/25 1022 04/28/25 1030   BP: 148/77 146/78 136/76   Pulse: 76 76    Temp: 97.7 °F (36.5 °C)     TempSrc: Tympanic     SpO2: 98%     Weight: 154 lb (69.9 kg)     Height: 4' 10.5\" (1.486 m)       body mass index is 31.64 kg/m².  BP Readings from Last 3 Encounters:   04/28/25 136/76   04/02/25 136/70   01/27/25 136/70     The 10-year ASCVD risk score (Lucian WOODRUFF, et al.,  2019) is: 22.7%    Values used to calculate the score:      Age: 69 years      Sex: Female      Is Non- : No      Diabetic: Yes      Tobacco smoker: No      Systolic Blood Pressure: 136 mmHg      Is BP treated: Yes      HDL Cholesterol: 78 mg/dL      Total Cholesterol: 161 mg/dL  Results           Medications reviewed:  Current Medications[1]      Assessment & Plan    1. Acute bronchiolitis due to unspecified organism (Primary)  -     Doxycycline Hyclate; Take 1 capsule (100 mg total) by mouth 2 (two) times daily with meals for 10 days.  Dispense: 20 capsule; Refill: 0  -     guaiFENesin-Codeine; Take 10 mL by mouth every 6 (six) hours as needed for cough (ideally take at night only to avoid sedation in day time).  Dispense: 118 mL; Refill: 0  2. Type 2 diabetes mellitus with hyperglycemia, without long-term current use of insulin (HCC)  -     Ophthalmology Referral - In Network  3. Hypertension associated with diabetes (HCC)    Assessment & Plan  Recurrent cough  Recurrent cough with mucus, similar to previous episodes. Effective treatment with doxycycline and guaifenesin-codeine syrup previously.  - Prescribe doxycycline.  - Prescribe guaifenesin-codeine syrup as needed.    Hypertension  Blood pressure 136/76 mmHg on amlodipine 10 mg daily. Discussed adding ACE inhibitor or ARB for renal protection due to diabetes if not controlled  - Continue amlodipine 10 mg daily and check BP daily, goal Below 130/80  - Consider discussing with PCP the addition of losartan or lisinopril for renal protection.    Diabetes mellitus  Managed by endocrinology. Referral to ophthalmology needed for diabetic retinal exam.  - Refer to ophthalmologist Dr. Sunny Xie for diabetic retinal eye exam.       Follow Up: As needed/if symptoms worsen or No follow-ups on file..     Objective/ Results:   Physical Exam  Constitutional:       Appearance: She is well-developed.   Cardiovascular:      Rate and Rhythm: Normal  rate and regular rhythm.      Heart sounds: Normal heart sounds.   Pulmonary:      Effort: Pulmonary effort is normal.      Breath sounds: Rhonchi present.   Abdominal:      General: Bowel sounds are normal.      Palpations: Abdomen is soft.   Skin:     General: Skin is warm and dry.   Neurological:      Mental Status: She is alert and oriented to person, place, and time.      Deep Tendon Reflexes: Reflexes are normal and symmetric.        Physical Exam  VITALS: BP- 136/76     Reviewed:    Patient Active Problem List    Diagnosis    Type 2 diabetes mellitus, without long-term current use of insulin (HCC)    Hypertension associated with diabetes (HCC)    Hyperlipidemia      Allergies[2]   Short Social Hx on File[3]   Review of Systems   Constitutional:  Positive for fatigue and fever.   HENT:  Positive for congestion.    Respiratory:  Positive for cough, chest tightness and shortness of breath.    Cardiovascular: Negative.    Gastrointestinal: Negative.    Skin: Negative.    Neurological: Negative.        All other systems negative unless otherwise stated in ROS or HPI above.       Rinku Marie MD  Internal Medicine       Call office with any questions or seek emergency care if necessary.   Patient understands and agrees to follow directions and advice.      ----------------------------------------- PATIENT INSTRUCTIONS-----------------------------------------     There are no Patient Instructions on file for this visit.        The 21st Century Cures Act makes medical notes available to patients in the interest of transparency.  However, please be advised that this is a medical document.  It is intended as a peer to peer communication.  It is written in medical language and may contain abbreviations or verbiage that are technical and unfamiliar.  It may appear blunt or direct.  Medical documents are intended to carry relevant information, facts as evident, and the clinical opinion of the practitioner.          [1]    Current Outpatient Medications   Medication Sig Dispense Refill    doxycycline 100 MG Oral Cap Take 1 capsule (100 mg total) by mouth 2 (two) times daily with meals for 10 days. 20 capsule 0    guaiFENesin-codeine 100-10 MG/5ML Oral Solution Take 10 mL by mouth every 6 (six) hours as needed for cough (ideally take at night only to avoid sedation in day time). 118 mL 0    METFORMIN 500 MG Oral Tab TAKE 2 TABLETS BY MOUTH TWICE DAILY WITH MEALS 360 tablet 0    PIOGLITAZONE 30 MG Oral Tab Take 1 tablet by mouth once daily 90 tablet 3    amLODIPine 10 MG Oral Tab Take 1 tablet (10 mg total) by mouth daily. 90 tablet 3    rosuvastatin 10 MG Oral Tab Take 1 tablet (10 mg total) by mouth every evening. 90 tablet 3    clotrimazole-betamethasone 1-0.05 % External Cream Use bid to affected areas of skin 45 g 2    loratadine 10 MG Oral Tab Take 1 tablet (10 mg total) by mouth daily. 30 tablet 3    Glucose Blood (ACCU-CHEK BEATRICE PLUS) In Vitro Strip Check blood sugar twice daily 200 strip 0    Lancets Thin Does not apply Misc Check blood sugar twice daily 200 each 2    Blood Glucose Monitoring Suppl (ACCU-CHEK BEATRICE PLUS) w/Device Does not apply Kit Check blood sugar twice daily 1 kit 0    CALCIUM OR Take by mouth.      aspirin 81 MG Oral Tab Take 1 tablet (81 mg total) by mouth daily.      Multiple Vitamins-Minerals (WOMENS MULTIVITAMIN PLUS) Oral Tab Take  by mouth.     [2]   Allergies  Allergen Reactions    Pcn [Penicillins] RASH   [3]   Social History  Socioeconomic History    Marital status:    Tobacco Use    Smoking status: Never     Passive exposure: Never    Smokeless tobacco: Never   Vaping Use    Vaping status: Never Used   Substance and Sexual Activity    Alcohol use: Yes     Comment: 1-2 glasses yearly    Drug use: No   Other Topics Concern    Caffeine Concern Yes     Comment: 2 cups tea/coffee daily    Pt has a pacemaker No    Pt has a defibrillator No    Reaction to local anesthetic No     Social Drivers  of Health     Food Insecurity: No Food Insecurity (4/2/2025)    NCSS - Food Insecurity     Worried About Running Out of Food in the Last Year: No     Ran Out of Food in the Last Year: No   Transportation Needs: No Transportation Needs (4/2/2025)    NCSS - Transportation     Lack of Transportation: No   Housing Stability: Not At Risk (4/2/2025)    NCSS - Housing/Utilities     Has Housing: Yes     Worried About Losing Housing: No     Unable to Get Utilities: No

## 2025-05-07 RX ORDER — LORATADINE 10 MG/1
10 TABLET ORAL DAILY
Qty: 30 TABLET | Refills: 0 | OUTPATIENT
Start: 2025-05-07

## 2025-05-07 NOTE — TELEPHONE ENCOUNTER
Medication Refill request for Equate allergy meds, needs an appt for refills.  Last office visit:  3/7/24

## 2025-05-09 ENCOUNTER — TELEPHONE (OUTPATIENT)
Dept: ENDOCRINOLOGY CLINIC | Facility: CLINIC | Age: 70
End: 2025-05-09

## 2025-05-09 NOTE — TELEPHONE ENCOUNTER
Received eye exam dated on 5/8/25 attached is eye exam, exam placed in provider folder for review.

## 2025-05-28 ENCOUNTER — OFFICE VISIT (OUTPATIENT)
Dept: ENDOCRINOLOGY CLINIC | Facility: CLINIC | Age: 70
End: 2025-05-28

## 2025-05-28 VITALS
DIASTOLIC BLOOD PRESSURE: 66 MMHG | HEIGHT: 58.5 IN | SYSTOLIC BLOOD PRESSURE: 132 MMHG | RESPIRATION RATE: 18 BRPM | BODY MASS INDEX: 32.16 KG/M2 | HEART RATE: 76 BPM | WEIGHT: 157.38 LBS

## 2025-05-28 DIAGNOSIS — E11.3293 TYPE 2 DIABETES MELLITUS WITH BOTH EYES AFFECTED BY MILD NONPROLIFERATIVE RETINOPATHY WITHOUT MACULAR EDEMA, WITHOUT LONG-TERM CURRENT USE OF INSULIN (HCC): Primary | ICD-10-CM

## 2025-05-28 LAB
GLUCOSE BLOOD: 197
HEMOGLOBIN A1C: 9.1 % (ref 4.3–5.6)
TEST STRIP LOT #: NORMAL NUMERIC

## 2025-05-28 PROCEDURE — 1159F MED LIST DOCD IN RCRD: CPT | Performed by: NURSE PRACTITIONER

## 2025-05-28 PROCEDURE — 1160F RVW MEDS BY RX/DR IN RCRD: CPT | Performed by: NURSE PRACTITIONER

## 2025-05-28 PROCEDURE — 83036 HEMOGLOBIN GLYCOSYLATED A1C: CPT | Performed by: NURSE PRACTITIONER

## 2025-05-28 PROCEDURE — 99214 OFFICE O/P EST MOD 30 MIN: CPT | Performed by: NURSE PRACTITIONER

## 2025-05-28 PROCEDURE — 82947 ASSAY GLUCOSE BLOOD QUANT: CPT | Performed by: NURSE PRACTITIONER

## 2025-05-28 RX ORDER — REPAGLINIDE 0.5 MG/1
0.5 TABLET ORAL
Qty: 270 TABLET | Refills: 0 | Status: SHIPPED | OUTPATIENT
Start: 2025-05-28 | End: 2025-08-26

## 2025-05-28 RX ORDER — ORAL SEMAGLUTIDE 3 MG/1
3 TABLET ORAL DAILY
Qty: 30 TABLET | Refills: 0 | Status: SHIPPED | OUTPATIENT
Start: 2025-05-28 | End: 2025-05-28

## 2025-05-28 NOTE — PROGRESS NOTES
Name: Rossy Rogers  Date: 2025    CHIEF COMPLAINT   Chief Complaint   Patient presents with    Diabetes     Follow-Up       HISTORY OF PRESENT ILLNESS   Rossy Rogers is a 69 year old female who presents for follow up on diabetes management.   Hgb A1C: 9.1% at POC today. Previously was 8.5% on 2025.   Blood glucose is: 197 in clinic today. Had coffee prior to apt today   Patient notes that she has been feeling well and denies any health changes or taking new medications since last office visit. She continues to follow a low carb diet and staying active per usual.   Since last office visit she has stopped taking Jardiance due to high cost.  She has not also been able to take a GLP-1 medication due to high cost.    FAMILY HISTORY OF DIABETES  -all sisters- T2DM  DIABETES HISTORY  Diagnosed: around age 41  Prior HbA, C or glycohemoglobin were 9.3% on 2021; 8.9% on 2021; 8.9% 2021; 7.8% 2022; 9.7% 2022; 9.3% 2022; 8.9% 2022; 8.3% 2023; 8.4% 2023; 7.3%  2023; 7.4% 2023; 9.2% 3/2024; 8.7% 7/10/2024; 9.0% 2024; 6.9% 10/23/2024; 6.9% 2025; 9.1% at POC today;      Patient has not had hospitalizations for blood sugar issues.   Denies any history of pancreatitis.     PREVIOUS MEDICATION FOR DM:  - Trulicity- d/c'ed due to patient not wanting to use injectable medication;    - Jardiance- unable to use due to high cost     - glimepiride -d/c'ed due to hypoglycemia   - Pioglitazone- d/c'ed due to leg edema   - Mounjaro-- has not taken due to high cost   - Rybelsus - was also high cost     CURRENT MEDICATIONS FOR DM:  - Metformin 1,000mg with lunch            1,000mg with dinner     - Jardiance 25mg daily in AM - stopped taking due to high cost     HOME GLUCOSE READINGS:   Testinx daily   Fastins- 130; 105  Hypoglycemia: no     HISTORY OF DIABETES COMPLICATIONS:  History of Retinopathy: yes mild- bilateral - last eye exam within the last 12  months: no - last exam was on 2/14/2023- followed by Dr. Linette Arcos  History of Neuropathy: no  History of Nephropathy: no    ASSOCIATED COMPLICATIONS:   HTN: yes   Hyperlipidemia: yes   Coronary Artery Disease: no  Cerebrovascular Disease: no  Peripheral Vascular Disease: no      DIETARY COMPLIANCE:  Eating 3 meals per day   - has been drinking coke - 2 cans per day   - banana; pauline; apples; oranges     EXERCISE:   Yes - kick boxing 3x weekly and walking with silver sneakers 2x weekly     Polyuria, polyphagia, polydipsia: no   Paresthesias: no   Blurred vision: no   Recent steroids, illness or infections: no     REVIEW OF SYSTEMS  Constitutional: Negative for: weight change, fever, fatigue, cold/heat intolerance  Eyes: Negative for:  Visual changes, proptosis, blurring  ENT: Negative for:  dysphagia, neck swelling, dysphonia  Respiratory: Negative for: hemoptysis, shortness of breath, cough, or dyspnea.  Cardiovascular: Negative for:  chest pain, chest discomfort, palpitations  GI: Negative for:  abdominal pain, nausea, vomiting, diarrhea, heartburn, constipation  Neurology: Negative for: headache, dizziness, syncope, numbness/tingling, or weakness.   Genito-Urinary: Negative for: dysuria, frequency or hematuria   Hematology/Lymphatics: Negative for: bruising, easy bleeding, lower extremity edema  Endocrine: Negative for: polyuria, polydipsia. No thyroid disease. No osteoporosis.   Skin: Negative for: rash, blister, infection or ulcers.    MEDICATIONS:     Current Outpatient Medications:     METFORMIN 500 MG Oral Tab, TAKE 2 TABLETS BY MOUTH TWICE DAILY WITH MEALS, Disp: 360 tablet, Rfl: 0    PIOGLITAZONE 30 MG Oral Tab, Take 1 tablet by mouth once daily, Disp: 90 tablet, Rfl: 3    amLODIPine 10 MG Oral Tab, Take 1 tablet (10 mg total) by mouth daily., Disp: 90 tablet, Rfl: 3    rosuvastatin 10 MG Oral Tab, Take 1 tablet (10 mg total) by mouth every evening., Disp: 90 tablet, Rfl: 3     clotrimazole-betamethasone 1-0.05 % External Cream, Use bid to affected areas of skin, Disp: 45 g, Rfl: 2    loratadine 10 MG Oral Tab, Take 1 tablet (10 mg total) by mouth daily., Disp: 30 tablet, Rfl: 3    Glucose Blood (ACCU-CHEK BEATRICE PLUS) In Vitro Strip, Check blood sugar twice daily, Disp: 200 strip, Rfl: 0    Lancets Thin Does not apply Misc, Check blood sugar twice daily, Disp: 200 each, Rfl: 2    Blood Glucose Monitoring Suppl (ACCU-CHEK BEATRICE PLUS) w/Device Does not apply Kit, Check blood sugar twice daily, Disp: 1 kit, Rfl: 0    CALCIUM OR, Take by mouth., Disp: , Rfl:     aspirin 81 MG Oral Tab, Take 1 tablet (81 mg total) by mouth daily., Disp: , Rfl:     Multiple Vitamins-Minerals (WOMENS MULTIVITAMIN PLUS) Oral Tab, Take  by mouth., Disp: , Rfl:     ALLERGIES:   Allergies   Allergen Reactions    Pcn [Penicillins] RASH       SOCIAL HISTORY:   Social History     Socioeconomic History    Marital status:    Tobacco Use    Smoking status: Never     Passive exposure: Never    Smokeless tobacco: Never   Vaping Use    Vaping status: Never Used   Substance and Sexual Activity    Alcohol use: Yes     Comment: 1-2 glasses yearly    Drug use: No   Other Topics Concern    Caffeine Concern Yes     Comment: 2 cups tea/coffee daily    Pt has a pacemaker No    Pt has a defibrillator No    Reaction to local anesthetic No       PAST MEDICAL HISTORY:   Past Medical History:    Diabetes (HCC)    Elevated lipids    Hyperlipidemia    High blood pressure    High cholesterol       PAST SURGICAL HISTORY:   Past Surgical History:   Procedure Laterality Date    Colonoscopy N/A 08/10/2021    Procedure: COLONOSCOPY;  Surgeon: Smith Winn MD;  Location: [facility] ENDOSCOPY    Colonoscopy & polypectomy      Electrocardiogram, complete  06/05/2014    Scanned to Media Tab    Hysterectomy      age 41     PHYSICAL EXAM:   Vitals:    05/28/25 0837 05/28/25 0940   BP: 151/75 132/66   BP Location: Right arm Right arm    Pulse: 76    Resp: 18    Weight: 157 lb 6.4 oz (71.4 kg)    Height: 4' 10.5\" (1.486 m)      BMI:   Body mass index is 32.34 kg/m².    General Appearance:  alert, well developed, in no acute distress  Nutritional:  no extreme weight gain or loss  Head: Atraumatic  Eyes:  normal conjunctivae, sclera., normal sclera and normal pupils  Throat/Neck: normal sound to voice. Normal hearing, normal speech  Back: no kyphosis  Respiratory:  Speaking in full sentences, non-labored. no increased work of breathing, no audible wheezing    Skin:  normal moisture and skin texture;   Hair and nails: normal scalp hair  Hematologic:  no excessive bruising  Neuro: motor grossly intact, moving all extremities without difficulty  Psychiatric:  oriented to time, self, and place  Extremities: no obvious extremity swelling, no lesions    LABS: Pertinent labs reviewed    ASSESSMENT/PLAN:    -Reviewed with patient the pathogenesis of diabetes, clinical significance of A1c, and common complications such as: microvascular, macrovascular and diabetic ketoacidosis. Patient verbalizes understanding of the importance of glycemic control and the goals of therapy.     1.Type 2 Diabetes Mellitus, uncontrolled with hypoglycemia   -LAB DATA  HbA,C: 9.1% today   a) Medications                    -continue with Metformin 1,000mg with lunch          1,000mg with dinner  - Start repaglinide 0.5 mg (1 tablet) before each meal - Risks and benefits reviewed. Verbalizes understanding.    -Discussed getting back on track with avoiding soda  -Discussed getting back on track with limiting rice to only half a cup once daily   - discussed to cont. with following a low carb diet   - discussed to cont. Exercising daily   -reviewed target goal BG readings and A1C  -reviewed when to call clinic with abnormal BG readings.   - Discussed that she gives me an update on her glucose readings in 2 weeks.  Will plan to modify repaglinide dose further as needed at that time.      b) No nephropathy: GFR: 62 on 7/10/2024 and urine MA: 43.9 on 7/10/2024  c) UTD   D) foot exam: normal on 10/23/2024  e) start testing BG 1x daily- alternate with fasting or 2hrs after dinner   f) Life style changes reviewed     2. Hypertension   - on Amlodipine 10mg daily and lisinopril 5mg daily   - Repeat BP at goal    2.hyperlipidemia   -LDL: 67 and Tri on 7/10/2024  -On rosuvastatin 10mg at bedtime       RTC in 3 months   Patient instructed to call sooner if they develop Blood glucose readings <75 and/or if they have readings persistently >200.     The risks and benefits of my recommendations were discussed with the patient today. questions were also answered to the best of my knowledge. Patient verbalizes understanding of these issues and agrees to the plan.    2025  GISELA Francois

## 2025-05-28 NOTE — PATIENT INSTRUCTIONS
A1C: 9.1% today --> previously was 8.5% on 1/27/2025  Blood glucose: 197 in clinic today    Medications:   - continue with Metformin   1,000mg with lunch    1,000mg with dinner  -  start Repaglinide 0.5mg (1 tablet) before meals       - let's get back on track with avoiding soda   - follow a low carb diet - limiting rice to 1/2 cup       - Please give me an update on your glucose readings in 2 weeks     Weight:  Wt Readings from Last 6 Encounters:   05/28/25 157 lb 6.4 oz (71.4 kg)   04/28/25 154 lb (69.9 kg)   04/02/25 157 lb (71.2 kg)   01/27/25 160 lb (72.6 kg)   01/10/25 161 lb (73 kg)   10/23/24 156 lb 9.6 oz (71 kg)     A1C goal:  <7.5%    Blood sugar testing:  Test your blood sugar 1 time daily   Recommended times to test: alternate with before breakfast (fasting) or 2hrs after meals     Blood sugar targets:  Before breakfast:  (preferably < 110)  Before meals: <150  2 hours after meals: <180 (preferably <150)     Call for persistent blood sugars < 75 or > 200

## 2025-06-12 DIAGNOSIS — E11.69 TYPE 2 DIABETES MELLITUS WITH OTHER SPECIFIED COMPLICATION, WITHOUT LONG-TERM CURRENT USE OF INSULIN (HCC): ICD-10-CM

## 2025-08-05 ENCOUNTER — TELEPHONE (OUTPATIENT)
Dept: INTERNAL MEDICINE CLINIC | Facility: CLINIC | Age: 70
End: 2025-08-05

## (undated) DEVICE — LINE MNTR ADLT SET O2 INTMD

## (undated) DEVICE — Device: Brand: CUSTOM PROCEDURE KIT

## (undated) DEVICE — 35 ML SYRINGE REGULAR TIP: Brand: MONOJECT

## (undated) DEVICE — Device: Brand: DEFENDO AIR/WATER/SUCTION AND BIOPSY VALVE

## (undated) DEVICE — MEDI-VAC NON-CONDUCTIVE SUCTION TUBING 6MM X 1.8M (6FT.) L: Brand: CARDINAL HEALTH

## (undated) DEVICE — STERILE LATEX POWDER-FREE SURGICAL GLOVESWITH NITRILE COATING: Brand: PROTEXIS

## (undated) NOTE — LETTER
11/5/2020    Diane [address]            Dear Diane Koenig,      Our records indicate that you are due for an appointment for a Colonoscopy with Jasper Barker MD.    Please call our office to sche

## (undated) NOTE — MR AVS SNAPSHOT
[place] [facility]. [address] [place] [address]  [address] [place] [address]  [id number]  [phone]               Thank you for choosing us for your health care visit with Simi San MD.  We are glad to serve you and happy to provide you with this summary of Take 1 tablet (10 mg total) by mouth daily.    Commonly known as:  CLARITIN           MetFORMIN HCl 500 MG Tabs   TAKE 2 TABLETS BY MOUTH TWICE DAILY   Commonly known as:  GLUCOPHAGE           * Montelukast Sodium 10 MG Tabs   TAKE 1 TABLET BY MOUTH NIGHTLY

## (undated) NOTE — LETTER
04/16/20        Kael Chanel [id number]      Dear Casey Simmons,    [facility] records indicate that you have outstanding lab work and or testing that was ordered for you and has not yet been completed:  Orders Placed This Encounter

## (undated) NOTE — LETTER
11/18/21        Sudhir Robbins [id number]      Dear Ilya Caal,    [facility] records indicate that you have outstanding lab work and or testing that was ordered for you and has not yet been completed:  Orders Placed This Encounter

## (undated) NOTE — LETTER
12/27/19        [address] [place] [address]      Dear Augustus Patricia records indicate that you have outstanding lab work and or testing that was ordered for you and has not yet been completed:  Orders Placed This Encounter

## (undated) NOTE — MR AVS SNAPSHOT
[facility] SPECIALTY [facility] [place] [address] [address] [id number] [phone]               Thank you for choosing us for your health care visit with Bernabe Carver MD.  We are glad to serve you and happy to provide you with this summary of loratadine 10 MG Tabs   Take 1 tablet (10 mg total) by mouth daily. Commonly known as:  CLARITIN           MetFORMIN HCl 500 MG Tabs   TAKE 2 TABLETS BY MOUTH TWICE DAILY   What changed:  See the new instructions.    Commonly known as:  GLUCOPHAGE current use of insulin ([facility] [E11.69]           Hemoglobin A1C    Complete by:   Feb 07, 2017 (Approximate)    Assoc Dx:  Diabetes mellitus with other complication, without long-term current use of insulin ([facility] [E11.69]           Lipid Panel [E]    Complet schedule your appointment. Failure to obtain required authorization numbers can create reimbursement difficulties for you.     Assoc Dx:  Diabetes mellitus with other complication, without long-term current use of insulin ([facility] [E11.69]          Josyhart

## (undated) NOTE — LETTER
08/17/18        [address] [place] [address]      Dear Day Bustos,    [facility] records indicate that you have outstanding lab work and or testing that was ordered for you and has not yet been completed:          Free T4, (Free Thyroxine

## (undated) NOTE — LETTER
10/28/20      [address] [place] [id number]      Dear Stephanie Aponte,    [facility] records indicate that you have outstanding lab work and or testing that was ordered for you and has not yet been completed:  Orders Placed This Encounter